# Patient Record
Sex: FEMALE | Race: OTHER | NOT HISPANIC OR LATINO | URBAN - METROPOLITAN AREA
[De-identification: names, ages, dates, MRNs, and addresses within clinical notes are randomized per-mention and may not be internally consistent; named-entity substitution may affect disease eponyms.]

---

## 2022-07-02 PROBLEM — Z00.00 ENCOUNTER FOR PREVENTIVE HEALTH EXAMINATION: Status: ACTIVE | Noted: 2022-07-02

## 2022-07-03 ENCOUNTER — OUTPATIENT (OUTPATIENT)
Dept: OUTPATIENT SERVICES | Facility: HOSPITAL | Age: 82
LOS: 1 days | End: 2022-07-03
Payer: MEDICARE

## 2022-07-03 ENCOUNTER — APPOINTMENT (OUTPATIENT)
Dept: CT IMAGING | Facility: IMAGING CENTER | Age: 82
End: 2022-07-03

## 2022-07-03 DIAGNOSIS — Z00.8 ENCOUNTER FOR OTHER GENERAL EXAMINATION: ICD-10-CM

## 2022-07-03 PROCEDURE — 71275 CT ANGIOGRAPHY CHEST: CPT | Mod: MH

## 2022-07-03 PROCEDURE — 71275 CT ANGIOGRAPHY CHEST: CPT | Mod: 26,MH

## 2024-01-01 ENCOUNTER — TRANSCRIPTION ENCOUNTER (OUTPATIENT)
Age: 84
End: 2024-01-01

## 2024-01-01 ENCOUNTER — RESULT REVIEW (OUTPATIENT)
Age: 84
End: 2024-01-01

## 2024-01-01 ENCOUNTER — INPATIENT (INPATIENT)
Facility: HOSPITAL | Age: 84
LOS: 6 days | DRG: 602 | End: 2024-03-01
Attending: INTERNAL MEDICINE | Admitting: STUDENT IN AN ORGANIZED HEALTH CARE EDUCATION/TRAINING PROGRAM
Payer: MEDICARE

## 2024-01-01 VITALS
RESPIRATION RATE: 22 BRPM | HEART RATE: 80 BPM | OXYGEN SATURATION: 92 % | TEMPERATURE: 97 F | DIASTOLIC BLOOD PRESSURE: 71 MMHG | SYSTOLIC BLOOD PRESSURE: 123 MMHG

## 2024-01-01 VITALS — TEMPERATURE: 98 F

## 2024-01-01 DIAGNOSIS — I50.9 HEART FAILURE, UNSPECIFIED: ICD-10-CM

## 2024-01-01 DIAGNOSIS — I48.0 PAROXYSMAL ATRIAL FIBRILLATION: ICD-10-CM

## 2024-01-01 DIAGNOSIS — Z95.1 PRESENCE OF AORTOCORONARY BYPASS GRAFT: Chronic | ICD-10-CM

## 2024-01-01 DIAGNOSIS — D72.829 ELEVATED WHITE BLOOD CELL COUNT, UNSPECIFIED: ICD-10-CM

## 2024-01-01 DIAGNOSIS — I27.20 PULMONARY HYPERTENSION, UNSPECIFIED: ICD-10-CM

## 2024-01-01 DIAGNOSIS — R57.9 SHOCK, UNSPECIFIED: ICD-10-CM

## 2024-01-01 DIAGNOSIS — N17.9 ACUTE KIDNEY FAILURE, UNSPECIFIED: ICD-10-CM

## 2024-01-01 LAB
A1C WITH ESTIMATED AVERAGE GLUCOSE RESULT: 6 % — HIGH (ref 4–5.6)
ALBUMIN SERPL ELPH-MCNC: 3.1 G/DL — LOW (ref 3.3–5.2)
ALBUMIN SERPL ELPH-MCNC: 3.1 G/DL — LOW (ref 3.3–5.2)
ALBUMIN SERPL ELPH-MCNC: 3.3 G/DL — SIGNIFICANT CHANGE UP (ref 3.3–5.2)
ALBUMIN SERPL ELPH-MCNC: 3.3 G/DL — SIGNIFICANT CHANGE UP (ref 3.3–5.2)
ALBUMIN SERPL ELPH-MCNC: 3.4 G/DL — SIGNIFICANT CHANGE UP (ref 3.3–5.2)
ALBUMIN SERPL ELPH-MCNC: 3.4 G/DL — SIGNIFICANT CHANGE UP (ref 3.3–5.2)
ALBUMIN SERPL ELPH-MCNC: 4 G/DL — SIGNIFICANT CHANGE UP (ref 3.3–5.2)
ALP SERPL-CCNC: 103 U/L — SIGNIFICANT CHANGE UP (ref 40–120)
ALP SERPL-CCNC: 107 U/L — SIGNIFICANT CHANGE UP (ref 40–120)
ALP SERPL-CCNC: 77 U/L — SIGNIFICANT CHANGE UP (ref 40–120)
ALP SERPL-CCNC: 83 U/L — SIGNIFICANT CHANGE UP (ref 40–120)
ALP SERPL-CCNC: 84 U/L — SIGNIFICANT CHANGE UP (ref 40–120)
ALP SERPL-CCNC: 88 U/L — SIGNIFICANT CHANGE UP (ref 40–120)
ALP SERPL-CCNC: 94 U/L — SIGNIFICANT CHANGE UP (ref 40–120)
ALT FLD-CCNC: 13 U/L — SIGNIFICANT CHANGE UP
ALT FLD-CCNC: 360 U/L — HIGH
ALT FLD-CCNC: 490 U/L — HIGH
ALT FLD-CCNC: 564 U/L — HIGH
ALT FLD-CCNC: 635 U/L — HIGH
ALT FLD-CCNC: 7 U/L — SIGNIFICANT CHANGE UP
ALT FLD-CCNC: 72 U/L — HIGH
ANION GAP SERPL CALC-SCNC: 11 MMOL/L — SIGNIFICANT CHANGE UP (ref 5–17)
ANION GAP SERPL CALC-SCNC: 13 MMOL/L — SIGNIFICANT CHANGE UP (ref 5–17)
ANION GAP SERPL CALC-SCNC: 13 MMOL/L — SIGNIFICANT CHANGE UP (ref 5–17)
ANION GAP SERPL CALC-SCNC: 14 MMOL/L — SIGNIFICANT CHANGE UP (ref 5–17)
ANION GAP SERPL CALC-SCNC: 14 MMOL/L — SIGNIFICANT CHANGE UP (ref 5–17)
ANION GAP SERPL CALC-SCNC: 16 MMOL/L — SIGNIFICANT CHANGE UP (ref 5–17)
ANION GAP SERPL CALC-SCNC: 18 MMOL/L — HIGH (ref 5–17)
ANION GAP SERPL CALC-SCNC: 20 MMOL/L — HIGH (ref 5–17)
ANION GAP SERPL CALC-SCNC: 20 MMOL/L — HIGH (ref 5–17)
ANION GAP SERPL CALC-SCNC: 22 MMOL/L — HIGH (ref 5–17)
ANION GAP SERPL CALC-SCNC: 22 MMOL/L — HIGH (ref 5–17)
ANION GAP SERPL CALC-SCNC: 23 MMOL/L — HIGH (ref 5–17)
ANION GAP SERPL CALC-SCNC: 24 MMOL/L — HIGH (ref 5–17)
ANISOCYTOSIS BLD QL: SLIGHT — SIGNIFICANT CHANGE UP
APPEARANCE UR: ABNORMAL
APPEARANCE UR: CLEAR — SIGNIFICANT CHANGE UP
APTT BLD: 117.4 SEC — HIGH (ref 24.5–35.6)
APTT BLD: 134.5 SEC — CRITICAL HIGH (ref 24.5–35.6)
APTT BLD: 190.5 SEC — CRITICAL HIGH (ref 24.5–35.6)
APTT BLD: 30.2 SEC — SIGNIFICANT CHANGE UP (ref 24.5–35.6)
APTT BLD: 31.6 SEC — SIGNIFICANT CHANGE UP (ref 24.5–35.6)
APTT BLD: 39.2 SEC — HIGH (ref 24.5–35.6)
APTT BLD: 78.5 SEC — HIGH (ref 24.5–35.6)
APTT BLD: 93 SEC — HIGH (ref 24.5–35.6)
AST SERPL-CCNC: 1046 U/L — HIGH
AST SERPL-CCNC: 12 U/L — SIGNIFICANT CHANGE UP
AST SERPL-CCNC: 140 U/L — HIGH
AST SERPL-CCNC: 1522 U/L — HIGH
AST SERPL-CCNC: 17 U/L — SIGNIFICANT CHANGE UP
AST SERPL-CCNC: 590 U/L — HIGH
AST SERPL-CCNC: 958 U/L — HIGH
BACTERIA # UR AUTO: ABNORMAL /HPF
BASE EXCESS BLDV CALC-SCNC: -1.5 MMOL/L — SIGNIFICANT CHANGE UP (ref -2–3)
BASOPHILS # BLD AUTO: 0 K/UL — SIGNIFICANT CHANGE UP (ref 0–0.2)
BASOPHILS # BLD AUTO: 0.03 K/UL — SIGNIFICANT CHANGE UP (ref 0–0.2)
BASOPHILS # BLD AUTO: 0.04 K/UL — SIGNIFICANT CHANGE UP (ref 0–0.2)
BASOPHILS # BLD AUTO: 0.05 K/UL — SIGNIFICANT CHANGE UP (ref 0–0.2)
BASOPHILS # BLD AUTO: 0.12 K/UL — SIGNIFICANT CHANGE UP (ref 0–0.2)
BASOPHILS NFR BLD AUTO: 0 % — SIGNIFICANT CHANGE UP (ref 0–2)
BASOPHILS NFR BLD AUTO: 0.1 % — SIGNIFICANT CHANGE UP (ref 0–2)
BASOPHILS NFR BLD AUTO: 0.2 % — SIGNIFICANT CHANGE UP (ref 0–2)
BASOPHILS NFR BLD AUTO: 0.4 % — SIGNIFICANT CHANGE UP (ref 0–2)
BASOPHILS NFR BLD AUTO: 0.4 % — SIGNIFICANT CHANGE UP (ref 0–2)
BASOPHILS NFR BLD AUTO: 0.5 % — SIGNIFICANT CHANGE UP (ref 0–2)
BASOPHILS NFR BLD AUTO: 0.6 % — SIGNIFICANT CHANGE UP (ref 0–2)
BILIRUB SERPL-MCNC: 0.4 MG/DL — SIGNIFICANT CHANGE UP (ref 0.4–2)
BILIRUB SERPL-MCNC: 0.4 MG/DL — SIGNIFICANT CHANGE UP (ref 0.4–2)
BILIRUB SERPL-MCNC: 0.5 MG/DL — SIGNIFICANT CHANGE UP (ref 0.4–2)
BILIRUB SERPL-MCNC: 0.9 MG/DL — SIGNIFICANT CHANGE UP (ref 0.4–2)
BILIRUB SERPL-MCNC: 1.1 MG/DL — SIGNIFICANT CHANGE UP (ref 0.4–2)
BILIRUB UR-MCNC: ABNORMAL
BILIRUB UR-MCNC: NEGATIVE — SIGNIFICANT CHANGE UP
BUN SERPL-MCNC: 32.9 MG/DL — HIGH (ref 8–20)
BUN SERPL-MCNC: 34.7 MG/DL — HIGH (ref 8–20)
BUN SERPL-MCNC: 40.7 MG/DL — HIGH (ref 8–20)
BUN SERPL-MCNC: 40.9 MG/DL — HIGH (ref 8–20)
BUN SERPL-MCNC: 41.3 MG/DL — HIGH (ref 8–20)
BUN SERPL-MCNC: 44.2 MG/DL — HIGH (ref 8–20)
BUN SERPL-MCNC: 44.6 MG/DL — HIGH (ref 8–20)
BUN SERPL-MCNC: 52.8 MG/DL — HIGH (ref 8–20)
BUN SERPL-MCNC: 59.1 MG/DL — HIGH (ref 8–20)
BUN SERPL-MCNC: 65 MG/DL — HIGH (ref 8–20)
BUN SERPL-MCNC: 68.1 MG/DL — HIGH (ref 8–20)
BUN SERPL-MCNC: 73.4 MG/DL — HIGH (ref 8–20)
BUN SERPL-MCNC: 80.7 MG/DL — HIGH (ref 8–20)
BURR CELLS BLD QL SMEAR: PRESENT — SIGNIFICANT CHANGE UP
CA-I SERPL-SCNC: 1.18 MMOL/L — SIGNIFICANT CHANGE UP (ref 1.15–1.33)
CALCIUM SERPL-MCNC: 7.2 MG/DL — LOW (ref 8.4–10.5)
CALCIUM SERPL-MCNC: 7.3 MG/DL — LOW (ref 8.4–10.5)
CALCIUM SERPL-MCNC: 7.4 MG/DL — LOW (ref 8.4–10.5)
CALCIUM SERPL-MCNC: 7.4 MG/DL — LOW (ref 8.4–10.5)
CALCIUM SERPL-MCNC: 7.7 MG/DL — LOW (ref 8.4–10.5)
CALCIUM SERPL-MCNC: 7.7 MG/DL — LOW (ref 8.4–10.5)
CALCIUM SERPL-MCNC: 8 MG/DL — LOW (ref 8.4–10.5)
CALCIUM SERPL-MCNC: 8 MG/DL — LOW (ref 8.4–10.5)
CALCIUM SERPL-MCNC: 8.3 MG/DL — LOW (ref 8.4–10.5)
CALCIUM SERPL-MCNC: 8.5 MG/DL — SIGNIFICANT CHANGE UP (ref 8.4–10.5)
CALCIUM SERPL-MCNC: 8.8 MG/DL — SIGNIFICANT CHANGE UP (ref 8.4–10.5)
CAST: 0 /LPF — SIGNIFICANT CHANGE UP (ref 0–4)
CHLORIDE BLDV-SCNC: 106 MMOL/L — SIGNIFICANT CHANGE UP (ref 96–108)
CHLORIDE SERPL-SCNC: 102 MMOL/L — SIGNIFICANT CHANGE UP (ref 96–108)
CHLORIDE SERPL-SCNC: 102 MMOL/L — SIGNIFICANT CHANGE UP (ref 96–108)
CHLORIDE SERPL-SCNC: 104 MMOL/L — SIGNIFICANT CHANGE UP (ref 96–108)
CHLORIDE SERPL-SCNC: 104 MMOL/L — SIGNIFICANT CHANGE UP (ref 96–108)
CHLORIDE SERPL-SCNC: 105 MMOL/L — SIGNIFICANT CHANGE UP (ref 96–108)
CHLORIDE SERPL-SCNC: 106 MMOL/L — SIGNIFICANT CHANGE UP (ref 96–108)
CHLORIDE SERPL-SCNC: 94 MMOL/L — LOW (ref 96–108)
CHLORIDE SERPL-SCNC: 97 MMOL/L — SIGNIFICANT CHANGE UP (ref 96–108)
CHLORIDE SERPL-SCNC: 97 MMOL/L — SIGNIFICANT CHANGE UP (ref 96–108)
CHLORIDE SERPL-SCNC: 98 MMOL/L — SIGNIFICANT CHANGE UP (ref 96–108)
CHLORIDE SERPL-SCNC: 99 MMOL/L — SIGNIFICANT CHANGE UP (ref 96–108)
CO2 SERPL-SCNC: 19 MMOL/L — LOW (ref 22–29)
CO2 SERPL-SCNC: 21 MMOL/L — LOW (ref 22–29)
CO2 SERPL-SCNC: 21 MMOL/L — LOW (ref 22–29)
CO2 SERPL-SCNC: 22 MMOL/L — SIGNIFICANT CHANGE UP (ref 22–29)
CO2 SERPL-SCNC: 23 MMOL/L — SIGNIFICANT CHANGE UP (ref 22–29)
CO2 SERPL-SCNC: 24 MMOL/L — SIGNIFICANT CHANGE UP (ref 22–29)
CO2 SERPL-SCNC: 26 MMOL/L — SIGNIFICANT CHANGE UP (ref 22–29)
COLOR SPEC: SIGNIFICANT CHANGE UP
COLOR SPEC: YELLOW — SIGNIFICANT CHANGE UP
CREAT SERPL-MCNC: 1.4 MG/DL — HIGH (ref 0.5–1.3)
CREAT SERPL-MCNC: 1.53 MG/DL — HIGH (ref 0.5–1.3)
CREAT SERPL-MCNC: 1.7 MG/DL — HIGH (ref 0.5–1.3)
CREAT SERPL-MCNC: 1.72 MG/DL — HIGH (ref 0.5–1.3)
CREAT SERPL-MCNC: 1.76 MG/DL — HIGH (ref 0.5–1.3)
CREAT SERPL-MCNC: 1.94 MG/DL — HIGH (ref 0.5–1.3)
CREAT SERPL-MCNC: 2.02 MG/DL — HIGH (ref 0.5–1.3)
CREAT SERPL-MCNC: 2.37 MG/DL — HIGH (ref 0.5–1.3)
CREAT SERPL-MCNC: 2.78 MG/DL — HIGH (ref 0.5–1.3)
CREAT SERPL-MCNC: 2.97 MG/DL — HIGH (ref 0.5–1.3)
CREAT SERPL-MCNC: 3.06 MG/DL — HIGH (ref 0.5–1.3)
CREAT SERPL-MCNC: 3.27 MG/DL — HIGH (ref 0.5–1.3)
CREAT SERPL-MCNC: 3.52 MG/DL — HIGH (ref 0.5–1.3)
CULTURE RESULTS: NO GROWTH — SIGNIFICANT CHANGE UP
CULTURE RESULTS: SIGNIFICANT CHANGE UP
CULTURE RESULTS: SIGNIFICANT CHANGE UP
DIFF PNL FLD: ABNORMAL
DIFF PNL FLD: NEGATIVE — SIGNIFICANT CHANGE UP
EGFR: 12 ML/MIN/1.73M2 — LOW
EGFR: 13 ML/MIN/1.73M2 — LOW
EGFR: 15 ML/MIN/1.73M2 — LOW
EGFR: 15 ML/MIN/1.73M2 — LOW
EGFR: 16 ML/MIN/1.73M2 — LOW
EGFR: 20 ML/MIN/1.73M2 — LOW
EGFR: 24 ML/MIN/1.73M2 — LOW
EGFR: 25 ML/MIN/1.73M2 — LOW
EGFR: 28 ML/MIN/1.73M2 — LOW
EGFR: 29 ML/MIN/1.73M2 — LOW
EGFR: 29 ML/MIN/1.73M2 — LOW
EGFR: 33 ML/MIN/1.73M2 — LOW
EGFR: 37 ML/MIN/1.73M2 — LOW
EOSINOPHIL # BLD AUTO: 0 K/UL — SIGNIFICANT CHANGE UP (ref 0–0.5)
EOSINOPHIL # BLD AUTO: 0.11 K/UL — SIGNIFICANT CHANGE UP (ref 0–0.5)
EOSINOPHIL # BLD AUTO: 0.11 K/UL — SIGNIFICANT CHANGE UP (ref 0–0.5)
EOSINOPHIL # BLD AUTO: 0.13 K/UL — SIGNIFICANT CHANGE UP (ref 0–0.5)
EOSINOPHIL # BLD AUTO: 0.13 K/UL — SIGNIFICANT CHANGE UP (ref 0–0.5)
EOSINOPHIL # BLD AUTO: 0.17 K/UL — SIGNIFICANT CHANGE UP (ref 0–0.5)
EOSINOPHIL # BLD AUTO: 0.22 K/UL — SIGNIFICANT CHANGE UP (ref 0–0.5)
EOSINOPHIL NFR BLD AUTO: 0 % — SIGNIFICANT CHANGE UP (ref 0–6)
EOSINOPHIL NFR BLD AUTO: 1.1 % — SIGNIFICANT CHANGE UP (ref 0–6)
EOSINOPHIL NFR BLD AUTO: 1.3 % — SIGNIFICANT CHANGE UP (ref 0–6)
EOSINOPHIL NFR BLD AUTO: 1.6 % — SIGNIFICANT CHANGE UP (ref 0–6)
EOSINOPHIL NFR BLD AUTO: 1.8 % — SIGNIFICANT CHANGE UP (ref 0–6)
EOSINOPHIL NFR BLD AUTO: 1.9 % — SIGNIFICANT CHANGE UP (ref 0–6)
EOSINOPHIL NFR BLD AUTO: 2.8 % — SIGNIFICANT CHANGE UP (ref 0–6)
ESTIMATED AVERAGE GLUCOSE: 126 MG/DL — HIGH (ref 68–114)
FLUAV AG NPH QL: SIGNIFICANT CHANGE UP
FLUBV AG NPH QL: SIGNIFICANT CHANGE UP
GAS PNL BLDA: SIGNIFICANT CHANGE UP
GAS PNL BLDV: 138 MMOL/L — SIGNIFICANT CHANGE UP (ref 136–145)
GAS PNL BLDV: SIGNIFICANT CHANGE UP
GAS PNL BLDV: SIGNIFICANT CHANGE UP
GIANT PLATELETS BLD QL SMEAR: PRESENT — SIGNIFICANT CHANGE UP
GLUCOSE BLDC GLUCOMTR-MCNC: 100 MG/DL — HIGH (ref 70–99)
GLUCOSE BLDC GLUCOMTR-MCNC: 101 MG/DL — HIGH (ref 70–99)
GLUCOSE BLDC GLUCOMTR-MCNC: 102 MG/DL — HIGH (ref 70–99)
GLUCOSE BLDC GLUCOMTR-MCNC: 102 MG/DL — HIGH (ref 70–99)
GLUCOSE BLDC GLUCOMTR-MCNC: 109 MG/DL — HIGH (ref 70–99)
GLUCOSE BLDC GLUCOMTR-MCNC: 110 MG/DL — HIGH (ref 70–99)
GLUCOSE BLDC GLUCOMTR-MCNC: 110 MG/DL — HIGH (ref 70–99)
GLUCOSE BLDC GLUCOMTR-MCNC: 112 MG/DL — HIGH (ref 70–99)
GLUCOSE BLDC GLUCOMTR-MCNC: 114 MG/DL — HIGH (ref 70–99)
GLUCOSE BLDC GLUCOMTR-MCNC: 114 MG/DL — HIGH (ref 70–99)
GLUCOSE BLDC GLUCOMTR-MCNC: 115 MG/DL — HIGH (ref 70–99)
GLUCOSE BLDC GLUCOMTR-MCNC: 116 MG/DL — HIGH (ref 70–99)
GLUCOSE BLDC GLUCOMTR-MCNC: 118 MG/DL — HIGH (ref 70–99)
GLUCOSE BLDC GLUCOMTR-MCNC: 119 MG/DL — HIGH (ref 70–99)
GLUCOSE BLDC GLUCOMTR-MCNC: 120 MG/DL — HIGH (ref 70–99)
GLUCOSE BLDC GLUCOMTR-MCNC: 121 MG/DL — HIGH (ref 70–99)
GLUCOSE BLDC GLUCOMTR-MCNC: 122 MG/DL — HIGH (ref 70–99)
GLUCOSE BLDC GLUCOMTR-MCNC: 123 MG/DL — HIGH (ref 70–99)
GLUCOSE BLDC GLUCOMTR-MCNC: 125 MG/DL — HIGH (ref 70–99)
GLUCOSE BLDC GLUCOMTR-MCNC: 152 MG/DL — HIGH (ref 70–99)
GLUCOSE BLDC GLUCOMTR-MCNC: 157 MG/DL — HIGH (ref 70–99)
GLUCOSE BLDC GLUCOMTR-MCNC: 159 MG/DL — HIGH (ref 70–99)
GLUCOSE BLDC GLUCOMTR-MCNC: 164 MG/DL — HIGH (ref 70–99)
GLUCOSE BLDC GLUCOMTR-MCNC: 166 MG/DL — HIGH (ref 70–99)
GLUCOSE BLDC GLUCOMTR-MCNC: 178 MG/DL — HIGH (ref 70–99)
GLUCOSE BLDC GLUCOMTR-MCNC: 179 MG/DL — HIGH (ref 70–99)
GLUCOSE BLDC GLUCOMTR-MCNC: 198 MG/DL — HIGH (ref 70–99)
GLUCOSE BLDC GLUCOMTR-MCNC: 239 MG/DL — HIGH (ref 70–99)
GLUCOSE BLDC GLUCOMTR-MCNC: 85 MG/DL — SIGNIFICANT CHANGE UP (ref 70–99)
GLUCOSE BLDC GLUCOMTR-MCNC: 87 MG/DL — SIGNIFICANT CHANGE UP (ref 70–99)
GLUCOSE BLDC GLUCOMTR-MCNC: 87 MG/DL — SIGNIFICANT CHANGE UP (ref 70–99)
GLUCOSE BLDC GLUCOMTR-MCNC: 93 MG/DL — SIGNIFICANT CHANGE UP (ref 70–99)
GLUCOSE BLDC GLUCOMTR-MCNC: 96 MG/DL — SIGNIFICANT CHANGE UP (ref 70–99)
GLUCOSE BLDC GLUCOMTR-MCNC: 96 MG/DL — SIGNIFICANT CHANGE UP (ref 70–99)
GLUCOSE BLDC GLUCOMTR-MCNC: 98 MG/DL — SIGNIFICANT CHANGE UP (ref 70–99)
GLUCOSE BLDV-MCNC: 96 MG/DL — SIGNIFICANT CHANGE UP (ref 70–99)
GLUCOSE SERPL-MCNC: 118 MG/DL — HIGH (ref 70–99)
GLUCOSE SERPL-MCNC: 125 MG/DL — HIGH (ref 70–99)
GLUCOSE SERPL-MCNC: 168 MG/DL — HIGH (ref 70–99)
GLUCOSE SERPL-MCNC: 180 MG/DL — HIGH (ref 70–99)
GLUCOSE SERPL-MCNC: 197 MG/DL — HIGH (ref 70–99)
GLUCOSE SERPL-MCNC: 214 MG/DL — HIGH (ref 70–99)
GLUCOSE SERPL-MCNC: 255 MG/DL — HIGH (ref 70–99)
GLUCOSE SERPL-MCNC: 90 MG/DL — SIGNIFICANT CHANGE UP (ref 70–99)
GLUCOSE SERPL-MCNC: 94 MG/DL — SIGNIFICANT CHANGE UP (ref 70–99)
GLUCOSE SERPL-MCNC: 94 MG/DL — SIGNIFICANT CHANGE UP (ref 70–99)
GLUCOSE SERPL-MCNC: 95 MG/DL — SIGNIFICANT CHANGE UP (ref 70–99)
GLUCOSE SERPL-MCNC: 97 MG/DL — SIGNIFICANT CHANGE UP (ref 70–99)
GLUCOSE SERPL-MCNC: 99 MG/DL — SIGNIFICANT CHANGE UP (ref 70–99)
GLUCOSE UR QL: 100 MG/DL
GLUCOSE UR QL: 500 MG/DL
GRAM STN FLD: SIGNIFICANT CHANGE UP
HCO3 BLDV-SCNC: 26 MMOL/L — SIGNIFICANT CHANGE UP (ref 22–29)
HCT VFR BLD CALC: 35.9 % — SIGNIFICANT CHANGE UP (ref 34.5–45)
HCT VFR BLD CALC: 37.6 % — SIGNIFICANT CHANGE UP (ref 34.5–45)
HCT VFR BLD CALC: 37.8 % — SIGNIFICANT CHANGE UP (ref 34.5–45)
HCT VFR BLD CALC: 37.8 % — SIGNIFICANT CHANGE UP (ref 34.5–45)
HCT VFR BLD CALC: 38.5 % — SIGNIFICANT CHANGE UP (ref 34.5–45)
HCT VFR BLD CALC: 39.3 % — SIGNIFICANT CHANGE UP (ref 34.5–45)
HCT VFR BLD CALC: 39.4 % — SIGNIFICANT CHANGE UP (ref 34.5–45)
HCT VFR BLD CALC: 43 % — SIGNIFICANT CHANGE UP (ref 34.5–45)
HCT VFR BLD CALC: 43.8 % — SIGNIFICANT CHANGE UP (ref 34.5–45)
HCT VFR BLD CALC: 44.8 % — SIGNIFICANT CHANGE UP (ref 34.5–45)
HCT VFR BLD CALC: 46 % — HIGH (ref 34.5–45)
HCT VFR BLD CALC: 46.6 % — HIGH (ref 34.5–45)
HCT VFR BLDA CALC: 42 % — SIGNIFICANT CHANGE UP
HGB BLD CALC-MCNC: 14 G/DL — SIGNIFICANT CHANGE UP (ref 11.7–16.1)
HGB BLD-MCNC: 11.9 G/DL — SIGNIFICANT CHANGE UP (ref 11.5–15.5)
HGB BLD-MCNC: 11.9 G/DL — SIGNIFICANT CHANGE UP (ref 11.5–15.5)
HGB BLD-MCNC: 12 G/DL — SIGNIFICANT CHANGE UP (ref 11.5–15.5)
HGB BLD-MCNC: 12.5 G/DL — SIGNIFICANT CHANGE UP (ref 11.5–15.5)
HGB BLD-MCNC: 12.5 G/DL — SIGNIFICANT CHANGE UP (ref 11.5–15.5)
HGB BLD-MCNC: 12.7 G/DL — SIGNIFICANT CHANGE UP (ref 11.5–15.5)
HGB BLD-MCNC: 12.7 G/DL — SIGNIFICANT CHANGE UP (ref 11.5–15.5)
HGB BLD-MCNC: 13.5 G/DL — SIGNIFICANT CHANGE UP (ref 11.5–15.5)
HGB BLD-MCNC: 13.6 G/DL — SIGNIFICANT CHANGE UP (ref 11.5–15.5)
HGB BLD-MCNC: 14.1 G/DL — SIGNIFICANT CHANGE UP (ref 11.5–15.5)
HGB BLD-MCNC: 14.6 G/DL — SIGNIFICANT CHANGE UP (ref 11.5–15.5)
HGB BLD-MCNC: 14.7 G/DL — SIGNIFICANT CHANGE UP (ref 11.5–15.5)
IMM GRANULOCYTES NFR BLD AUTO: 0.4 % — SIGNIFICANT CHANGE UP (ref 0–0.9)
IMM GRANULOCYTES NFR BLD AUTO: 0.5 % — SIGNIFICANT CHANGE UP (ref 0–0.9)
IMM GRANULOCYTES NFR BLD AUTO: 0.5 % — SIGNIFICANT CHANGE UP (ref 0–0.9)
IMM GRANULOCYTES NFR BLD AUTO: 1.1 % — HIGH (ref 0–0.9)
IMM GRANULOCYTES NFR BLD AUTO: 1.3 % — HIGH (ref 0–0.9)
IMM GRANULOCYTES NFR BLD AUTO: 3 % — HIGH (ref 0–0.9)
INR BLD: 1.12 RATIO — SIGNIFICANT CHANGE UP (ref 0.85–1.18)
INR BLD: 1.2 RATIO — HIGH (ref 0.85–1.18)
INR BLD: 1.54 RATIO — HIGH (ref 0.85–1.18)
KETONES UR-MCNC: ABNORMAL MG/DL
KETONES UR-MCNC: NEGATIVE MG/DL — SIGNIFICANT CHANGE UP
LACTATE BLDV-MCNC: 1.4 MMOL/L — SIGNIFICANT CHANGE UP (ref 0.5–2)
LACTATE BLDV-MCNC: 1.8 MMOL/L — SIGNIFICANT CHANGE UP (ref 0.5–2)
LACTATE SERPL-SCNC: 11.9 MMOL/L — CRITICAL HIGH (ref 0.5–2)
LACTATE SERPL-SCNC: 4.3 MMOL/L — CRITICAL HIGH (ref 0.5–2)
LEUKOCYTE ESTERASE UR-ACNC: ABNORMAL
LEUKOCYTE ESTERASE UR-ACNC: NEGATIVE — SIGNIFICANT CHANGE UP
LYMPHOCYTES # BLD AUTO: 0 % — LOW (ref 13–44)
LYMPHOCYTES # BLD AUTO: 0 K/UL — LOW (ref 1–3.3)
LYMPHOCYTES # BLD AUTO: 0.37 K/UL — LOW (ref 1–3.3)
LYMPHOCYTES # BLD AUTO: 0.49 K/UL — LOW (ref 1–3.3)
LYMPHOCYTES # BLD AUTO: 0.52 K/UL — LOW (ref 1–3.3)
LYMPHOCYTES # BLD AUTO: 0.53 K/UL — LOW (ref 1–3.3)
LYMPHOCYTES # BLD AUTO: 0.57 K/UL — LOW (ref 1–3.3)
LYMPHOCYTES # BLD AUTO: 0.63 K/UL — LOW (ref 1–3.3)
LYMPHOCYTES # BLD AUTO: 0.64 K/UL — LOW (ref 1–3.3)
LYMPHOCYTES # BLD AUTO: 0.69 K/UL — LOW (ref 1–3.3)
LYMPHOCYTES # BLD AUTO: 0.72 K/UL — LOW (ref 1–3.3)
LYMPHOCYTES # BLD AUTO: 1.6 % — LOW (ref 13–44)
LYMPHOCYTES # BLD AUTO: 1.7 % — LOW (ref 13–44)
LYMPHOCYTES # BLD AUTO: 2.2 % — LOW (ref 13–44)
LYMPHOCYTES # BLD AUTO: 5.9 % — LOW (ref 13–44)
LYMPHOCYTES # BLD AUTO: 6.3 % — LOW (ref 13–44)
LYMPHOCYTES # BLD AUTO: 7.5 % — LOW (ref 13–44)
LYMPHOCYTES # BLD AUTO: 7.7 % — LOW (ref 13–44)
LYMPHOCYTES # BLD AUTO: 8.6 % — LOW (ref 13–44)
LYMPHOCYTES # BLD AUTO: 8.7 % — LOW (ref 13–44)
MACROCYTES BLD QL: SLIGHT — SIGNIFICANT CHANGE UP
MAGNESIUM SERPL-MCNC: 2.2 MG/DL — SIGNIFICANT CHANGE UP (ref 1.6–2.6)
MAGNESIUM SERPL-MCNC: 2.3 MG/DL — SIGNIFICANT CHANGE UP (ref 1.6–2.6)
MAGNESIUM SERPL-MCNC: 2.3 MG/DL — SIGNIFICANT CHANGE UP (ref 1.6–2.6)
MAGNESIUM SERPL-MCNC: 2.4 MG/DL — SIGNIFICANT CHANGE UP (ref 1.6–2.6)
MAGNESIUM SERPL-MCNC: 2.4 MG/DL — SIGNIFICANT CHANGE UP (ref 1.8–2.6)
MAGNESIUM SERPL-MCNC: 2.8 MG/DL — HIGH (ref 1.6–2.6)
MANUAL SMEAR VERIFICATION: SIGNIFICANT CHANGE UP
MCHC RBC-ENTMCNC: 30.7 PG — SIGNIFICANT CHANGE UP (ref 27–34)
MCHC RBC-ENTMCNC: 31.1 GM/DL — LOW (ref 32–36)
MCHC RBC-ENTMCNC: 31.2 PG — SIGNIFICANT CHANGE UP (ref 27–34)
MCHC RBC-ENTMCNC: 31.3 PG — SIGNIFICANT CHANGE UP (ref 27–34)
MCHC RBC-ENTMCNC: 31.4 GM/DL — LOW (ref 32–36)
MCHC RBC-ENTMCNC: 31.4 PG — SIGNIFICANT CHANGE UP (ref 27–34)
MCHC RBC-ENTMCNC: 31.4 PG — SIGNIFICANT CHANGE UP (ref 27–34)
MCHC RBC-ENTMCNC: 31.5 GM/DL — LOW (ref 32–36)
MCHC RBC-ENTMCNC: 31.5 GM/DL — LOW (ref 32–36)
MCHC RBC-ENTMCNC: 31.5 PG — SIGNIFICANT CHANGE UP (ref 27–34)
MCHC RBC-ENTMCNC: 31.6 GM/DL — LOW (ref 32–36)
MCHC RBC-ENTMCNC: 31.7 GM/DL — LOW (ref 32–36)
MCHC RBC-ENTMCNC: 31.7 PG — SIGNIFICANT CHANGE UP (ref 27–34)
MCHC RBC-ENTMCNC: 31.8 GM/DL — LOW (ref 32–36)
MCHC RBC-ENTMCNC: 32 PG — SIGNIFICANT CHANGE UP (ref 27–34)
MCHC RBC-ENTMCNC: 33 GM/DL — SIGNIFICANT CHANGE UP (ref 32–36)
MCHC RBC-ENTMCNC: 33.1 GM/DL — SIGNIFICANT CHANGE UP (ref 32–36)
MCHC RBC-ENTMCNC: 33.6 GM/DL — SIGNIFICANT CHANGE UP (ref 32–36)
MCV RBC AUTO: 100.9 FL — HIGH (ref 80–100)
MCV RBC AUTO: 93.6 FL — SIGNIFICANT CHANGE UP (ref 80–100)
MCV RBC AUTO: 95.5 FL — SIGNIFICANT CHANGE UP (ref 80–100)
MCV RBC AUTO: 96.5 FL — SIGNIFICANT CHANGE UP (ref 80–100)
MCV RBC AUTO: 96.9 FL — SIGNIFICANT CHANGE UP (ref 80–100)
MCV RBC AUTO: 98 FL — SIGNIFICANT CHANGE UP (ref 80–100)
MCV RBC AUTO: 98.3 FL — SIGNIFICANT CHANGE UP (ref 80–100)
MCV RBC AUTO: 99.2 FL — SIGNIFICANT CHANGE UP (ref 80–100)
MCV RBC AUTO: 99.3 FL — SIGNIFICANT CHANGE UP (ref 80–100)
MCV RBC AUTO: 99.8 FL — SIGNIFICANT CHANGE UP (ref 80–100)
METAMYELOCYTES # FLD: 1.7 % — HIGH (ref 0–0)
MONOCYTES # BLD AUTO: 0.66 K/UL — SIGNIFICANT CHANGE UP (ref 0–0.9)
MONOCYTES # BLD AUTO: 0.75 K/UL — SIGNIFICANT CHANGE UP (ref 0–0.9)
MONOCYTES # BLD AUTO: 0.76 K/UL — SIGNIFICANT CHANGE UP (ref 0–0.9)
MONOCYTES # BLD AUTO: 0.79 K/UL — SIGNIFICANT CHANGE UP (ref 0–0.9)
MONOCYTES # BLD AUTO: 0.81 K/UL — SIGNIFICANT CHANGE UP (ref 0–0.9)
MONOCYTES # BLD AUTO: 0.81 K/UL — SIGNIFICANT CHANGE UP (ref 0–0.9)
MONOCYTES # BLD AUTO: 0.85 K/UL — SIGNIFICANT CHANGE UP (ref 0–0.9)
MONOCYTES # BLD AUTO: 0.91 K/UL — HIGH (ref 0–0.9)
MONOCYTES # BLD AUTO: 1.79 K/UL — HIGH (ref 0–0.9)
MONOCYTES # BLD AUTO: 1.92 K/UL — HIGH (ref 0–0.9)
MONOCYTES NFR BLD AUTO: 3.5 % — SIGNIFICANT CHANGE UP (ref 2–14)
MONOCYTES NFR BLD AUTO: 4.1 % — SIGNIFICANT CHANGE UP (ref 2–14)
MONOCYTES NFR BLD AUTO: 5.5 % — SIGNIFICANT CHANGE UP (ref 2–14)
MONOCYTES NFR BLD AUTO: 6.1 % — SIGNIFICANT CHANGE UP (ref 2–14)
MONOCYTES NFR BLD AUTO: 8.2 % — SIGNIFICANT CHANGE UP (ref 2–14)
MONOCYTES NFR BLD AUTO: 9 % — SIGNIFICANT CHANGE UP (ref 2–14)
MONOCYTES NFR BLD AUTO: 9 % — SIGNIFICANT CHANGE UP (ref 2–14)
MONOCYTES NFR BLD AUTO: 9.2 % — SIGNIFICANT CHANGE UP (ref 2–14)
MONOCYTES NFR BLD AUTO: 9.5 % — SIGNIFICANT CHANGE UP (ref 2–14)
MONOCYTES NFR BLD AUTO: 9.6 % — SIGNIFICANT CHANGE UP (ref 2–14)
MRSA PCR RESULT.: SIGNIFICANT CHANGE UP
MYELOCYTES NFR BLD: 0.9 % — HIGH (ref 0–0)
NEUTROPHILS # BLD AUTO: 20.68 K/UL — HIGH (ref 1.8–7.4)
NEUTROPHILS # BLD AUTO: 22.49 K/UL — HIGH (ref 1.8–7.4)
NEUTROPHILS # BLD AUTO: 28.71 K/UL — HIGH (ref 1.8–7.4)
NEUTROPHILS # BLD AUTO: 28.85 K/UL — HIGH (ref 1.8–7.4)
NEUTROPHILS # BLD AUTO: 5.86 K/UL — SIGNIFICANT CHANGE UP (ref 1.8–7.4)
NEUTROPHILS # BLD AUTO: 6.26 K/UL — SIGNIFICANT CHANGE UP (ref 1.8–7.4)
NEUTROPHILS # BLD AUTO: 6.56 K/UL — SIGNIFICANT CHANGE UP (ref 1.8–7.4)
NEUTROPHILS # BLD AUTO: 6.87 K/UL — SIGNIFICANT CHANGE UP (ref 1.8–7.4)
NEUTROPHILS # BLD AUTO: 7.21 K/UL — SIGNIFICANT CHANGE UP (ref 1.8–7.4)
NEUTROPHILS # BLD AUTO: 8.05 K/UL — HIGH (ref 1.8–7.4)
NEUTROPHILS NFR BLD AUTO: 79.5 % — HIGH (ref 43–77)
NEUTROPHILS NFR BLD AUTO: 79.6 % — HIGH (ref 43–77)
NEUTROPHILS NFR BLD AUTO: 80.3 % — HIGH (ref 43–77)
NEUTROPHILS NFR BLD AUTO: 80.6 % — HIGH (ref 43–77)
NEUTROPHILS NFR BLD AUTO: 80.8 % — HIGH (ref 43–77)
NEUTROPHILS NFR BLD AUTO: 83.9 % — HIGH (ref 43–77)
NEUTROPHILS NFR BLD AUTO: 86.1 % — HIGH (ref 43–77)
NEUTROPHILS NFR BLD AUTO: 89.5 % — HIGH (ref 43–77)
NEUTROPHILS NFR BLD AUTO: 92.8 % — HIGH (ref 43–77)
NEUTROPHILS NFR BLD AUTO: 93 % — HIGH (ref 43–77)
NEUTS BAND # BLD: 5.2 % — SIGNIFICANT CHANGE UP (ref 0–8)
NITRITE UR-MCNC: NEGATIVE — SIGNIFICANT CHANGE UP
NITRITE UR-MCNC: NEGATIVE — SIGNIFICANT CHANGE UP
NT-PROBNP SERPL-SCNC: 5754 PG/ML — HIGH (ref 0–300)
NT-PROBNP SERPL-SCNC: HIGH PG/ML (ref 0–300)
NT-PROBNP SERPL-SCNC: HIGH PG/ML (ref 0–300)
OVALOCYTES BLD QL SMEAR: SLIGHT — SIGNIFICANT CHANGE UP
PCO2 BLDV: 57 MMHG — HIGH (ref 39–42)
PH BLDV: 7.26 — LOW (ref 7.32–7.43)
PH UR: 5.5 — SIGNIFICANT CHANGE UP (ref 5–8)
PH UR: 5.5 — SIGNIFICANT CHANGE UP (ref 5–8)
PHOSPHATE SERPL-MCNC: 3.8 MG/DL — SIGNIFICANT CHANGE UP (ref 2.4–4.7)
PHOSPHATE SERPL-MCNC: 6.2 MG/DL — HIGH (ref 2.4–4.7)
PHOSPHATE SERPL-MCNC: 6.3 MG/DL — HIGH (ref 2.4–4.7)
PHOSPHATE SERPL-MCNC: 7.8 MG/DL — HIGH (ref 2.4–4.7)
PHOSPHATE SERPL-MCNC: 8.1 MG/DL — HIGH (ref 2.4–4.7)
PLAT MORPH BLD: ABNORMAL
PLATELET # BLD AUTO: 108 K/UL — LOW (ref 150–400)
PLATELET # BLD AUTO: 120 K/UL — LOW (ref 150–400)
PLATELET # BLD AUTO: 131 K/UL — LOW (ref 150–400)
PLATELET # BLD AUTO: 139 K/UL — LOW (ref 150–400)
PLATELET # BLD AUTO: 141 K/UL — LOW (ref 150–400)
PLATELET # BLD AUTO: 147 K/UL — LOW (ref 150–400)
PLATELET # BLD AUTO: 148 K/UL — LOW (ref 150–400)
PLATELET # BLD AUTO: 149 K/UL — LOW (ref 150–400)
PLATELET # BLD AUTO: 162 K/UL — SIGNIFICANT CHANGE UP (ref 150–400)
PLATELET # BLD AUTO: 184 K/UL — SIGNIFICANT CHANGE UP (ref 150–400)
PLATELET # BLD AUTO: 67 K/UL — LOW (ref 150–400)
PLATELET # BLD AUTO: 74 K/UL — LOW (ref 150–400)
PO2 BLDV: 43 MMHG — SIGNIFICANT CHANGE UP (ref 25–45)
POIKILOCYTOSIS BLD QL AUTO: SLIGHT — SIGNIFICANT CHANGE UP
POLYCHROMASIA BLD QL SMEAR: SLIGHT — SIGNIFICANT CHANGE UP
POTASSIUM BLDV-SCNC: 5 MMOL/L — SIGNIFICANT CHANGE UP (ref 3.5–5.1)
POTASSIUM SERPL-MCNC: 4.2 MMOL/L — SIGNIFICANT CHANGE UP (ref 3.5–5.3)
POTASSIUM SERPL-MCNC: 4.2 MMOL/L — SIGNIFICANT CHANGE UP (ref 3.5–5.3)
POTASSIUM SERPL-MCNC: 4.5 MMOL/L — SIGNIFICANT CHANGE UP (ref 3.5–5.3)
POTASSIUM SERPL-MCNC: 4.5 MMOL/L — SIGNIFICANT CHANGE UP (ref 3.5–5.3)
POTASSIUM SERPL-MCNC: 4.6 MMOL/L — SIGNIFICANT CHANGE UP (ref 3.5–5.3)
POTASSIUM SERPL-MCNC: 4.6 MMOL/L — SIGNIFICANT CHANGE UP (ref 3.5–5.3)
POTASSIUM SERPL-MCNC: 4.8 MMOL/L — SIGNIFICANT CHANGE UP (ref 3.5–5.3)
POTASSIUM SERPL-MCNC: 5 MMOL/L — SIGNIFICANT CHANGE UP (ref 3.5–5.3)
POTASSIUM SERPL-MCNC: 5.1 MMOL/L — SIGNIFICANT CHANGE UP (ref 3.5–5.3)
POTASSIUM SERPL-MCNC: 5.3 MMOL/L — SIGNIFICANT CHANGE UP (ref 3.5–5.3)
POTASSIUM SERPL-MCNC: 5.5 MMOL/L — HIGH (ref 3.5–5.3)
POTASSIUM SERPL-SCNC: 4.2 MMOL/L — SIGNIFICANT CHANGE UP (ref 3.5–5.3)
POTASSIUM SERPL-SCNC: 4.2 MMOL/L — SIGNIFICANT CHANGE UP (ref 3.5–5.3)
POTASSIUM SERPL-SCNC: 4.5 MMOL/L — SIGNIFICANT CHANGE UP (ref 3.5–5.3)
POTASSIUM SERPL-SCNC: 4.5 MMOL/L — SIGNIFICANT CHANGE UP (ref 3.5–5.3)
POTASSIUM SERPL-SCNC: 4.6 MMOL/L — SIGNIFICANT CHANGE UP (ref 3.5–5.3)
POTASSIUM SERPL-SCNC: 4.6 MMOL/L — SIGNIFICANT CHANGE UP (ref 3.5–5.3)
POTASSIUM SERPL-SCNC: 4.8 MMOL/L — SIGNIFICANT CHANGE UP (ref 3.5–5.3)
POTASSIUM SERPL-SCNC: 5 MMOL/L — SIGNIFICANT CHANGE UP (ref 3.5–5.3)
POTASSIUM SERPL-SCNC: 5.1 MMOL/L — SIGNIFICANT CHANGE UP (ref 3.5–5.3)
POTASSIUM SERPL-SCNC: 5.3 MMOL/L — SIGNIFICANT CHANGE UP (ref 3.5–5.3)
POTASSIUM SERPL-SCNC: 5.5 MMOL/L — HIGH (ref 3.5–5.3)
PROCALCITONIN SERPL-MCNC: 49.69 NG/ML — HIGH (ref 0.02–0.1)
PROT SERPL-MCNC: 5.7 G/DL — LOW (ref 6.6–8.7)
PROT SERPL-MCNC: 5.8 G/DL — LOW (ref 6.6–8.7)
PROT SERPL-MCNC: 5.8 G/DL — LOW (ref 6.6–8.7)
PROT SERPL-MCNC: 6.1 G/DL — LOW (ref 6.6–8.7)
PROT SERPL-MCNC: 7.2 G/DL — SIGNIFICANT CHANGE UP (ref 6.6–8.7)
PROT UR-MCNC: 300 MG/DL
PROT UR-MCNC: SIGNIFICANT CHANGE UP MG/DL
PROTHROM AB SERPL-ACNC: 12.4 SEC — SIGNIFICANT CHANGE UP (ref 9.5–13)
PROTHROM AB SERPL-ACNC: 13.3 SEC — HIGH (ref 9.5–13)
PROTHROM AB SERPL-ACNC: 16.9 SEC — HIGH (ref 9.5–13)
RBC # BLD: 3.72 M/UL — LOW (ref 3.8–5.2)
RBC # BLD: 3.81 M/UL — SIGNIFICANT CHANGE UP (ref 3.8–5.2)
RBC # BLD: 3.88 M/UL — SIGNIFICANT CHANGE UP (ref 3.8–5.2)
RBC # BLD: 4.01 M/UL — SIGNIFICANT CHANGE UP (ref 3.8–5.2)
RBC # BLD: 4.01 M/UL — SIGNIFICANT CHANGE UP (ref 3.8–5.2)
RBC # BLD: 4.03 M/UL — SIGNIFICANT CHANGE UP (ref 3.8–5.2)
RBC # BLD: 4.04 M/UL — SIGNIFICANT CHANGE UP (ref 3.8–5.2)
RBC # BLD: 4.33 M/UL — SIGNIFICANT CHANGE UP (ref 3.8–5.2)
RBC # BLD: 4.34 M/UL — SIGNIFICANT CHANGE UP (ref 3.8–5.2)
RBC # BLD: 4.49 M/UL — SIGNIFICANT CHANGE UP (ref 3.8–5.2)
RBC # BLD: 4.61 M/UL — SIGNIFICANT CHANGE UP (ref 3.8–5.2)
RBC # BLD: 4.67 M/UL — SIGNIFICANT CHANGE UP (ref 3.8–5.2)
RBC # FLD: 15 % — HIGH (ref 10.3–14.5)
RBC # FLD: 15.1 % — HIGH (ref 10.3–14.5)
RBC # FLD: 15.2 % — HIGH (ref 10.3–14.5)
RBC # FLD: 15.3 % — HIGH (ref 10.3–14.5)
RBC # FLD: 15.4 % — HIGH (ref 10.3–14.5)
RBC # FLD: 15.5 % — HIGH (ref 10.3–14.5)
RBC # FLD: 15.5 % — HIGH (ref 10.3–14.5)
RBC # FLD: 15.6 % — HIGH (ref 10.3–14.5)
RBC # FLD: 15.7 % — HIGH (ref 10.3–14.5)
RBC BLD AUTO: ABNORMAL
RBC CASTS # UR COMP ASSIST: 199 /HPF — HIGH (ref 0–4)
RSV RNA NPH QL NAA+NON-PROBE: SIGNIFICANT CHANGE UP
S AUREUS DNA NOSE QL NAA+PROBE: SIGNIFICANT CHANGE UP
SAO2 % BLDV: 67.5 % — SIGNIFICANT CHANGE UP
SARS-COV-2 RNA SPEC QL NAA+PROBE: SIGNIFICANT CHANGE UP
SODIUM SERPL-SCNC: 139 MMOL/L — SIGNIFICANT CHANGE UP (ref 135–145)
SODIUM SERPL-SCNC: 139 MMOL/L — SIGNIFICANT CHANGE UP (ref 135–145)
SODIUM SERPL-SCNC: 140 MMOL/L — SIGNIFICANT CHANGE UP (ref 135–145)
SODIUM SERPL-SCNC: 141 MMOL/L — SIGNIFICANT CHANGE UP (ref 135–145)
SODIUM SERPL-SCNC: 142 MMOL/L — SIGNIFICANT CHANGE UP (ref 135–145)
SODIUM SERPL-SCNC: 143 MMOL/L — SIGNIFICANT CHANGE UP (ref 135–145)
SODIUM SERPL-SCNC: 143 MMOL/L — SIGNIFICANT CHANGE UP (ref 135–145)
SP GR SPEC: 1.02 — SIGNIFICANT CHANGE UP (ref 1–1.03)
SP GR SPEC: 1.02 — SIGNIFICANT CHANGE UP (ref 1–1.03)
SPECIMEN SOURCE: SIGNIFICANT CHANGE UP
SQUAMOUS # UR AUTO: 5 /HPF — SIGNIFICANT CHANGE UP (ref 0–5)
TROPONIN T, HIGH SENSITIVITY RESULT: 363 NG/L — HIGH (ref 0–51)
TROPONIN T, HIGH SENSITIVITY RESULT: 399 NG/L — HIGH (ref 0–51)
TROPONIN T, HIGH SENSITIVITY RESULT: 54 NG/L — HIGH (ref 0–51)
TROPONIN T, HIGH SENSITIVITY RESULT: 58 NG/L — HIGH (ref 0–51)
TROPONIN T, HIGH SENSITIVITY RESULT: 590 NG/L — HIGH (ref 0–51)
TROPONIN T, HIGH SENSITIVITY RESULT: 620 NG/L — HIGH (ref 0–51)
UROBILINOGEN FLD QL: 1 MG/DL — SIGNIFICANT CHANGE UP (ref 0.2–1)
UROBILINOGEN FLD QL: 1 MG/DL — SIGNIFICANT CHANGE UP (ref 0.2–1)
WBC # BLD: 11.08 K/UL — HIGH (ref 3.8–10.5)
WBC # BLD: 15.49 K/UL — HIGH (ref 3.8–10.5)
WBC # BLD: 22.27 K/UL — HIGH (ref 3.8–10.5)
WBC # BLD: 24.18 K/UL — HIGH (ref 3.8–10.5)
WBC # BLD: 31.45 K/UL — HIGH (ref 3.8–10.5)
WBC # BLD: 32.27 K/UL — HIGH (ref 3.8–10.5)
WBC # BLD: 7.36 K/UL — SIGNIFICANT CHANGE UP (ref 3.8–10.5)
WBC # BLD: 7.8 K/UL — SIGNIFICANT CHANGE UP (ref 3.8–10.5)
WBC # BLD: 8.25 K/UL — SIGNIFICANT CHANGE UP (ref 3.8–10.5)
WBC # BLD: 8.5 K/UL — SIGNIFICANT CHANGE UP (ref 3.8–10.5)
WBC # BLD: 8.96 K/UL — SIGNIFICANT CHANGE UP (ref 3.8–10.5)
WBC # BLD: 9.61 K/UL — SIGNIFICANT CHANGE UP (ref 3.8–10.5)
WBC # FLD AUTO: 11.08 K/UL — HIGH (ref 3.8–10.5)
WBC # FLD AUTO: 15.49 K/UL — HIGH (ref 3.8–10.5)
WBC # FLD AUTO: 22.27 K/UL — HIGH (ref 3.8–10.5)
WBC # FLD AUTO: 24.18 K/UL — HIGH (ref 3.8–10.5)
WBC # FLD AUTO: 31.45 K/UL — HIGH (ref 3.8–10.5)
WBC # FLD AUTO: 32.27 K/UL — HIGH (ref 3.8–10.5)
WBC # FLD AUTO: 7.36 K/UL — SIGNIFICANT CHANGE UP (ref 3.8–10.5)
WBC # FLD AUTO: 7.8 K/UL — SIGNIFICANT CHANGE UP (ref 3.8–10.5)
WBC # FLD AUTO: 8.25 K/UL — SIGNIFICANT CHANGE UP (ref 3.8–10.5)
WBC # FLD AUTO: 8.5 K/UL — SIGNIFICANT CHANGE UP (ref 3.8–10.5)
WBC # FLD AUTO: 8.96 K/UL — SIGNIFICANT CHANGE UP (ref 3.8–10.5)
WBC # FLD AUTO: 9.61 K/UL — SIGNIFICANT CHANGE UP (ref 3.8–10.5)
WBC UR QL: 14 /HPF — HIGH (ref 0–5)

## 2024-01-01 PROCEDURE — 85610 PROTHROMBIN TIME: CPT

## 2024-01-01 PROCEDURE — 94760 N-INVAS EAR/PLS OXIMETRY 1: CPT

## 2024-01-01 PROCEDURE — 71250 CT THORAX DX C-: CPT | Mod: MC

## 2024-01-01 PROCEDURE — 87637 SARSCOV2&INF A&B&RSV AMP PRB: CPT

## 2024-01-01 PROCEDURE — 70450 CT HEAD/BRAIN W/O DYE: CPT | Mod: 26

## 2024-01-01 PROCEDURE — 84295 ASSAY OF SERUM SODIUM: CPT

## 2024-01-01 PROCEDURE — 87086 URINE CULTURE/COLONY COUNT: CPT

## 2024-01-01 PROCEDURE — C8929: CPT

## 2024-01-01 PROCEDURE — 99223 1ST HOSP IP/OBS HIGH 75: CPT

## 2024-01-01 PROCEDURE — 76770 US EXAM ABDO BACK WALL COMP: CPT

## 2024-01-01 PROCEDURE — 76770 US EXAM ABDO BACK WALL COMP: CPT | Mod: 26

## 2024-01-01 PROCEDURE — 70450 CT HEAD/BRAIN W/O DYE: CPT | Mod: MC

## 2024-01-01 PROCEDURE — 71250 CT THORAX DX C-: CPT | Mod: 26

## 2024-01-01 PROCEDURE — 99233 SBSQ HOSP IP/OBS HIGH 50: CPT

## 2024-01-01 PROCEDURE — 93970 EXTREMITY STUDY: CPT | Mod: 26

## 2024-01-01 PROCEDURE — 80048 BASIC METABOLIC PNL TOTAL CA: CPT

## 2024-01-01 PROCEDURE — 99291 CRITICAL CARE FIRST HOUR: CPT

## 2024-01-01 PROCEDURE — 84132 ASSAY OF SERUM POTASSIUM: CPT

## 2024-01-01 PROCEDURE — 83880 ASSAY OF NATRIURETIC PEPTIDE: CPT

## 2024-01-01 PROCEDURE — 85025 COMPLETE CBC W/AUTO DIFF WBC: CPT

## 2024-01-01 PROCEDURE — 85014 HEMATOCRIT: CPT

## 2024-01-01 PROCEDURE — 99285 EMERGENCY DEPT VISIT HI MDM: CPT | Mod: 25

## 2024-01-01 PROCEDURE — 83605 ASSAY OF LACTIC ACID: CPT

## 2024-01-01 PROCEDURE — 36415 COLL VENOUS BLD VENIPUNCTURE: CPT

## 2024-01-01 PROCEDURE — 93306 TTE W/DOPPLER COMPLETE: CPT | Mod: 26

## 2024-01-01 PROCEDURE — 94002 VENT MGMT INPAT INIT DAY: CPT

## 2024-01-01 PROCEDURE — 93010 ELECTROCARDIOGRAM REPORT: CPT

## 2024-01-01 PROCEDURE — 87070 CULTURE OTHR SPECIMN AEROBIC: CPT

## 2024-01-01 PROCEDURE — 83036 HEMOGLOBIN GLYCOSYLATED A1C: CPT

## 2024-01-01 PROCEDURE — 82947 ASSAY GLUCOSE BLOOD QUANT: CPT

## 2024-01-01 PROCEDURE — 87641 MR-STAPH DNA AMP PROBE: CPT

## 2024-01-01 PROCEDURE — 81001 URINALYSIS AUTO W/SCOPE: CPT

## 2024-01-01 PROCEDURE — 99285 EMERGENCY DEPT VISIT HI MDM: CPT

## 2024-01-01 PROCEDURE — 82435 ASSAY OF BLOOD CHLORIDE: CPT

## 2024-01-01 PROCEDURE — 71045 X-RAY EXAM CHEST 1 VIEW: CPT | Mod: 26

## 2024-01-01 PROCEDURE — 80053 COMPREHEN METABOLIC PANEL: CPT

## 2024-01-01 PROCEDURE — 81003 URINALYSIS AUTO W/O SCOPE: CPT

## 2024-01-01 PROCEDURE — 96374 THER/PROPH/DIAG INJ IV PUSH: CPT

## 2024-01-01 PROCEDURE — 85730 THROMBOPLASTIN TIME PARTIAL: CPT

## 2024-01-01 PROCEDURE — 84145 PROCALCITONIN (PCT): CPT

## 2024-01-01 PROCEDURE — 82803 BLOOD GASES ANY COMBINATION: CPT

## 2024-01-01 PROCEDURE — 93005 ELECTROCARDIOGRAM TRACING: CPT

## 2024-01-01 PROCEDURE — 84484 ASSAY OF TROPONIN QUANT: CPT

## 2024-01-01 PROCEDURE — 92950 HEART/LUNG RESUSCITATION CPR: CPT

## 2024-01-01 PROCEDURE — 95718 EEG PHYS/QHP 2-12 HR W/VEEG: CPT

## 2024-01-01 PROCEDURE — 84100 ASSAY OF PHOSPHORUS: CPT

## 2024-01-01 PROCEDURE — 99222 1ST HOSP IP/OBS MODERATE 55: CPT

## 2024-01-01 PROCEDURE — 96375 TX/PRO/DX INJ NEW DRUG ADDON: CPT

## 2024-01-01 PROCEDURE — 85018 HEMOGLOBIN: CPT

## 2024-01-01 PROCEDURE — 82962 GLUCOSE BLOOD TEST: CPT

## 2024-01-01 PROCEDURE — 83735 ASSAY OF MAGNESIUM: CPT

## 2024-01-01 PROCEDURE — 74176 CT ABD & PELVIS W/O CONTRAST: CPT | Mod: 26

## 2024-01-01 PROCEDURE — 93970 EXTREMITY STUDY: CPT

## 2024-01-01 PROCEDURE — P9047: CPT

## 2024-01-01 PROCEDURE — 87040 BLOOD CULTURE FOR BACTERIA: CPT

## 2024-01-01 PROCEDURE — 51702 INSERT TEMP BLADDER CATH: CPT

## 2024-01-01 PROCEDURE — 85027 COMPLETE CBC AUTOMATED: CPT

## 2024-01-01 PROCEDURE — 87640 STAPH A DNA AMP PROBE: CPT

## 2024-01-01 PROCEDURE — 94640 AIRWAY INHALATION TREATMENT: CPT

## 2024-01-01 PROCEDURE — 95714 VEEG EA 12-26 HR UNMNTR: CPT

## 2024-01-01 PROCEDURE — 95700 EEG CONT REC W/VID EEG TECH: CPT

## 2024-01-01 PROCEDURE — 82330 ASSAY OF CALCIUM: CPT

## 2024-01-01 PROCEDURE — 74176 CT ABD & PELVIS W/O CONTRAST: CPT | Mod: MC

## 2024-01-01 PROCEDURE — 71045 X-RAY EXAM CHEST 1 VIEW: CPT

## 2024-01-01 RX ORDER — SODIUM BICARBONATE 1 MEQ/ML
0.15 SYRINGE (ML) INTRAVENOUS
Qty: 150 | Refills: 0 | Status: DISCONTINUED | OUTPATIENT
Start: 2024-01-01 | End: 2024-01-01

## 2024-01-01 RX ORDER — MEROPENEM 1 G/30ML
500 INJECTION INTRAVENOUS EVERY 12 HOURS
Refills: 0 | Status: DISCONTINUED | OUTPATIENT
Start: 2024-01-01 | End: 2024-01-01

## 2024-01-01 RX ORDER — METOPROLOL TARTRATE 50 MG
50 TABLET ORAL EVERY 6 HOURS
Refills: 0 | Status: DISCONTINUED | OUTPATIENT
Start: 2024-01-01 | End: 2024-01-01

## 2024-01-01 RX ORDER — NOREPINEPHRINE BITARTRATE/D5W 8 MG/250ML
0.05 PLASTIC BAG, INJECTION (ML) INTRAVENOUS
Qty: 8 | Refills: 0 | Status: DISCONTINUED | OUTPATIENT
Start: 2024-01-01 | End: 2024-01-01

## 2024-01-01 RX ORDER — DEXTROSE 50 % IN WATER 50 %
12.5 SYRINGE (ML) INTRAVENOUS ONCE
Refills: 0 | Status: DISCONTINUED | OUTPATIENT
Start: 2024-01-01 | End: 2024-01-01

## 2024-01-01 RX ORDER — HYDROMORPHONE HYDROCHLORIDE 2 MG/ML
0.5 INJECTION INTRAMUSCULAR; INTRAVENOUS; SUBCUTANEOUS ONCE
Refills: 0 | Status: DISCONTINUED | OUTPATIENT
Start: 2024-01-01 | End: 2024-01-01

## 2024-01-01 RX ORDER — FENTANYL CITRATE 50 UG/ML
25 INJECTION INTRAVENOUS EVERY 4 HOURS
Refills: 0 | Status: DISCONTINUED | OUTPATIENT
Start: 2024-01-01 | End: 2024-01-01

## 2024-01-01 RX ORDER — SIMVASTATIN 20 MG/1
40 TABLET, FILM COATED ORAL AT BEDTIME
Refills: 0 | Status: DISCONTINUED | OUTPATIENT
Start: 2024-01-01 | End: 2024-01-01

## 2024-01-01 RX ORDER — HEPARIN SODIUM 5000 [USP'U]/ML
INJECTION INTRAVENOUS; SUBCUTANEOUS
Qty: 25000 | Refills: 0 | Status: DISCONTINUED | OUTPATIENT
Start: 2024-01-01 | End: 2024-01-01

## 2024-01-01 RX ORDER — SPIRONOLACTONE 25 MG/1
25 TABLET, FILM COATED ORAL DAILY
Refills: 0 | Status: DISCONTINUED | OUTPATIENT
Start: 2024-01-01 | End: 2024-01-01

## 2024-01-01 RX ORDER — NOREPINEPHRINE BITARTRATE/D5W 8 MG/250ML
0.05 PLASTIC BAG, INJECTION (ML) INTRAVENOUS
Qty: 16 | Refills: 0 | Status: DISCONTINUED | OUTPATIENT
Start: 2024-01-01 | End: 2024-01-01

## 2024-01-01 RX ORDER — ASPIRIN/CALCIUM CARB/MAGNESIUM 324 MG
81 TABLET ORAL DAILY
Refills: 0 | Status: DISCONTINUED | OUTPATIENT
Start: 2024-01-01 | End: 2024-01-01

## 2024-01-01 RX ORDER — CALCIUM GLUCONATE 100 MG/ML
2 VIAL (ML) INTRAVENOUS ONCE
Refills: 0 | Status: COMPLETED | OUTPATIENT
Start: 2024-01-01 | End: 2024-01-01

## 2024-01-01 RX ORDER — MIDAZOLAM HYDROCHLORIDE 1 MG/ML
2 INJECTION, SOLUTION INTRAMUSCULAR; INTRAVENOUS ONCE
Refills: 0 | Status: DISCONTINUED | OUTPATIENT
Start: 2024-01-01 | End: 2024-01-01

## 2024-01-01 RX ORDER — EMPAGLIFLOZIN 10 MG/1
1 TABLET, FILM COATED ORAL
Refills: 0 | DISCHARGE

## 2024-01-01 RX ORDER — METOPROLOL TARTRATE 50 MG
50 TABLET ORAL DAILY
Refills: 0 | Status: DISCONTINUED | OUTPATIENT
Start: 2024-01-01 | End: 2024-01-01

## 2024-01-01 RX ORDER — DEXTROSE 50 % IN WATER 50 %
25 SYRINGE (ML) INTRAVENOUS ONCE
Refills: 0 | Status: DISCONTINUED | OUTPATIENT
Start: 2024-01-01 | End: 2024-01-01

## 2024-01-01 RX ORDER — LEVALBUTEROL 1.25 MG/.5ML
0.63 SOLUTION, CONCENTRATE RESPIRATORY (INHALATION) EVERY 6 HOURS
Refills: 0 | Status: DISCONTINUED | OUTPATIENT
Start: 2024-01-01 | End: 2024-01-01

## 2024-01-01 RX ORDER — HEPARIN SODIUM 5000 [USP'U]/ML
3000 INJECTION INTRAVENOUS; SUBCUTANEOUS EVERY 6 HOURS
Refills: 0 | Status: DISCONTINUED | OUTPATIENT
Start: 2024-01-01 | End: 2024-01-01

## 2024-01-01 RX ORDER — METOPROLOL TARTRATE 50 MG
1 TABLET ORAL
Refills: 0 | DISCHARGE

## 2024-01-01 RX ORDER — HYDROCORTISONE 20 MG
100 TABLET ORAL EVERY 8 HOURS
Refills: 0 | Status: DISCONTINUED | OUTPATIENT
Start: 2024-01-01 | End: 2024-01-01

## 2024-01-01 RX ORDER — MIDAZOLAM HYDROCHLORIDE 1 MG/ML
2 INJECTION, SOLUTION INTRAMUSCULAR; INTRAVENOUS
Refills: 0 | Status: DISCONTINUED | OUTPATIENT
Start: 2024-01-01 | End: 2024-01-01

## 2024-01-01 RX ORDER — DOBUTAMINE HCL 250MG/20ML
2.5 VIAL (ML) INTRAVENOUS
Qty: 500 | Refills: 0 | Status: DISCONTINUED | OUTPATIENT
Start: 2024-01-01 | End: 2024-01-01

## 2024-01-01 RX ORDER — ACETAMINOPHEN 500 MG
1000 TABLET ORAL ONCE
Refills: 0 | Status: COMPLETED | OUTPATIENT
Start: 2024-01-01 | End: 2024-01-01

## 2024-01-01 RX ORDER — FENTANYL CITRATE 50 UG/ML
50 INJECTION INTRAVENOUS ONCE
Refills: 0 | Status: DISCONTINUED | OUTPATIENT
Start: 2024-01-01 | End: 2024-01-01

## 2024-01-01 RX ORDER — FUROSEMIDE 40 MG
40 TABLET ORAL ONCE
Refills: 0 | Status: COMPLETED | OUTPATIENT
Start: 2024-01-01 | End: 2024-01-01

## 2024-01-01 RX ORDER — CEFTRIAXONE 500 MG/1
1000 INJECTION, POWDER, FOR SOLUTION INTRAMUSCULAR; INTRAVENOUS ONCE
Refills: 0 | Status: DISCONTINUED | OUTPATIENT
Start: 2024-01-01 | End: 2024-01-01

## 2024-01-01 RX ORDER — HYDROMORPHONE HYDROCHLORIDE 2 MG/ML
2 INJECTION INTRAMUSCULAR; INTRAVENOUS; SUBCUTANEOUS ONCE
Refills: 0 | Status: DISCONTINUED | OUTPATIENT
Start: 2024-01-01 | End: 2024-01-01

## 2024-01-01 RX ORDER — SODIUM CHLORIDE 9 MG/ML
1000 INJECTION, SOLUTION INTRAVENOUS
Refills: 0 | Status: DISCONTINUED | OUTPATIENT
Start: 2024-01-01 | End: 2024-01-01

## 2024-01-01 RX ORDER — PROPOFOL 10 MG/ML
5 INJECTION, EMULSION INTRAVENOUS
Qty: 1000 | Refills: 0 | Status: DISCONTINUED | OUTPATIENT
Start: 2024-01-01 | End: 2024-01-01

## 2024-01-01 RX ORDER — HEPARIN SODIUM 5000 [USP'U]/ML
5000 INJECTION INTRAVENOUS; SUBCUTANEOUS EVERY 8 HOURS
Refills: 0 | Status: DISCONTINUED | OUTPATIENT
Start: 2024-01-01 | End: 2024-01-01

## 2024-01-01 RX ORDER — MEROPENEM 1 G/30ML
INJECTION INTRAVENOUS
Refills: 0 | Status: DISCONTINUED | OUTPATIENT
Start: 2024-01-01 | End: 2024-01-01

## 2024-01-01 RX ORDER — HYDROMORPHONE HYDROCHLORIDE 2 MG/ML
0.5 INJECTION INTRAMUSCULAR; INTRAVENOUS; SUBCUTANEOUS
Qty: 100 | Refills: 0 | Status: DISCONTINUED | OUTPATIENT
Start: 2024-01-01 | End: 2024-01-01

## 2024-01-01 RX ORDER — BUMETANIDE 0.25 MG/ML
1 INJECTION INTRAMUSCULAR; INTRAVENOUS EVERY 8 HOURS
Refills: 0 | Status: DISCONTINUED | OUTPATIENT
Start: 2024-01-01 | End: 2024-01-01

## 2024-01-01 RX ORDER — PHENYLEPHRINE HYDROCHLORIDE 10 MG/ML
0.1 INJECTION INTRAVENOUS
Qty: 40 | Refills: 0 | Status: DISCONTINUED | OUTPATIENT
Start: 2024-01-01 | End: 2024-01-01

## 2024-01-01 RX ORDER — ALBUMIN HUMAN 25 %
100 VIAL (ML) INTRAVENOUS ONCE
Refills: 0 | Status: COMPLETED | OUTPATIENT
Start: 2024-01-01 | End: 2024-01-01

## 2024-01-01 RX ORDER — FUROSEMIDE 40 MG
20 TABLET ORAL ONCE
Refills: 0 | Status: COMPLETED | OUTPATIENT
Start: 2024-01-01 | End: 2024-01-01

## 2024-01-01 RX ORDER — HEPARIN SODIUM 5000 [USP'U]/ML
5000 INJECTION INTRAVENOUS; SUBCUTANEOUS EVERY 12 HOURS
Refills: 0 | Status: DISCONTINUED | OUTPATIENT
Start: 2024-01-01 | End: 2024-01-01

## 2024-01-01 RX ORDER — HEPARIN SODIUM 5000 [USP'U]/ML
6000 INJECTION INTRAVENOUS; SUBCUTANEOUS EVERY 6 HOURS
Refills: 0 | Status: DISCONTINUED | OUTPATIENT
Start: 2024-01-01 | End: 2024-01-01

## 2024-01-01 RX ORDER — DEXTROSE 50 % IN WATER 50 %
15 SYRINGE (ML) INTRAVENOUS ONCE
Refills: 0 | Status: DISCONTINUED | OUTPATIENT
Start: 2024-01-01 | End: 2024-01-01

## 2024-01-01 RX ORDER — FUROSEMIDE 40 MG
1 TABLET ORAL
Refills: 0 | DISCHARGE

## 2024-01-01 RX ORDER — VASOPRESSIN 20 [USP'U]/ML
0.04 INJECTION INTRAVENOUS
Qty: 40 | Refills: 0 | Status: DISCONTINUED | OUTPATIENT
Start: 2024-01-01 | End: 2024-01-01

## 2024-01-01 RX ORDER — SIMVASTATIN 20 MG/1
1 TABLET, FILM COATED ORAL
Refills: 0 | DISCHARGE

## 2024-01-01 RX ORDER — ACETAMINOPHEN 500 MG
1000 TABLET ORAL ONCE
Refills: 0 | Status: DISCONTINUED | OUTPATIENT
Start: 2024-01-01 | End: 2024-01-01

## 2024-01-01 RX ORDER — CEFAZOLIN SODIUM 1 G
1000 VIAL (EA) INJECTION EVERY 8 HOURS
Refills: 0 | Status: DISCONTINUED | OUTPATIENT
Start: 2024-01-01 | End: 2024-01-01

## 2024-01-01 RX ORDER — CHLORHEXIDINE GLUCONATE 213 G/1000ML
15 SOLUTION TOPICAL EVERY 12 HOURS
Refills: 0 | Status: DISCONTINUED | OUTPATIENT
Start: 2024-01-01 | End: 2024-01-01

## 2024-01-01 RX ORDER — IPRATROPIUM BROMIDE 0.2 MG/ML
500 SOLUTION, NON-ORAL INHALATION EVERY 6 HOURS
Refills: 0 | Status: DISCONTINUED | OUTPATIENT
Start: 2024-01-01 | End: 2024-01-01

## 2024-01-01 RX ORDER — INSULIN LISPRO 100/ML
VIAL (ML) SUBCUTANEOUS
Refills: 0 | Status: DISCONTINUED | OUTPATIENT
Start: 2024-01-01 | End: 2024-01-01

## 2024-01-01 RX ORDER — INSULIN LISPRO 100/ML
VIAL (ML) SUBCUTANEOUS EVERY 6 HOURS
Refills: 0 | Status: DISCONTINUED | OUTPATIENT
Start: 2024-01-01 | End: 2024-01-01

## 2024-01-01 RX ORDER — ACETAMINOPHEN 500 MG
650 TABLET ORAL EVERY 6 HOURS
Refills: 0 | Status: DISCONTINUED | OUTPATIENT
Start: 2024-01-01 | End: 2024-01-01

## 2024-01-01 RX ORDER — FUROSEMIDE 40 MG
40 TABLET ORAL EVERY 12 HOURS
Refills: 0 | Status: DISCONTINUED | OUTPATIENT
Start: 2024-01-01 | End: 2024-01-01

## 2024-01-01 RX ORDER — AMIODARONE HYDROCHLORIDE 400 MG/1
150 TABLET ORAL ONCE
Refills: 0 | Status: DISCONTINUED | OUTPATIENT
Start: 2024-01-01 | End: 2024-01-01

## 2024-01-01 RX ORDER — DEXTROSE 50 % IN WATER 50 %
25 SYRINGE (ML) INTRAVENOUS ONCE
Refills: 0 | Status: COMPLETED | OUTPATIENT
Start: 2024-01-01 | End: 2024-01-01

## 2024-01-01 RX ORDER — DEXMEDETOMIDINE HYDROCHLORIDE IN 0.9% SODIUM CHLORIDE 4 UG/ML
0.2 INJECTION INTRAVENOUS
Qty: 200 | Refills: 0 | Status: DISCONTINUED | OUTPATIENT
Start: 2024-01-01 | End: 2024-01-01

## 2024-01-01 RX ORDER — INSULIN HUMAN 100 [IU]/ML
5 INJECTION, SOLUTION SUBCUTANEOUS ONCE
Refills: 0 | Status: COMPLETED | OUTPATIENT
Start: 2024-01-01 | End: 2024-01-01

## 2024-01-01 RX ORDER — CHLORHEXIDINE GLUCONATE 213 G/1000ML
1 SOLUTION TOPICAL DAILY
Refills: 0 | Status: DISCONTINUED | OUTPATIENT
Start: 2024-01-01 | End: 2024-01-01

## 2024-01-01 RX ORDER — FUROSEMIDE 40 MG
80 TABLET ORAL EVERY 12 HOURS
Refills: 0 | Status: DISCONTINUED | OUTPATIENT
Start: 2024-01-01 | End: 2024-01-01

## 2024-01-01 RX ORDER — MEROPENEM 1 G/30ML
1000 INJECTION INTRAVENOUS EVERY 8 HOURS
Refills: 0 | Status: DISCONTINUED | OUTPATIENT
Start: 2024-01-01 | End: 2024-01-01

## 2024-01-01 RX ORDER — GLUCAGON INJECTION, SOLUTION 0.5 MG/.1ML
1 INJECTION, SOLUTION SUBCUTANEOUS ONCE
Refills: 0 | Status: DISCONTINUED | OUTPATIENT
Start: 2024-01-01 | End: 2024-01-01

## 2024-01-01 RX ORDER — HEPARIN SODIUM 5000 [USP'U]/ML
6000 INJECTION INTRAVENOUS; SUBCUTANEOUS ONCE
Refills: 0 | Status: COMPLETED | OUTPATIENT
Start: 2024-01-01 | End: 2024-01-01

## 2024-01-01 RX ORDER — LISINOPRIL 2.5 MG/1
1 TABLET ORAL
Refills: 0 | DISCHARGE

## 2024-01-01 RX ORDER — HYDROMORPHONE HYDROCHLORIDE 2 MG/ML
1 INJECTION INTRAMUSCULAR; INTRAVENOUS; SUBCUTANEOUS
Refills: 0 | Status: DISCONTINUED | OUTPATIENT
Start: 2024-01-01 | End: 2024-01-01

## 2024-01-01 RX ORDER — HYDROCORTISONE 20 MG
50 TABLET ORAL EVERY 8 HOURS
Refills: 0 | Status: DISCONTINUED | OUTPATIENT
Start: 2024-01-01 | End: 2024-01-01

## 2024-01-01 RX ORDER — ROBINUL 0.2 MG/ML
0.4 INJECTION INTRAMUSCULAR; INTRAVENOUS EVERY 6 HOURS
Refills: 0 | Status: DISCONTINUED | OUTPATIENT
Start: 2024-01-01 | End: 2024-01-01

## 2024-01-01 RX ORDER — BUMETANIDE 0.25 MG/ML
0.5 INJECTION INTRAMUSCULAR; INTRAVENOUS
Qty: 20 | Refills: 0 | Status: DISCONTINUED | OUTPATIENT
Start: 2024-01-01 | End: 2024-01-01

## 2024-01-01 RX ORDER — VANCOMYCIN HCL 1 G
1000 VIAL (EA) INTRAVENOUS ONCE
Refills: 0 | Status: COMPLETED | OUTPATIENT
Start: 2024-01-01 | End: 2024-01-01

## 2024-01-01 RX ORDER — HYDROMORPHONE HYDROCHLORIDE 2 MG/ML
0.5 INJECTION INTRAMUSCULAR; INTRAVENOUS; SUBCUTANEOUS
Refills: 0 | Status: DISCONTINUED | OUTPATIENT
Start: 2024-01-01 | End: 2024-01-01

## 2024-01-01 RX ORDER — IPRATROPIUM/ALBUTEROL SULFATE 18-103MCG
3 AEROSOL WITH ADAPTER (GRAM) INHALATION EVERY 6 HOURS
Refills: 0 | Status: DISCONTINUED | OUTPATIENT
Start: 2024-01-01 | End: 2024-01-01

## 2024-01-01 RX ORDER — METOPROLOL TARTRATE 50 MG
5 TABLET ORAL ONCE
Refills: 0 | Status: COMPLETED | OUTPATIENT
Start: 2024-01-01 | End: 2024-01-01

## 2024-01-01 RX ORDER — MEROPENEM 1 G/30ML
500 INJECTION INTRAVENOUS ONCE
Refills: 0 | Status: COMPLETED | OUTPATIENT
Start: 2024-01-01 | End: 2024-01-01

## 2024-01-01 RX ORDER — ASPIRIN/CALCIUM CARB/MAGNESIUM 324 MG
1 TABLET ORAL
Refills: 0 | DISCHARGE

## 2024-01-01 RX ORDER — INSULIN LISPRO 100/ML
VIAL (ML) SUBCUTANEOUS AT BEDTIME
Refills: 0 | Status: DISCONTINUED | OUTPATIENT
Start: 2024-01-01 | End: 2024-01-01

## 2024-01-01 RX ORDER — CEFAZOLIN SODIUM 1 G
1000 VIAL (EA) INJECTION EVERY 12 HOURS
Refills: 0 | Status: DISCONTINUED | OUTPATIENT
Start: 2024-01-01 | End: 2024-01-01

## 2024-01-01 RX ORDER — BUMETANIDE 0.25 MG/ML
2 INJECTION INTRAMUSCULAR; INTRAVENOUS ONCE
Refills: 0 | Status: COMPLETED | OUTPATIENT
Start: 2024-01-01 | End: 2024-01-01

## 2024-01-01 RX ORDER — CEFTRIAXONE 500 MG/1
1000 INJECTION, POWDER, FOR SOLUTION INTRAMUSCULAR; INTRAVENOUS ONCE
Refills: 0 | Status: COMPLETED | OUTPATIENT
Start: 2024-01-01 | End: 2024-01-01

## 2024-01-01 RX ORDER — PROPOFOL 10 MG/ML
5 INJECTION, EMULSION INTRAVENOUS
Qty: 500 | Refills: 0 | Status: DISCONTINUED | OUTPATIENT
Start: 2024-01-01 | End: 2024-01-01

## 2024-01-01 RX ORDER — PANTOPRAZOLE SODIUM 20 MG/1
40 TABLET, DELAYED RELEASE ORAL DAILY
Refills: 0 | Status: DISCONTINUED | OUTPATIENT
Start: 2024-01-01 | End: 2024-01-01

## 2024-01-01 RX ORDER — METOPROLOL TARTRATE 50 MG
5 TABLET ORAL EVERY 6 HOURS
Refills: 0 | Status: DISCONTINUED | OUTPATIENT
Start: 2024-01-01 | End: 2024-01-01

## 2024-01-01 RX ADMIN — HEPARIN SODIUM 5000 UNIT(S): 5000 INJECTION INTRAVENOUS; SUBCUTANEOUS at 14:06

## 2024-01-01 RX ADMIN — Medication 1000 MILLIGRAM(S): at 17:26

## 2024-01-01 RX ADMIN — Medication 250 MILLIGRAM(S): at 04:12

## 2024-01-01 RX ADMIN — CHLORHEXIDINE GLUCONATE 15 MILLILITER(S): 213 SOLUTION TOPICAL at 05:32

## 2024-01-01 RX ADMIN — Medication 1000 MILLIGRAM(S): at 05:02

## 2024-01-01 RX ADMIN — Medication 650 MILLIGRAM(S): at 14:07

## 2024-01-01 RX ADMIN — DEXMEDETOMIDINE HYDROCHLORIDE IN 0.9% SODIUM CHLORIDE 3.71 MICROGRAM(S)/KG/HR: 4 INJECTION INTRAVENOUS at 15:45

## 2024-01-01 RX ADMIN — SIMVASTATIN 40 MILLIGRAM(S): 20 TABLET, FILM COATED ORAL at 22:27

## 2024-01-01 RX ADMIN — CEFTRIAXONE 1000 MILLIGRAM(S): 500 INJECTION, POWDER, FOR SOLUTION INTRAMUSCULAR; INTRAVENOUS at 04:11

## 2024-01-01 RX ADMIN — FENTANYL CITRATE 25 MICROGRAM(S): 50 INJECTION INTRAVENOUS at 06:18

## 2024-01-01 RX ADMIN — Medication 40 MILLIGRAM(S): at 06:10

## 2024-01-01 RX ADMIN — BUMETANIDE 2 MILLIGRAM(S): 0.25 INJECTION INTRAMUSCULAR; INTRAVENOUS at 01:19

## 2024-01-01 RX ADMIN — LEVALBUTEROL 0.63 MILLIGRAM(S): 1.25 SOLUTION, CONCENTRATE RESPIRATORY (INHALATION) at 04:11

## 2024-01-01 RX ADMIN — Medication 3 MILLILITER(S): at 08:25

## 2024-01-01 RX ADMIN — Medication 1000 MILLIGRAM(S): at 05:51

## 2024-01-01 RX ADMIN — Medication 1000 MILLIGRAM(S): at 13:30

## 2024-01-01 RX ADMIN — HEPARIN SODIUM 5000 UNIT(S): 5000 INJECTION INTRAVENOUS; SUBCUTANEOUS at 14:38

## 2024-01-01 RX ADMIN — HEPARIN SODIUM 6000 UNIT(S): 5000 INJECTION INTRAVENOUS; SUBCUTANEOUS at 18:50

## 2024-01-01 RX ADMIN — HEPARIN SODIUM 5000 UNIT(S): 5000 INJECTION INTRAVENOUS; SUBCUTANEOUS at 05:40

## 2024-01-01 RX ADMIN — Medication 3 MILLILITER(S): at 14:30

## 2024-01-01 RX ADMIN — Medication 400 MILLIGRAM(S): at 01:42

## 2024-01-01 RX ADMIN — Medication 40 MILLIGRAM(S): at 05:32

## 2024-01-01 RX ADMIN — SIMVASTATIN 40 MILLIGRAM(S): 20 TABLET, FILM COATED ORAL at 21:08

## 2024-01-01 RX ADMIN — Medication 500 MICROGRAM(S): at 21:07

## 2024-01-01 RX ADMIN — Medication 40 MILLIGRAM(S): at 20:16

## 2024-01-01 RX ADMIN — Medication 250 MILLIGRAM(S): at 03:08

## 2024-01-01 RX ADMIN — Medication 2: at 00:22

## 2024-01-01 RX ADMIN — Medication 81 MILLIGRAM(S): at 13:07

## 2024-01-01 RX ADMIN — HEPARIN SODIUM 1300 UNIT(S)/HR: 5000 INJECTION INTRAVENOUS; SUBCUTANEOUS at 00:15

## 2024-01-01 RX ADMIN — Medication 400 MILLIGRAM(S): at 12:31

## 2024-01-01 RX ADMIN — Medication 3 MILLILITER(S): at 14:20

## 2024-01-01 RX ADMIN — FENTANYL CITRATE 50 MICROGRAM(S): 50 INJECTION INTRAVENOUS at 00:32

## 2024-01-01 RX ADMIN — Medication 2: at 12:12

## 2024-01-01 RX ADMIN — DEXMEDETOMIDINE HYDROCHLORIDE IN 0.9% SODIUM CHLORIDE 3.71 MICROGRAM(S)/KG/HR: 4 INJECTION INTRAVENOUS at 12:58

## 2024-01-01 RX ADMIN — Medication 100 MILLIGRAM(S): at 00:42

## 2024-01-01 RX ADMIN — Medication 1000 MILLIGRAM(S): at 17:03

## 2024-01-01 RX ADMIN — Medication 650 MILLIGRAM(S): at 13:07

## 2024-01-01 RX ADMIN — Medication 650 MILLIGRAM(S): at 03:21

## 2024-01-01 RX ADMIN — Medication 400 MILLIGRAM(S): at 01:50

## 2024-01-01 RX ADMIN — MEROPENEM 1000 MILLIGRAM(S): 1 INJECTION INTRAVENOUS at 22:17

## 2024-01-01 RX ADMIN — Medication 650 MILLIGRAM(S): at 03:08

## 2024-01-01 RX ADMIN — Medication 50 MILLIGRAM(S): at 05:39

## 2024-01-01 RX ADMIN — Medication 5 MILLIGRAM(S): at 17:53

## 2024-01-01 RX ADMIN — Medication 50 MILLIGRAM(S): at 14:16

## 2024-01-01 RX ADMIN — Medication 1000 MILLIGRAM(S): at 11:14

## 2024-01-01 RX ADMIN — FENTANYL CITRATE 25 MICROGRAM(S): 50 INJECTION INTRAVENOUS at 11:33

## 2024-01-01 RX ADMIN — LEVALBUTEROL 0.63 MILLIGRAM(S): 1.25 SOLUTION, CONCENTRATE RESPIRATORY (INHALATION) at 04:00

## 2024-01-01 RX ADMIN — Medication 3 MILLILITER(S): at 20:54

## 2024-01-01 RX ADMIN — FENTANYL CITRATE 50 MICROGRAM(S): 50 INJECTION INTRAVENOUS at 02:29

## 2024-01-01 RX ADMIN — DEXMEDETOMIDINE HYDROCHLORIDE IN 0.9% SODIUM CHLORIDE 3.71 MICROGRAM(S)/KG/HR: 4 INJECTION INTRAVENOUS at 18:59

## 2024-01-01 RX ADMIN — Medication 40 MILLIGRAM(S): at 17:03

## 2024-01-01 RX ADMIN — Medication 50 MILLIGRAM(S): at 05:32

## 2024-01-01 RX ADMIN — LEVALBUTEROL 0.63 MILLIGRAM(S): 1.25 SOLUTION, CONCENTRATE RESPIRATORY (INHALATION) at 09:21

## 2024-01-01 RX ADMIN — Medication 81 MILLIGRAM(S): at 13:37

## 2024-01-01 RX ADMIN — Medication 3 MILLILITER(S): at 08:34

## 2024-01-01 RX ADMIN — Medication 3 MILLILITER(S): at 20:46

## 2024-01-01 RX ADMIN — Medication 40 MILLIGRAM(S): at 05:02

## 2024-01-01 RX ADMIN — HYDROMORPHONE HYDROCHLORIDE 0.5 MILLIGRAM(S): 2 INJECTION INTRAMUSCULAR; INTRAVENOUS; SUBCUTANEOUS at 14:25

## 2024-01-01 RX ADMIN — Medication 100 MILLIGRAM(S): at 14:39

## 2024-01-01 RX ADMIN — HEPARIN SODIUM 5000 UNIT(S): 5000 INJECTION INTRAVENOUS; SUBCUTANEOUS at 21:22

## 2024-01-01 RX ADMIN — Medication 20 MILLIGRAM(S): at 05:21

## 2024-01-01 RX ADMIN — LEVALBUTEROL 0.63 MILLIGRAM(S): 1.25 SOLUTION, CONCENTRATE RESPIRATORY (INHALATION) at 08:46

## 2024-01-01 RX ADMIN — HEPARIN SODIUM 1300 UNIT(S)/HR: 5000 INJECTION INTRAVENOUS; SUBCUTANEOUS at 19:28

## 2024-01-01 RX ADMIN — MEROPENEM 500 MILLIGRAM(S): 1 INJECTION INTRAVENOUS at 18:47

## 2024-01-01 RX ADMIN — LEVALBUTEROL 0.63 MILLIGRAM(S): 1.25 SOLUTION, CONCENTRATE RESPIRATORY (INHALATION) at 21:16

## 2024-01-01 RX ADMIN — HEPARIN SODIUM 1300 UNIT(S)/HR: 5000 INJECTION INTRAVENOUS; SUBCUTANEOUS at 01:21

## 2024-01-01 RX ADMIN — Medication 81 MILLIGRAM(S): at 14:16

## 2024-01-01 RX ADMIN — VASOPRESSIN 6 UNIT(S)/MIN: 20 INJECTION INTRAVENOUS at 14:55

## 2024-01-01 RX ADMIN — Medication 2: at 01:24

## 2024-01-01 RX ADMIN — Medication 1 MILLIGRAM(S): at 21:37

## 2024-01-01 RX ADMIN — VASOPRESSIN 6 UNIT(S)/MIN: 20 INJECTION INTRAVENOUS at 22:54

## 2024-01-01 RX ADMIN — HEPARIN SODIUM 5000 UNIT(S): 5000 INJECTION INTRAVENOUS; SUBCUTANEOUS at 05:32

## 2024-01-01 RX ADMIN — Medication 40 MILLIGRAM(S): at 17:26

## 2024-01-01 RX ADMIN — Medication 50 MILLIGRAM(S): at 06:10

## 2024-01-01 RX ADMIN — MEROPENEM 1000 MILLIGRAM(S): 1 INJECTION INTRAVENOUS at 05:32

## 2024-01-01 RX ADMIN — Medication 40 MILLIGRAM(S): at 08:58

## 2024-01-01 RX ADMIN — Medication 500 MICROGRAM(S): at 04:11

## 2024-01-01 RX ADMIN — Medication 80 MILLIGRAM(S): at 05:39

## 2024-01-01 RX ADMIN — MEROPENEM 500 MILLIGRAM(S): 1 INJECTION INTRAVENOUS at 00:13

## 2024-01-01 RX ADMIN — LEVALBUTEROL 0.63 MILLIGRAM(S): 1.25 SOLUTION, CONCENTRATE RESPIRATORY (INHALATION) at 14:27

## 2024-01-01 RX ADMIN — Medication 5.56 MICROGRAM(S)/KG/MIN: at 18:17

## 2024-01-01 RX ADMIN — Medication 3.47 MICROGRAM(S)/KG/MIN: at 22:17

## 2024-01-01 RX ADMIN — HYDROMORPHONE HYDROCHLORIDE 1 MILLIGRAM(S): 2 INJECTION INTRAMUSCULAR; INTRAVENOUS; SUBCUTANEOUS at 21:16

## 2024-01-01 RX ADMIN — Medication 81 MILLIGRAM(S): at 12:43

## 2024-01-01 RX ADMIN — Medication 50 MILLIGRAM(S): at 17:03

## 2024-01-01 RX ADMIN — HEPARIN SODIUM 1100 UNIT(S)/HR: 5000 INJECTION INTRAVENOUS; SUBCUTANEOUS at 07:52

## 2024-01-01 RX ADMIN — HEPARIN SODIUM 5000 UNIT(S): 5000 INJECTION INTRAVENOUS; SUBCUTANEOUS at 13:37

## 2024-01-01 RX ADMIN — MIDAZOLAM HYDROCHLORIDE 2 MILLIGRAM(S): 1 INJECTION, SOLUTION INTRAMUSCULAR; INTRAVENOUS at 02:29

## 2024-01-01 RX ADMIN — Medication 500 MICROGRAM(S): at 14:27

## 2024-01-01 RX ADMIN — HYDROMORPHONE HYDROCHLORIDE 0.5 MILLIGRAM(S): 2 INJECTION INTRAMUSCULAR; INTRAVENOUS; SUBCUTANEOUS at 15:25

## 2024-01-01 RX ADMIN — Medication 650 MILLIGRAM(S): at 15:51

## 2024-01-01 RX ADMIN — Medication 2: at 05:35

## 2024-01-01 RX ADMIN — Medication 3 MILLILITER(S): at 02:50

## 2024-01-01 RX ADMIN — DEXMEDETOMIDINE HYDROCHLORIDE IN 0.9% SODIUM CHLORIDE 3.71 MICROGRAM(S)/KG/HR: 4 INJECTION INTRAVENOUS at 16:22

## 2024-01-01 RX ADMIN — HYDROMORPHONE HYDROCHLORIDE 2 MILLIGRAM(S): 2 INJECTION INTRAMUSCULAR; INTRAVENOUS; SUBCUTANEOUS at 21:00

## 2024-01-01 RX ADMIN — Medication 500 MICROGRAM(S): at 08:45

## 2024-01-01 RX ADMIN — Medication 5 MILLIGRAM(S): at 18:30

## 2024-01-01 RX ADMIN — Medication 100 MILLIGRAM(S): at 22:17

## 2024-01-01 RX ADMIN — Medication 500 MICROGRAM(S): at 09:22

## 2024-01-01 RX ADMIN — Medication 3 MILLILITER(S): at 03:54

## 2024-01-01 RX ADMIN — CHLORHEXIDINE GLUCONATE 15 MILLILITER(S): 213 SOLUTION TOPICAL at 18:47

## 2024-01-01 RX ADMIN — Medication 80 MILLIGRAM(S): at 17:26

## 2024-01-01 RX ADMIN — Medication 50 MILLIGRAM(S): at 08:58

## 2024-01-01 RX ADMIN — Medication 3 MILLILITER(S): at 20:58

## 2024-01-01 RX ADMIN — SPIRONOLACTONE 25 MILLIGRAM(S): 25 TABLET, FILM COATED ORAL at 05:43

## 2024-01-01 RX ADMIN — Medication 1000 MILLIGRAM(S): at 17:02

## 2024-01-01 RX ADMIN — HEPARIN SODIUM 5000 UNIT(S): 5000 INJECTION INTRAVENOUS; SUBCUTANEOUS at 22:30

## 2024-01-01 RX ADMIN — DEXMEDETOMIDINE HYDROCHLORIDE IN 0.9% SODIUM CHLORIDE 3.71 MICROGRAM(S)/KG/HR: 4 INJECTION INTRAVENOUS at 08:18

## 2024-01-01 RX ADMIN — MEROPENEM 500 MILLIGRAM(S): 1 INJECTION INTRAVENOUS at 05:25

## 2024-01-01 RX ADMIN — Medication 50 MILLILITER(S): at 11:32

## 2024-01-01 RX ADMIN — Medication 80 MILLIGRAM(S): at 17:02

## 2024-01-01 RX ADMIN — Medication 1000 MILLIGRAM(S): at 17:27

## 2024-01-01 RX ADMIN — HYDROMORPHONE HYDROCHLORIDE 0.5 MILLIGRAM(S): 2 INJECTION INTRAMUSCULAR; INTRAVENOUS; SUBCUTANEOUS at 19:52

## 2024-01-01 RX ADMIN — BUMETANIDE 5 MG/HR: 0.25 INJECTION INTRAMUSCULAR; INTRAVENOUS at 19:15

## 2024-01-01 RX ADMIN — HYDROMORPHONE HYDROCHLORIDE 2 MILLIGRAM(S): 2 INJECTION INTRAMUSCULAR; INTRAVENOUS; SUBCUTANEOUS at 20:42

## 2024-01-01 RX ADMIN — Medication 3.47 MICROGRAM(S)/KG/MIN: at 06:33

## 2024-01-01 RX ADMIN — Medication 500 MICROGRAM(S): at 15:29

## 2024-01-01 RX ADMIN — Medication 25 GRAM(S): at 14:00

## 2024-01-01 RX ADMIN — HEPARIN SODIUM 5000 UNIT(S): 5000 INJECTION INTRAVENOUS; SUBCUTANEOUS at 21:06

## 2024-01-01 RX ADMIN — BUMETANIDE 2.5 MG/HR: 0.25 INJECTION INTRAMUSCULAR; INTRAVENOUS at 11:34

## 2024-01-01 RX ADMIN — CHLORHEXIDINE GLUCONATE 1 APPLICATION(S): 213 SOLUTION TOPICAL at 11:53

## 2024-01-01 RX ADMIN — Medication 3 MILLILITER(S): at 14:52

## 2024-01-01 RX ADMIN — Medication 20 MILLIGRAM(S): at 07:09

## 2024-01-01 RX ADMIN — Medication 100 MILLIGRAM(S): at 05:29

## 2024-01-01 RX ADMIN — LEVALBUTEROL 0.63 MILLIGRAM(S): 1.25 SOLUTION, CONCENTRATE RESPIRATORY (INHALATION) at 15:29

## 2024-01-01 RX ADMIN — PANTOPRAZOLE SODIUM 40 MILLIGRAM(S): 20 TABLET, DELAYED RELEASE ORAL at 12:06

## 2024-01-01 RX ADMIN — Medication 100 MILLIGRAM(S): at 05:32

## 2024-01-01 RX ADMIN — HEPARIN SODIUM 900 UNIT(S)/HR: 5000 INJECTION INTRAVENOUS; SUBCUTANEOUS at 19:00

## 2024-01-01 RX ADMIN — MIDAZOLAM HYDROCHLORIDE 2 MILLIGRAM(S): 1 INJECTION, SOLUTION INTRAMUSCULAR; INTRAVENOUS at 06:39

## 2024-01-01 RX ADMIN — Medication 650 MILLIGRAM(S): at 14:51

## 2024-01-01 RX ADMIN — Medication 3.47 MICROGRAM(S)/KG/MIN: at 12:06

## 2024-01-01 RX ADMIN — CHLORHEXIDINE GLUCONATE 15 MILLILITER(S): 213 SOLUTION TOPICAL at 18:17

## 2024-01-01 RX ADMIN — Medication 3 MILLILITER(S): at 08:58

## 2024-01-01 RX ADMIN — PROPOFOL 2.22 MICROGRAM(S)/KG/MIN: 10 INJECTION, EMULSION INTRAVENOUS at 00:10

## 2024-01-01 RX ADMIN — HEPARIN SODIUM 900 UNIT(S)/HR: 5000 INJECTION INTRAVENOUS; SUBCUTANEOUS at 19:20

## 2024-01-01 RX ADMIN — Medication 81 MILLIGRAM(S): at 14:05

## 2024-01-01 RX ADMIN — SIMVASTATIN 40 MILLIGRAM(S): 20 TABLET, FILM COATED ORAL at 21:21

## 2024-01-01 RX ADMIN — INSULIN HUMAN 5 UNIT(S): 100 INJECTION, SOLUTION SUBCUTANEOUS at 14:00

## 2024-01-01 RX ADMIN — PANTOPRAZOLE SODIUM 40 MILLIGRAM(S): 20 TABLET, DELAYED RELEASE ORAL at 11:34

## 2024-01-01 RX ADMIN — HEPARIN SODIUM 5000 UNIT(S): 5000 INJECTION INTRAVENOUS; SUBCUTANEOUS at 06:10

## 2024-01-01 RX ADMIN — Medication 75 MEQ/KG/HR: at 15:31

## 2024-01-01 RX ADMIN — LEVALBUTEROL 0.63 MILLIGRAM(S): 1.25 SOLUTION, CONCENTRATE RESPIRATORY (INHALATION) at 21:07

## 2024-01-01 RX ADMIN — SIMVASTATIN 40 MILLIGRAM(S): 20 TABLET, FILM COATED ORAL at 21:06

## 2024-01-01 RX ADMIN — DEXMEDETOMIDINE HYDROCHLORIDE IN 0.9% SODIUM CHLORIDE 3.71 MICROGRAM(S)/KG/HR: 4 INJECTION INTRAVENOUS at 10:29

## 2024-01-01 RX ADMIN — CHLORHEXIDINE GLUCONATE 15 MILLILITER(S): 213 SOLUTION TOPICAL at 05:29

## 2024-01-01 RX ADMIN — Medication 40 MILLIGRAM(S): at 17:24

## 2024-01-01 RX ADMIN — Medication 1000 MILLIGRAM(S): at 05:31

## 2024-01-01 RX ADMIN — Medication 81 MILLIGRAM(S): at 14:38

## 2024-01-01 RX ADMIN — HEPARIN SODIUM 1100 UNIT(S)/HR: 5000 INJECTION INTRAVENOUS; SUBCUTANEOUS at 07:58

## 2024-01-01 RX ADMIN — HYDROMORPHONE HYDROCHLORIDE 0.5 MG/HR: 2 INJECTION INTRAMUSCULAR; INTRAVENOUS; SUBCUTANEOUS at 20:42

## 2024-01-01 RX ADMIN — HEPARIN SODIUM 5000 UNIT(S): 5000 INJECTION INTRAVENOUS; SUBCUTANEOUS at 13:07

## 2024-01-01 RX ADMIN — BUMETANIDE 5 MG/HR: 0.25 INJECTION INTRAMUSCULAR; INTRAVENOUS at 02:18

## 2024-01-01 RX ADMIN — Medication 400 MILLIGRAM(S): at 10:14

## 2024-01-01 RX ADMIN — Medication 3 MILLILITER(S): at 22:06

## 2024-01-01 RX ADMIN — Medication 3.47 MICROGRAM(S)/KG/MIN: at 15:51

## 2024-01-01 RX ADMIN — HYDROMORPHONE HYDROCHLORIDE 0.5 MILLIGRAM(S): 2 INJECTION INTRAMUSCULAR; INTRAVENOUS; SUBCUTANEOUS at 20:00

## 2024-01-01 RX ADMIN — SIMVASTATIN 40 MILLIGRAM(S): 20 TABLET, FILM COATED ORAL at 20:16

## 2024-01-01 RX ADMIN — Medication 3 MILLILITER(S): at 03:49

## 2024-01-01 RX ADMIN — Medication 81 MILLIGRAM(S): at 11:21

## 2024-01-01 RX ADMIN — Medication 500 MICROGRAM(S): at 04:01

## 2024-01-01 RX ADMIN — Medication 81 MILLIGRAM(S): at 17:03

## 2024-01-01 RX ADMIN — HYDROMORPHONE HYDROCHLORIDE 1 MILLIGRAM(S): 2 INJECTION INTRAMUSCULAR; INTRAVENOUS; SUBCUTANEOUS at 21:30

## 2024-01-01 RX ADMIN — Medication 3 MILLILITER(S): at 20:51

## 2024-01-01 RX ADMIN — FENTANYL CITRATE 50 MICROGRAM(S): 50 INJECTION INTRAVENOUS at 23:15

## 2024-01-01 RX ADMIN — Medication 50 MILLIGRAM(S): at 18:33

## 2024-01-01 RX ADMIN — FENTANYL CITRATE 25 MICROGRAM(S): 50 INJECTION INTRAVENOUS at 12:33

## 2024-01-01 RX ADMIN — Medication 200 GRAM(S): at 05:29

## 2024-01-01 RX ADMIN — HEPARIN SODIUM 0 UNIT(S)/HR: 5000 INJECTION INTRAVENOUS; SUBCUTANEOUS at 06:45

## 2024-01-01 RX ADMIN — HEPARIN SODIUM 0 UNIT(S)/HR: 5000 INJECTION INTRAVENOUS; SUBCUTANEOUS at 06:50

## 2024-01-01 RX ADMIN — Medication 1000 MILLIGRAM(S): at 05:39

## 2024-01-01 RX ADMIN — Medication 50 MILLIGRAM(S): at 05:02

## 2024-01-01 RX ADMIN — Medication 1000 MILLIGRAM(S): at 02:20

## 2024-01-01 RX ADMIN — HEPARIN SODIUM 1300 UNIT(S)/HR: 5000 INJECTION INTRAVENOUS; SUBCUTANEOUS at 18:51

## 2024-01-01 RX ADMIN — Medication 40 MILLIGRAM(S): at 13:32

## 2024-01-01 RX ADMIN — Medication 2: at 18:48

## 2024-01-01 RX ADMIN — Medication 1000 MILLIGRAM(S): at 06:21

## 2024-01-01 RX ADMIN — Medication 1000 MILLIGRAM(S): at 02:50

## 2024-02-23 NOTE — ED PROVIDER NOTE - ATTENDING CONTRIBUTION TO CARE
83-year-old female; with PMH significant for CAD (s/p CABG), HTN, HLD, CHF (on as needed O2 at home); now presenting with increasing dyspnea on exertion and shortness of breath x 2 to 3 days with increasing O2 requirement at home.  Family also notes patient with increasing lower extremity erythema over the past 1 week with increasing generalized malaise.  Patient denies any fever at home.  Denies nausea or vomiting.  Denies cough or congestion.  Denies abdominal pain.  Denies dysuria, frequency, urgency.  Patient had increased dosing of Lasix over the past 1 week to address her increasing lower extremity edema but erythema over the left lower extremity remains.  General:     NAD  Head:     NC/AT, EOMI, oral mucosa moist  Neck:     trachea midline  Lungs:     bilateral lower lung crackles.   CVS:     S1S2, tachy, no m/g/r  Abd:     +BS, s/nt/nd, no organomegaly  Ext:    2+ radial and pedal pulses, erythema over L LE, with warm. no crepitus  Neuro: AAOx3, no sensory/motor deficits  A/P:  83yoF p/w increasing reyes/sob with increasing o2 requirement at home. also with leg swelling and erythema. likely worsening chf with possible early cellulitis,  -labs, diuresis, abx, and likely admission for cardiology consult and w/up.

## 2024-02-23 NOTE — ED PROVIDER NOTE - CLINICAL SUMMARY MEDICAL DECISION MAKING FREE TEXT BOX
83-year-old female with past medical history of coronary artery disease, CABG, CHF presenting with left greater than right lower leg pain, generalized weakness, shortness of breath resulting in increased home O2 usage.  Patient has 4 L as needed at home.  States that for the past day she has had difficulty ambulating and has needed help from her family to move.  Patient denies any chest pain, cough, congestion, abdominal pain, nausea, vomiting, diarrhea.  Patient is on Lasix and urinates frequently, denies any change in the frequency or urine or dysuria. 83-year-old female with past medical history of coronary artery disease, CABG, CHF presenting with generalized weakness and increased O2 requirement. patient was on 4L PRN but now requires daily use. patient well appearing on exam, no respiratory distress, O2sat stable on 4L, mild crackles to the bases, L >R BLE slightly concerning for dermatitis. cxr with possible right infiltrate. patient covered with vancomycin and ceftriaxone. workup concerning for reduced renal function and elevated BNP. HIE review shows an BNP of 1838 in 2022 and a GFR of 46 with normal creatinine. lasix given in ED, patient to be admitted for further treatment.

## 2024-02-23 NOTE — H&P ADULT - ASSESSMENT
83 year old female with Hypertension, Diabetes, COPD on intermittent 4LNC, CHF?rEF, CAD s/p CABG, MVR and TAVR presents with swelling of legs for past 2 months, gotten worse over the past 4 days now painful affecting ambulation Supplemental O2 for the past 2 months intermittently but has been using it consistently recently.    Normoxic on 4L with stable vitals otherwise. Normal WBC but has left shift.  Mildly elevated Troponin, SCr 2.02 BNP 5754 VBG with elevated pCO2. UA with glucosuria  Chest X-Ray with cardiomegaly. EKG with TWI in lateral leads  Received Ceftriaxone Vancomycin and Furosemide 20mg IV x2      # CHF reduced EFm acute on chronic  - Admit to telemetry  - I/O, daily weight, salt and fluid restriction.  - Furosemide 40mg IV twice daily, monitor renal functions  - Metoprolol to be continues  - Hold ACEI given LE swelling MARGARITA  - Check TTE  - Cardiology consultation    # Cellulitis, Bilateral LE  - Cefazolin 1gm q12  - Follow up cultures  - Keep legs elevated    # MARGARITA on CKD3b  Baseline SCr ~ 1.6  as per cardiology  - I/O, monitor renal function  - avoid Nephrotoxins, (ACEI on hold)  - US KUB    # CAD  indeterminate Troponin in setting of renal failure  - Monitor  - ASA, Statin, BB  - cardiology follow up    # T2DM  - Hold oral hypoglycemic agent  - Blood glucose monitoring with sliding scale Lispro  - A1c in am    # COPD  Mild hypercarbia  Patient stopped using Stiolto at home.  - Supplemental O2, target SpO2 88-94  - Albuterol/Ipratropium Nebulizer PRN  - Follow up with Pulmonology as outpatient    VTE Prophylaxis  - Intermittent Venous compression device bilaterally  Mobilize, increase as tolerated    Discussed with patient and daughter at bedside  Discussed with Cardiology earlier

## 2024-02-23 NOTE — ED PROVIDER NOTE - OBJECTIVE STATEMENT
83-year-old female with past medical history of coronary artery disease, CABG, CHF presenting with left greater than right lower leg pain, generalized weakness, shortness of breath resulting in increased home O2 usage.  Patient has 4 L as needed at home.  States that for the past day she has had difficulty ambulating and has needed help from her family to move.  Patient denies any chest pain, cough, congestion, abdominal pain, nausea, vomiting, diarrhea.  Patient is on Lasix and urinates frequently, denies any change in the frequency or urine or dysuria.

## 2024-02-23 NOTE — H&P ADULT - NSHPSOCIALHISTORY_GEN_ALL_CORE
Lives with daughter  Quit smoking long ago; spouse had asbestosis  Denies any alcohol or drug use  Ambulate without walker or cane

## 2024-02-23 NOTE — ED PROVIDER NOTE - NS ED ROS FT
General: Denies fever, chills  HEENT: Denies sore throat  Neck: Denies neck pain  Resp: Denies coughing. SOB  Cardiovascular: Denies CP, palpitations. LE edema  GI: Denies nausea, vomiting, abdominal pain, diarrhea, constipation, blood in stool  : Denies dysuria, hematuria  MSK: Denies back pain  Neuro: Denies HA, dizziness, numbness. weakness  Skin: Denies rashes.

## 2024-02-23 NOTE — ED PROVIDER NOTE - NS ED MD DISPO DIVISION
44 y/o F presents to the ED with 2 days of feeling anxious with associated pounding palpitations and restlessness. She states her symptoms are more noticeable when she is at rest, and they tend to improve when she is moving around or busy. She states she has been prescribed clonazepam in the past for these symptoms which tends to help. She admits to increased stress recently.    Denies fever, chills, headache, dizziness, CP, SOB, abdo pain, N/V/D, acute numbness or weakness
[facility]

## 2024-02-23 NOTE — H&P ADULT - HISTORY OF PRESENT ILLNESS
83 year old female with Hypertension, Diabetes, COPD on intermittent 4LNC, CHF?rEF, CAD s/p CABG, MVR and TAVR presents with swelling of legs for past 2 months. She states they are full of water and  but have gotten worse over the past 4 days where they've become pain and she cant stand up and move around. Prior to that patient was ambulatory without any assistive devices though ambulation limited to short distances by exertional dyspnea. She has also been supplemental O2 for the past 2 months intermittently but has been using it consistently recently. Denies any chest pain, cough, dizziness, dyspnea, palpitations, PMD or orthopnea but feels weak.

## 2024-02-23 NOTE — ED ADULT NURSE REASSESSMENT NOTE - NS ED NURSE REASSESS COMMENT FT1
Report given to GEOVANY Shannon. Pt awaiting diet and activity order to move to CDU. Dr. Gallegos made aware.
Pt A&Ox4 in NAD @ this time. RR even & unlabored. Pt on 4L NC. VS as documented. Pt NSR on cardiac monitor. Pt awaiting admission. Safety maintained.     Pt taken to CDU2R.
Assumed care of pt from GEOVANY Chilel.     Pt laying in bed, resting. Pt A&Ox4 in NAD @ this time. RR even & unlabored. Pt on 4L NC @ baseline. VS as documented. Pt NSR on cardiac monitor. Pt awaiting admission. Safety maintained.

## 2024-02-23 NOTE — ED ADULT NURSE NOTE - OBJECTIVE STATEMENT
83-year-old female with past medical history of coronary artery disease, CABG, CHF presenting with left greater than right lower leg pain, generalized weakness, shortness of breath resulting in increased home O2 usage.  Patient has 4 L as needed at home.  States that for the past day she has had difficulty ambulating and has needed help from her family to move.  Seen and evaluated by provider, orders obtained and noted.  IV line placed, blood specimens and nasal swab obtained and sent to lab.  Awaiting results.  Family at bedside.  Resting comfortably on stretcher in no acute distress.  Offers no further complaints at this time.

## 2024-02-23 NOTE — H&P ADULT - NSHPPHYSICALEXAM_GEN_ALL_CORE
OBJECTIVE:  Vital Signs Last 24 Hrs  T(C): 36.4 (23 Feb 2024 09:01), Max: 36.4 (23 Feb 2024 09:01)  T(F): 97.5 (23 Feb 2024 09:01), Max: 97.5 (23 Feb 2024 09:01)  HR: 75 (23 Feb 2024 09:01) (68 - 80)  BP: 119/59 (23 Feb 2024 09:01) (102/62 - 126/65)  BP(mean): 81 (23 Feb 2024 09:01) (75 - 89)  RR: 20 (23 Feb 2024 09:01) (20 - 22)  SpO2: 97% (23 Feb 2024 09:01) (92% - 99%)    Parameters below as of 23 Feb 2024 09:01  Patient On (Oxygen Delivery Method): nasal cannula  O2 Flow (L/min): 4      PHYSICAL EXAMINATION  General: Overweight elderly female sitting up in bed, comfortable  HEENT:  Arcus senilis, pupils equal, responsive, reactive to light and accomodation, extraocular movements intact, 4LNC  NECK:  Supple  CVS: regular rate and rhythm S1 S2  RESP:  Normal work of breathing, Fair air entry, left basilar crackles, no wheeze  GI:  Soft nondistended nontender BS+  : No suprapubic tenderness  MSK:  Bilateral lower extremities with erythema, warm, tenderness from below knee to ankle circumferentially with some bulla. Left leg more affected than right leg; Scar left leg well healed  CNS:  Awake, alert oriented, able to lift extremities off bed  INTEG:  Lower extremities with cellulitis changes  PSYCH:  Fair mood

## 2024-02-23 NOTE — CONSULT NOTE ADULT - SUBJECTIVE AND OBJECTIVE BOX
[facility], THE HEART CENTER                                   [address] [place] [address]                                                      PHONE: [phone]                                                         FAX: [phone]  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 Patient states that her leg has been weeping and that she has been having worsening swelling. She states that this has been happening for about a month. She used to not wear any oxygen at home however over the past month she has needed to. She notes that she was taking extra water pills to try and get the swelling down however they remained swollen. Patient came to the ER. Patient denies any chest pain, SOB, palpitations, near syncope, or syncope. Patient states that her breathing is stable and that it is not difficult.     PAST MEDICAL & SURGICAL HISTORY:  CAD (coronary artery disease)      Congestive heart failure (CHF)      S/P CABG (coronary artery bypass graft)          No Known Allergies      MEDICATIONS  (STANDING):    MEDICATIONS  (PRN):      Social History:  Cigarettes:  Denies current tobacco use                  Alcohol:  Denies daily etoh            Illicit Drug Abuse:  Denies    Family History:  denies sudden death.    ROS: Negative other than as mentioned in HPI.    Vital Signs Last 24 Hrs  T(C): 36.3 (2024 04:24), Max: 36.3 (2024 01:36)  T(F): 97.4 (2024 04:24), Max: 97.4 (2024 01:36)  HR: 75 (2024 09:01) (68 - 80)  BP: 119/59 (2024 09:01) (102/62 - 126/65)  BP(mean): 81 (2024 09:01) (75 - 89)  RR: 20 (2024 09:01) (20 - 22)  SpO2: 97% (2024 09:01) (92% - 99%)    Parameters below as of 2024 09:01  Patient On (Oxygen Delivery Method): nasal cannula  O2 Flow (L/min): 4    ICU Vital Signs Last 24 Hrs  BEBETO DURÁN  I&O's Detail    I&O's Summary    Drug Dosing Weight  BEBETO DURÁN      PHYSICAL EXAM:  General: NAD  HEENT: Head; normocephalic, atraumatic.  Eyes: EOMI, conjunctiva normal  Neck: Supple  CARDIOVASCULAR: RRR, S1 S2, No murmurs or gallops  LUNGS: Clear to auscultation b/l, No rales, rhonchi or wheeze, normal inspiratory effort  ABDOMEN: Soft, non-distended  EXTREMITIES: Legs with erythema with skin breakdown to the left shin area. 1+ edema b/l   SKIN: warm and dry  NEURO: Alert  PSYCH: normal affect.        LABS:                        13.5   9.61  )-----------( 184      ( 2024 02:40 )             43.0     02-    140  |  105  |  32.9<H>  ----------------------------<  95  4.8   |  24.0  |  2.02<H>    Ca    8.8      2024 02:40    TPro  7.2  /  Alb  4.0  /  TBili  0.4  /  DBili  x   /  AST  17  /  ALT  13  /  AlkPhos  94  02-23    BEBETO DURÁN      PT/INR - ( 2024 02:40 )   PT: 13.3 sec;   INR: 1.20 ratio         PTT - ( 2024 02:40 )  PTT:30.2 sec  Urinalysis Basic - ( 2024 06:00 )    Color: Yellow / Appearance: Clear / S.016 / pH: x  Gluc: x / Ketone: Negative mg/dL  / Bili: Negative / Urobili: 1.0 mg/dL   Blood: x / Protein: Trace mg/dL / Nitrite: Negative   Leuk Esterase: Negative / RBC: x / WBC x   Sq Epi: x / Non Sq Epi: x / Bacteria: x        RADIOLOGY & ADDITIONAL STUDIES:    INTERPRETATION OF TELEMETRY (personally reviewed): Sinus rhythm    ECG (personally reviewed): Sinus rhythm, PVCs, NSST changes    ECHO: 2023  -EF 40-45%. RVSP 65mmHg. 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    Acute on Chronic Heart Failure Exacerbation  -BNP 5754  -patient currently on 4L NC however only appears mildly overloaded on exam  -s/p lasix 40mg IV in the ER  -agree with lasix 40mg IV BID for now  -would watch creatinine closely. baseline creatinine 1.6-1.7  -daily weights  -strict I/O  -daily chemistry to evaluate electrolytes and renal function    Thank you for letting [facility] to assist in the management of this patient. Please call with any questions.

## 2024-02-23 NOTE — PATIENT PROFILE ADULT - FALL HARM RISK - HARM RISK INTERVENTIONS

## 2024-02-23 NOTE — ED PROVIDER NOTE - PHYSICAL EXAMINATION
General: Awake, alert, lying in bed in NAD  HEENT: Normocephalic, atraumatic. No scleral icterus or conjunctival injection. EOMI. Moist mucous membranes. Oropharynx clear.   Neck:. Soft and supple.  Cardiac: irregular rhythm, Peripheral pulses 2+ and symmetric. L > R pitting edema with weeping, crusting, erythema, and warmth from the ankle to mid calf.  Resp: Lungs CTAB. No accessory muscle use  Abd: Soft, non-tender, non-distended. No guarding, rebound, or rigidity.  Back: Spine midline and non-tender.   Skin: L > R pitting edema with weeping, crusting, erythema, and warmth from the ankle to mid calf.  Neuro: AO x 4. Moves all extremities symmetrically. Motor strength and sensation grossly intact. 5/5 strength to BLE  Psych: Appropriate mood and affect

## 2024-02-23 NOTE — H&P ADULT - NSICDXPASTMEDICALHX_GEN_ALL_CORE_FT
PAST MEDICAL HISTORY:  Borderline diabetes     CAD (coronary artery disease)     Congestive heart failure (CHF)     COPD without exacerbation     HTN (hypertension)     S/P MVR (mitral valve repair)     S/P TAVR (transcatheter aortic valve replacement)

## 2024-02-24 NOTE — PROGRESS NOTE ADULT - SUBJECTIVE AND OBJECTIVE BOX
[facility], THE HEART CENTER                                   [address] [place] [address]                                                      PHONE: [phone]                                                         FAX: [phone]  [email or web address]  ---------------------------------------------------------------------------------------------------------------------------------    Reason for Consult: Heart failure    HPI:  BEBETO DURÁN is an 83y Female with a PMHx of CAD s/p 5v CABG, AS s/p TAVR (), mitral valve annuloplasty, 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 Patient states that her leg has been weeping and that she has been having worsening swelling. She states that this has been happening for about a month. She used to not wear any oxygen at home however over the past month she has needed to. She notes that she was taking extra water pills to try and get the swelling down however they remained swollen. Patient came to the ER. Patient denies any chest pain, SOB, palpitations, near syncope, or syncope. Patient states that her breathing is stable and that it is not difficult.     RECENT EVENTS        PAST MEDICAL & SURGICAL HISTORY:  CAD (coronary artery disease)      Congestive heart failure (CHF)      S/P CABG (coronary artery bypass graft)          No Known Allergies      MEDICATIONS  (STANDING):    MEDICATIONS  (PRN):      Social History:  Cigarettes:  Denies current tobacco use                  Alcohol:  Denies daily etoh            Illicit Drug Abuse:  Denies    Family History:  denies sudden death.    ROS: Negative other than as mentioned in HPI.    Vital Signs Last 24 Hrs  T(C): 36.3 (2024 04:24), Max: 36.3 (2024 01:36)  T(F): 97.4 (2024 04:24), Max: 97.4 (2024 01:36)  HR: 75 (2024 09:01) (68 - 80)  BP: 119/59 (2024 09:01) (102/62 - 126/65)  BP(mean): 81 (2024 09:01) (75 - 89)  RR: 20 (2024 09:01) (20 - 22)  SpO2: 97% (2024 09:01) (92% - 99%)    Parameters below as of 2024 09:01  Patient On (Oxygen Delivery Method): nasal cannula  O2 Flow (L/min): 4    ICU Vital Signs Last 24 Hrs  BEBETO DURÁN  I&O's Detail    I&O's Summary    Drug Dosing Weight  BEBETO DURÁN      PHYSICAL EXAM:  General: NAD  HEENT: Head; normocephalic, atraumatic.  Eyes: EOMI, conjunctiva normal  Neck: Supple  CARDIOVASCULAR: RRR, S1 S2, No murmurs or gallops  LUNGS: Clear to auscultation b/l, No rales, rhonchi or wheeze, normal inspiratory effort  ABDOMEN: Soft, non-distended  EXTREMITIES: Legs with erythema with skin breakdown to the left shin area. 1+ edema b/l   SKIN: warm and dry  NEURO: Alert  PSYCH: normal affect.        LABS:                        13.5   9.61  )-----------( 184      ( 2024 02:40 )             43.0     02    140  |  105  |  32.9<H>  ----------------------------<  95  4.8   |  24.0  |  2.02<H>    Ca    8.8      2024 02:40    TPro  7.2  /  Alb  4.0  /  TBili  0.4  /  DBili  x   /  AST  17  /  ALT  13  /  AlkPhos  94  02-23    BEBETO DURÁN      PT/INR - ( 2024 02:40 )   PT: 13.3 sec;   INR: 1.20 ratio         PTT - ( 2024 02:40 )  PTT:30.2 sec  Urinalysis Basic - ( 2024 06:00 )    Color: Yellow / Appearance: Clear / S.016 / pH: x  Gluc: x / Ketone: Negative mg/dL  / Bili: Negative / Urobili: 1.0 mg/dL   Blood: x / Protein: Trace mg/dL / Nitrite: Negative   Leuk Esterase: Negative / RBC: x / WBC x   Sq Epi: x / Non Sq Epi: x / Bacteria: x        RADIOLOGY & ADDITIONAL STUDIES:    INTERPRETATION OF TELEMETRY (personally reviewed): Sinus rhythm    ECG (personally reviewed): Sinus rhythm, PVCs, NSST changes    ECHO: 2023  -EF 40-45%. RVSP 65mmHg. 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    Acute on Chronic Heart Failure Exacerbation    -tele monitoring   -BNP 5754  -cont Diuresis with lasix 40mg IV BID for now  -would watch creatinine closely. baseline creatinine 1.6-1.7  -daily weights  -strict I/O  -daily chemistry to evaluate electrolytes and renal function    Thank you for letting [facility] to assist in the management of this patient. Please call with any questions.                 [facility], [facility]                                   [address] [place] [address]                                                      PHONE: [phone]                                                         FAX: [phone]  [email or web address]  ---------------------------------------------------------------------------------------------------------------------------------    Reason for Consult: Heart failure    HPI:  BEBETO DURÁN is an 83y Female with a PMHx of CAD s/p 5v CABG, AS s/p TAVR (), mitral valve annuloplasty, 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 Patient states that her leg has been weeping and that she has been having worsening swelling. She states that this has been happening for about a month. She used to not wear any oxygen at home however over the past month she has needed to. She notes that she was taking extra water pills to try and get the swelling down however they remained swollen. Patient came to the ER. Patient denies any chest pain, SOB, palpitations, near syncope, or syncope. Patient states that her breathing is stable and that it is not difficult.     RECENT EVENTS    comfortable  Improving less SOB  No CP      PAST MEDICAL & SURGICAL HISTORY:  CAD (coronary artery disease)      Congestive heart failure (CHF)      S/P CABG (coronary artery bypass graft)          No Known Allergies      MEDICATIONS  (STANDING):    MEDICATIONS  (PRN):      Social History:  Cigarettes:  Denies current tobacco use                  Alcohol:  Denies daily etoh            Illicit Drug Abuse:  Denies    Family History:  denies sudden death.    ROS: Negative other than as mentioned in HPI.    Vital Signs Last 24 Hrs  T(C): 36.3 (2024 04:24), Max: 36.3 (2024 01:36)  T(F): 97.4 (2024 04:24), Max: 97.4 (2024 01:36)  HR: 75 (2024 09:01) (68 - 80)  BP: 119/59 (2024 09:01) (102/62 - 126/65)  BP(mean): 81 (2024 09:01) (75 - 89)  RR: 20 (2024 09:01) (20 - 22)  SpO2: 97% (2024 09:01) (92% - 99%)    Parameters below as of 2024 09:01  Patient On (Oxygen Delivery Method): nasal cannula  O2 Flow (L/min): 4    ICU Vital Signs Last 24 Hrs  BEBETO DURÁN  I&O's Detail    I&O's Summary    Drug Dosing Weight  BEBETO DURÁN      PHYSICAL EXAM:  General: NAD  HEENT: Head; normocephalic, atraumatic.  Eyes: EOMI, conjunctiva normal  Neck: Supple  CARDIOVASCULAR: RRR, S1 S2, No murmurs or gallops  LUNGS: Clear to auscultation b/l, No rales, rhonchi or wheeze, normal inspiratory effort  ABDOMEN: Soft, non-distended  EXTREMITIES: Legs with erythema with skin breakdown to the left shin area. 1+ edema b/l   SKIN: warm and dry  NEURO: Alert  PSYCH: normal affect.        LABS:                        13.5   9.61  )-----------( 184      ( 2024 02:40 )             43.0         140  |  105  |  32.9<H>  ----------------------------<  95  4.8   |  24.0  |  2.02<H>    Ca    8.8      2024 02:40    TPro  7.2  /  Alb  4.0  /  TBili  0.4  /  DBili  x   /  AST  17  /  ALT  13  /  AlkPhos  94  02-23    BEBETO DURÁN      PT/INR - ( 2024 02:40 )   PT: 13.3 sec;   INR: 1.20 ratio         PTT - ( 2024 02:40 )  PTT:30.2 sec  Urinalysis Basic - ( 2024 06:00 )    Color: Yellow / Appearance: Clear / S.016 / pH: x  Gluc: x / Ketone: Negative mg/dL  / Bili: Negative / Urobili: 1.0 mg/dL   Blood: x / Protein: Trace mg/dL / Nitrite: Negative   Leuk Esterase: Negative / RBC: x / WBC x   Sq Epi: x / Non Sq Epi: x / Bacteria: x        RADIOLOGY & ADDITIONAL STUDIES:    INTERPRETATION OF TELEMETRY (personally reviewed): Sinus rhythm    ECG (personally reviewed): Sinus rhythm, PVCs, NSST changes    ECHO: 2023  -EF 40-45%. RVSP 65mmHg. 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    Acute on Chronic Heart Failure Exacerbation    -tele monitoring   -BNP 5754  -cont Diuresis with lasix 40mg IV BID for now  -would watch creatinine closely. baseline creatinine 1.6-1.7  -daily weights  -strict I/O  -daily chemistry to evaluate electrolytes and renal function    Thank you for letting [facility] to assist in the management of this patient. Please call with any questions.

## 2024-02-24 NOTE — PROGRESS NOTE ADULT - ASSESSMENT
83 year old female with Hypertension, Diabetes, COPD on intermittent 4LNC, CHF?rEF, CAD s/p CABG, MVR and TAVR presents with swelling of legs for past 2 months, gotten worse over the past 4 days now painful affecting ambulation Supplemental O2 for the past 2 months intermittently but has been using it consistently recently.  Admitted for acute heart failure exacerbation.      #CHF reduced EF   Admit to telemetry  I/O, daily weight, salt and fluid restriction.  Lasix 40 IV BID  Metoprolol  Hold Ace inhibitor given MARGARITA  TTE with EF of 30-35% and severe PH  Cardio recs appreciated    #Cellulitis, Bilateral LE  Cefazolin 1gm q12  Follow up cultures  Keep legs elevated    # MARGARITA on CKD3b  Baseline SCr ~ 1.6  as per cardiology  I/O, monitor renal function  avoid Nephrotoxins, (ACEI on hold)    # CAD  indeterminate Troponin in setting of renal failure  Monitor  ASA, Statin, BB  Cardio recs appreicated    # T2DM  Hold oral hypoglycemic agent  Blood glucose monitoring with sliding scale Lispro    # COPD  Mild hypercarbia  Patient stopped using Stiolto at home.  Supplemental O2, target SpO2 88-94  Albuterol/Ipratropium Nebulizer PRN  Follow up with Pulmonology as outpatient    VTE Prophylaxis: Intermittent Venous compression device bilaterally  Mobilize, increase as tolerated    Discussed with patient and daughter at bedside    Dispo: Pending course

## 2024-02-24 NOTE — PROGRESS NOTE ADULT - SUBJECTIVE AND OBJECTIVE BOX
Hospitalist Daily Progress Note    Chief Complaint:  Patient is a 83y old  Female who presents with a chief complaint of Legs full of water  Cellulitis  CHF (24 Feb 2024 09:40)      SUBJECTIVE / OVERNIGHT EVENTS:  Patient was seen and examined at bedside. States legs are better.   Patient denies chest pain, SOB, abd pain, N/V, fever, chills, dysuria or any other complaints. All remainder ROS negative.     MEDICATIONS  (STANDING):  albuterol/ipratropium for Nebulization 3 milliLiter(s) Nebulizer every 6 hours  aspirin enteric coated 81 milliGRAM(s) Oral daily  ceFAZolin  Injectable. 1000 milliGRAM(s) IV Push every 12 hours  dextrose 5%. 1000 milliLiter(s) (100 mL/Hr) IV Continuous <Continuous>  dextrose 5%. 1000 milliLiter(s) (50 mL/Hr) IV Continuous <Continuous>  dextrose 50% Injectable 25 Gram(s) IV Push once  dextrose 50% Injectable 12.5 Gram(s) IV Push once  dextrose 50% Injectable 25 Gram(s) IV Push once  furosemide   Injectable 40 milliGRAM(s) IV Push every 12 hours  glucagon  Injectable 1 milliGRAM(s) IntraMuscular once  insulin lispro (ADMELOG) corrective regimen sliding scale   SubCutaneous at bedtime  insulin lispro (ADMELOG) corrective regimen sliding scale   SubCutaneous three times a day before meals  metoprolol succinate ER 50 milliGRAM(s) Oral daily  simvastatin 40 milliGRAM(s) Oral at bedtime    MEDICATIONS  (PRN):  acetaminophen     Tablet .. 650 milliGRAM(s) Oral every 6 hours PRN Temp greater or equal to 38C (100.4F), Mild Pain (1 - 3), Moderate Pain (4 - 6)  dextrose Oral Gel 15 Gram(s) Oral once PRN Blood Glucose LESS THAN 70 milliGRAM(s)/deciliter        I&O's Summary    23 Feb 2024 07:01  -  24 Feb 2024 07:00  --------------------------------------------------------  IN: 340 mL / OUT: 500 mL / NET: -160 mL    24 Feb 2024 07:01  -  24 Feb 2024 15:05  --------------------------------------------------------  IN: 440 mL / OUT: 0 mL / NET: 440 mL        PHYSICAL EXAM:  Vital Signs Last 24 Hrs  T(C): 36.4 (24 Feb 2024 12:02), Max: 36.8 (23 Feb 2024 18:21)  T(F): 97.6 (24 Feb 2024 12:02), Max: 98.3 (23 Feb 2024 20:54)  HR: 68 (24 Feb 2024 12:02) (57 - 76)  BP: 97/57 (24 Feb 2024 12:02) (96/55 - 115/68)  BP(mean): --  RR: 18 (24 Feb 2024 12:02) (17 - 20)  SpO2: 94% (24 Feb 2024 12:02) (92% - 97%)    Parameters below as of 24 Feb 2024 12:02  Patient On (Oxygen Delivery Method): nasal cannula  O2 Flow (L/min): 3        Constitutional: NAD, Resting  ENT: Supple, No JVD  Lungs: CTA B/L, Non-labored breathing  Cardio: RRR, S1/S2, No murmur  Abdomen: Soft, Nontender, Nondistended; Bowel sounds present  Extremities: No calf tenderness, B/L LE swelling, erythema noted  Musculoskeletal:   No joint swelling  Psych: Calm, cooperative affect appropriate  Neuro: Awake and alert, oriented x4  Skin: No rashes; no palpable lesions    LABS:                        12.0   8.50  )-----------( 162      ( 24 Feb 2024 04:30 )             37.8     02-24    143  |  106  |  34.7<H>  ----------------------------<  94  4.6   |  24.0  |  1.72<H>    Ca    8.3<L>      24 Feb 2024 04:30  Mg     2.4     02-24    TPro  6.1<L>  /  Alb  3.4  /  TBili  0.5  /  DBili  x   /  AST  12  /  ALT  7   /  AlkPhos  77  02-24    PT/INR - ( 23 Feb 2024 02:40 )   PT: 13.3 sec;   INR: 1.20 ratio         PTT - ( 23 Feb 2024 02:40 )  PTT:30.2 sec      Urinalysis Basic - ( 24 Feb 2024 04:30 )    Color: x / Appearance: x / SG: x / pH: x  Gluc: 94 mg/dL / Ketone: x  / Bili: x / Urobili: x   Blood: x / Protein: x / Nitrite: x   Leuk Esterase: x / RBC: x / WBC x   Sq Epi: x / Non Sq Epi: x / Bacteria: x        Culture - Urine (collected 23 Feb 2024 06:00)  Source: Clean Catch Clean Catch (Midstream)  Final Report (24 Feb 2024 11:29):    No growth    Culture - Blood (collected 23 Feb 2024 02:40)  Source: .Blood Blood-Venous  Preliminary Report (24 Feb 2024 07:02):    No growth at 24 hours    Culture - Blood (collected 23 Feb 2024 02:35)  Source: .Blood Blood-Venous  Preliminary Report (24 Feb 2024 07:02):    No growth at 24 hours      CAPILLARY BLOOD GLUCOSE      POCT Blood Glucose.: 123 mg/dL (24 Feb 2024 12:03)  POCT Blood Glucose.: 85 mg/dL (24 Feb 2024 08:00)  POCT Blood Glucose.: 110 mg/dL (23 Feb 2024 21:08)  POCT Blood Glucose.: 102 mg/dL (23 Feb 2024 17:09)        RADIOLOGY REVIEWED

## 2024-02-25 NOTE — PROGRESS NOTE ADULT - ASSESSMENT
83 year old female with Hypertension, Diabetes, COPD on intermittent 4LNC, CHF?rEF, CAD s/p CABG, MVR and TAVR presents with swelling of legs for past 2 months, gotten worse over the past 4 days now painful affecting ambulation Supplemental O2 for the past 2 months intermittently but has been using it consistently recently.  Admitted for acute heart failure exacerbation.      #CHF reduced EF   Telemetry MONITORING  I/O, daily weight, salt and fluid restriction.  Lasix 40 IV BID  Metoprolol  Hold Ace inhibitor given MARGARITA  TTE with EF of 30-35% and severe PH  Cardio recs appreciated    #Cellulitis, Bilateral LE  Cefazolin 1gm q12  Follow up cultures  Keep legs elevated    # MARGARITA on CKD3b  Baseline SCr ~ 1.6  as per cardiology  I/O, monitor renal function  avoid Nephrotoxins, (ACEI on hold)  CR improving with diuresis     # CAD  indeterminate Troponin in setting of renal failure  Monitor  ASA, Statin, BB  Cardio recs appreciated    # T2DM  Hold oral hypoglycemic agent  Blood glucose monitoring with sliding scale Lispro    # COPD  Mild hypercarbia  Patient stopped using Stiolto at home.  Supplemental O2, target SpO2 88-94  Albuterol/Ipratropium Nebulizer PRN  Follow up with Pulmonology as outpatient    VTE Prophylaxis: Heparin ordered  Intermittent Venous compression device bilaterally  Mobilize, increase as tolerated    Discussed with patient and daughter at bedside    Dispo: Pending course

## 2024-02-25 NOTE — PROGRESS NOTE ADULT - SUBJECTIVE AND OBJECTIVE BOX
Hospitalist Daily Progress Note    Chief Complaint:  Patient is a 83y old  Female who presents with a chief complaint of Legs full of water  Cellulitis  CHF (24 Feb 2024 15:02)      SUBJECTIVE / OVERNIGHT EVENTS:  Patient was seen and examined at bedside. States to still not be back to baseline.  Mildly SOB  Patient denies chest pain, SOB, abd pain, N/V, fever, chills, dysuria or any other complaints. All remainder ROS negative.     MEDICATIONS  (STANDING):  albuterol/ipratropium for Nebulization 3 milliLiter(s) Nebulizer every 6 hours  aspirin enteric coated 81 milliGRAM(s) Oral daily  ceFAZolin  Injectable. 1000 milliGRAM(s) IV Push every 12 hours  dextrose 5%. 1000 milliLiter(s) (100 mL/Hr) IV Continuous <Continuous>  dextrose 5%. 1000 milliLiter(s) (50 mL/Hr) IV Continuous <Continuous>  dextrose 50% Injectable 25 Gram(s) IV Push once  dextrose 50% Injectable 12.5 Gram(s) IV Push once  dextrose 50% Injectable 25 Gram(s) IV Push once  furosemide   Injectable 40 milliGRAM(s) IV Push every 12 hours  glucagon  Injectable 1 milliGRAM(s) IntraMuscular once  insulin lispro (ADMELOG) corrective regimen sliding scale   SubCutaneous at bedtime  insulin lispro (ADMELOG) corrective regimen sliding scale   SubCutaneous three times a day before meals  metoprolol succinate ER 50 milliGRAM(s) Oral daily  simvastatin 40 milliGRAM(s) Oral at bedtime    MEDICATIONS  (PRN):  acetaminophen     Tablet .. 650 milliGRAM(s) Oral every 6 hours PRN Temp greater or equal to 38C (100.4F), Mild Pain (1 - 3), Moderate Pain (4 - 6)  dextrose Oral Gel 15 Gram(s) Oral once PRN Blood Glucose LESS THAN 70 milliGRAM(s)/deciliter        I&O's Summary    24 Feb 2024 07:01  -  25 Feb 2024 07:00  --------------------------------------------------------  IN: 880 mL / OUT: 1400 mL / NET: -520 mL        PHYSICAL EXAM:  Vital Signs Last 24 Hrs  T(C): 36.7 (25 Feb 2024 08:34), Max: 36.7 (25 Feb 2024 01:45)  T(F): 98 (25 Feb 2024 08:34), Max: 98.1 (25 Feb 2024 01:45)  HR: 72 (25 Feb 2024 08:34) (65 - 73)  BP: 123/64 (25 Feb 2024 08:34) (96/46 - 123/64)  BP(mean): --  RR: 18 (25 Feb 2024 08:34) (18 - 20)  SpO2: 95% (25 Feb 2024 08:34) (92% - 95%)    Parameters below as of 25 Feb 2024 08:00  Patient On (Oxygen Delivery Method): nasal cannula          Constitutional: Chronically ill appearing female on NC  ENT: Supple, No JVD  Lungs: Decreased Sounds b/l  Cardio: RRR, S1/S2,    Abdomen: Soft, Nontender, Nondistended; Bowel sounds present  Extremities: No calf tenderness, B/L LE edema with erythema   Musculoskeletal:   No joint swelling  Psych: Calm, cooperative affect appropriate  Neuro: Awake and alert, oriented x 4  Skin: No rashes; no palpable lesions    LABS:                        12.5   8.96  )-----------( 149      ( 25 Feb 2024 04:47 )             39.4     02-25    139  |  104  |  40.9<H>  ----------------------------<  99  4.2   |  21.0<L>  |  1.53<H>    Ca    8.3<L>      25 Feb 2024 04:47  Mg     2.4     02-24    TPro  6.1<L>  /  Alb  3.4  /  TBili  0.5  /  DBili  x   /  AST  12  /  ALT  7   /  AlkPhos  77  02-24          Urinalysis Basic - ( 25 Feb 2024 04:47 )    Color: x / Appearance: x / SG: x / pH: x  Gluc: 99 mg/dL / Ketone: x  / Bili: x / Urobili: x   Blood: x / Protein: x / Nitrite: x   Leuk Esterase: x / RBC: x / WBC x   Sq Epi: x / Non Sq Epi: x / Bacteria: x        Culture - Urine (collected 23 Feb 2024 06:00)  Source: Clean Catch Clean Catch (Midstream)  Final Report (24 Feb 2024 11:29):    No growth    Culture - Blood (collected 23 Feb 2024 02:40)  Source: .Blood Blood-Venous  Preliminary Report (25 Feb 2024 07:01):    No growth at 48 Hours    Culture - Blood (collected 23 Feb 2024 02:35)  Source: .Blood Blood-Venous  Preliminary Report (25 Feb 2024 07:01):    No growth at 48 Hours      CAPILLARY BLOOD GLUCOSE      POCT Blood Glucose.: 87 mg/dL (25 Feb 2024 08:07)  POCT Blood Glucose.: 119 mg/dL (24 Feb 2024 21:14)  POCT Blood Glucose.: 114 mg/dL (24 Feb 2024 17:01)  POCT Blood Glucose.: 123 mg/dL (24 Feb 2024 12:03)        RADIOLOGY REVIEWED

## 2024-02-26 NOTE — PHYSICAL THERAPY INITIAL EVALUATION ADULT - PERTINENT HX OF CURRENT PROBLEM, REHAB EVAL
83 year old female with Hypertension, Diabetes, COPD on intermittent 4LNC, CHF?rEF, CAD s/p CABG, MVR and TAVR presents with swelling of legs for past 2 months, gotten worse over the past 4 days now painful affecting ambulation Supplemental O2 for the past 2 months intermittently but has been using it consistently recently.  Admitted for acute heart failure exacerbation.

## 2024-02-26 NOTE — PHYSICAL THERAPY INITIAL EVALUATION ADULT - TRANSFER SKILLS, REHAB EVAL
Called patient for CCM monthly  Left detailed message to call back  Verified HIPPA  I will follow up with patient at a later time  Reviewed chart    Time spent this encounter: 4 mins    Total time spent this month: 4 mins independent

## 2024-02-26 NOTE — CONSULT NOTE ADULT - ASSESSMENT
Initial HF c/s: Trina Adan MD    82 y/o F with a h/o HFmrEF (LVEF now declined to 30-35% from 40-45% in 8/2023), CAD, s/p 5v CABG, AS s/p TAVR (2020), MVr, HTN, DM2 (A1c 6.0% 2/24/24), and COPD on home O2 (intermittent 4LNC), who presented with ADHF. She reports that she stopped taking her Lasix for about 9 days and then started developing increased SOB and LE edema.     Cardiac Studies:  2/23/24 TTE: LVIDd 4.2cm, LVEF 30-35%, grade I DD, mild LVH (septum 1.4cm, PWT 1.3cm), mod RVE with severe RV dysfunction, TAPSE 1.0cm, mild LAE, mod DUARTE, TAVR with normal function (MG 5 mmHg), trace paravalvular AI, MVr with mild MR, mild CT, severe PH (PASP 86 mmHg, RAP 15 mmHg), IVC dilated. Initial HF c/s: Trina Adan MD    84 y/o F with a h/o HFmrEF (LVEF now declined to 30-35% from 40-45% in 8/2023), CAD, s/p 5v CABG, AS s/p TAVR (2020), MVr, HTN, DM2 (A1c 6.0% 2/24/24), former smoker, and COPD on home O2 (intermittent 4LNC), who presented with ADHF. She reports that she stopped taking her Lasix for about 9 days and then started developing increased SOB and LE edema. She denies any recent HF hospitalizations.    Cardiac Studies:  2/23/24 TTE: LVIDd 4.2cm, LVEF 30-35%, grade I DD, mild LVH (septum 1.4cm, PWT 1.3cm), mod RVE with severe RV dysfunction, TAPSE 1.0cm, mild LAE, mod DUARTE, TAVR with normal function (MG 5 mmHg), trace paravalvular AI, MVr with mild MR, mild IL, severe PH (PASP 86 mmHg, RAP 15 mmHg), IVC dilated. Initial HF c/s: Trina Adan MD    82 y/o F with a h/o HFmrEF (LVEF now declined to 30-35% from 40-45% in 8/2023), CAD, s/p 5v CABG, AS s/p TAVR (2020), MVr, HTN, DM2 (A1c 6.0% 2/24/24), former smoker, and COPD on home O2 (intermittent 4LNC) and ?CKD 2-3, who presented with ADHF. She reports that she stopped taking her Lasix for about 9 days and then started developing increased SOB and LE edema. She denies any recent HF hospitalizations.    Cardiac Studies:  2/23/24 TTE: LVIDd 4.2cm, LVEF 30-35%, grade I DD, mild LVH (septum 1.4cm, PWT 1.3cm), mod RVE with severe RV dysfunction, TAPSE 1.0cm, mild LAE, mod DUARTE, TAVR with normal function (MG 5 mmHg), trace paravalvular AI, MVr with mild MR, mild MT, severe PH (PASP 86 mmHg, RAP 15 mmHg), IVC dilated.

## 2024-02-26 NOTE — CONSULT NOTE ADULT - SUBJECTIVE AND OBJECTIVE BOX
ADVANCED HEART FAILURE - CONSULT NOTE  [address] [place] [address]  Office Phone: [phone]/Fax: [phone]  Service/On Call Phone [phone]  _______________________________________________________________________________________________________    HPI:  82 y/o F with a h/o HFmrEF (LVEF now declined to 30-35% from 40-45% in 2023), CAD, s/p 5v CABG, AS s/p TAVR (), MVr, HTN, DM2 (A1c 6.0% 24), and COPD on home O2 (intermittent 4LNC), who presented with ADHF. She reports that she stopped taking her Lasix for about 9 days and then started developing increased SOB and LE edema.       PAST MEDICAL & SURGICAL HISTORY:  CAD (coronary artery disease)      Congestive heart failure (CHF)      HTN (hypertension)      Borderline diabetes      COPD without exacerbation      S/P MVR (mitral valve repair)      S/P TAVR (transcatheter aortic valve replacement)      S/P CABG (coronary artery bypass graft)        REVIEW OF SYSTEMS:  14 point ROS negative in detail apart from as documented in HPI.    MEDICATIONS  (STANDING):  albuterol/ipratropium for Nebulization 3 milliLiter(s) Nebulizer every 6 hours  aspirin enteric coated 81 milliGRAM(s) Oral daily  ceFAZolin  Injectable. 1000 milliGRAM(s) IV Push every 12 hours  dextrose 5%. 1000 milliLiter(s) (50 mL/Hr) IV Continuous <Continuous>  dextrose 5%. 1000 milliLiter(s) (100 mL/Hr) IV Continuous <Continuous>  dextrose 50% Injectable 12.5 Gram(s) IV Push once  dextrose 50% Injectable 25 Gram(s) IV Push once  dextrose 50% Injectable 25 Gram(s) IV Push once  furosemide   Injectable 40 milliGRAM(s) IV Push every 12 hours  glucagon  Injectable 1 milliGRAM(s) IntraMuscular once  heparin   Injectable 5000 Unit(s) SubCutaneous every 8 hours  insulin lispro (ADMELOG) corrective regimen sliding scale   SubCutaneous at bedtime  insulin lispro (ADMELOG) corrective regimen sliding scale   SubCutaneous three times a day before meals  metoprolol succinate ER 50 milliGRAM(s) Oral daily  simvastatin 40 milliGRAM(s) Oral at bedtime    MEDICATIONS  (PRN):  acetaminophen     Tablet .. 650 milliGRAM(s) Oral every 6 hours PRN Temp greater or equal to 38C (100.4F), Mild Pain (1 - 3), Moderate Pain (4 - 6)  dextrose Oral Gel 15 Gram(s) Oral once PRN Blood Glucose LESS THAN 70 milliGRAM(s)/deciliter    Home Medications:  aspirin 325 mg oral tablet: 1 tab(s) orally once a day (2024 15:34)  furosemide 40 mg oral tablet: 1 tab(s) orally once a day also take extra dose twice weekly (2024 15:34)  Jardiance 10 mg oral tablet: 1 tab(s) orally once a day (2024 15:34)  lisinopril 5 mg oral tablet: 1 tab(s) orally once a day (2024 15:34)  metoprolol succinate 50 mg oral tablet, extended release: 1 tab(s) orally once a day (2024 15:34)  simvastatin 40 mg oral tablet: 1 tab(s) orally once a day (at bedtime) (2024 15:34)    Allergies  No Known Allergies    Social History:  Lives with daughter and graddaughter  Quit smoking long ago; spouse had asbestosis  Denies any alcohol or drug use  Ambulate without walker or cane (2024 14:48)    FAMILY HISTORY:  FH: heart disease (Father, Mother)    ICU Vital Signs Last 24 Hrs  T(C): 36.9, Max: 36.9 ( @ 08:05)  HR: 79 (67 - 90)  BP: 100/60 (100/60 - 110/63)  BP(mean): --  ABP: --  ABP(mean): --  RR: 19 (19 - 21)  SpO2: 93% (90% - 95%)    Weight in k.6 (24)  Weight in k (24)      I&O's Summary Last 24 Hrs    IN: 240 mL / OUT: 800 mL / NET: -560 mL      Tele: SR 80s, PVCs    Physical Exam:    General: No distress. Comfortable.  Neck: JVP elevated to jawline  Respiratory: Clear to auscultation bilaterally  CV: RRR. Normal S1 and S2. No murmurs, rub, or gallops. Radial pulses normal.  Abdomen: Soft, non-distended, non-tender  Extremities: Warm, trace BLE edema with erythema  Neurology: Non-focal, alert  Psych: Affect normal    Labs:    ( 24 @ 05:22 )               11.9   8.25  )--------( 141                  37.6     ( 24 @ 05:22 )     139  |  104  |  44.6  ---------------------<  94  4.2  |  22.0  |  1.70    Ca 8.0  Phos x   Mg x     ( 24 @ 04:30 )  TPro  6.1  /  Alb  3.4  /  TBili  0.5  /  DBili  x   /  AST  12  /  ALT  7   /  AlkPhos  77    ( 24 @ 04:40 )  TropHS 54    / CK x     / CKMB x      ( 24 @ 02:40 )  TropHS 58    / CK x     / CKMB x       ADVANCED HEART FAILURE - CONSULT NOTE  [address] [place] [address]  Office Phone: [phone]/Fax: [phone]  Service/On Call Phone [phone]  _______________________________________________________________________________________________________    HPI:  84 y/o F with a h/o HFmrEF (LVEF now declined to 30-35% from 40-45% in 2023), CAD, s/p 5v CABG, AS s/p TAVR (), MVr, HTN, DM2 (A1c 6.0% 24), former smoker, and COPD on home O2 (intermittent 4LNC), who presented with ADHF. She reports that she stopped taking her Lasix for about 9 days and then started developing increased SOB and LE edema. She denies any recent HF hospitalizations.      PAST MEDICAL & SURGICAL HISTORY:  CAD (coronary artery disease)      Congestive heart failure (CHF)      HTN (hypertension)      Borderline diabetes      COPD without exacerbation      S/P MVR (mitral valve repair)      S/P TAVR (transcatheter aortic valve replacement)      S/P CABG (coronary artery bypass graft)        REVIEW OF SYSTEMS:  14 point ROS negative in detail apart from as documented in HPI.    MEDICATIONS  (STANDING):  albuterol/ipratropium for Nebulization 3 milliLiter(s) Nebulizer every 6 hours  aspirin enteric coated 81 milliGRAM(s) Oral daily  ceFAZolin  Injectable. 1000 milliGRAM(s) IV Push every 12 hours  dextrose 5%. 1000 milliLiter(s) (50 mL/Hr) IV Continuous <Continuous>  dextrose 5%. 1000 milliLiter(s) (100 mL/Hr) IV Continuous <Continuous>  dextrose 50% Injectable 12.5 Gram(s) IV Push once  dextrose 50% Injectable 25 Gram(s) IV Push once  dextrose 50% Injectable 25 Gram(s) IV Push once  furosemide   Injectable 40 milliGRAM(s) IV Push every 12 hours  glucagon  Injectable 1 milliGRAM(s) IntraMuscular once  heparin   Injectable 5000 Unit(s) SubCutaneous every 8 hours  insulin lispro (ADMELOG) corrective regimen sliding scale   SubCutaneous at bedtime  insulin lispro (ADMELOG) corrective regimen sliding scale   SubCutaneous three times a day before meals  metoprolol succinate ER 50 milliGRAM(s) Oral daily  simvastatin 40 milliGRAM(s) Oral at bedtime    MEDICATIONS  (PRN):  acetaminophen     Tablet .. 650 milliGRAM(s) Oral every 6 hours PRN Temp greater or equal to 38C (100.4F), Mild Pain (1 - 3), Moderate Pain (4 - 6)  dextrose Oral Gel 15 Gram(s) Oral once PRN Blood Glucose LESS THAN 70 milliGRAM(s)/deciliter    Home Medications:  aspirin 325 mg oral tablet: 1 tab(s) orally once a day (2024 15:34)  furosemide 40 mg oral tablet: 1 tab(s) orally once a day also take extra dose twice weekly (2024 15:34)  Jardiance 10 mg oral tablet: 1 tab(s) orally once a day (2024 15:34)  lisinopril 5 mg oral tablet: 1 tab(s) orally once a day (2024 15:34)  metoprolol succinate 50 mg oral tablet, extended release: 1 tab(s) orally once a day (2024 15:34)  simvastatin 40 mg oral tablet: 1 tab(s) orally once a day (at bedtime) (2024 15:34)    Allergies  No Known Allergies    Social History:  Lives with daughter and graddaughter  Quit smoking long ago; spouse had asbestosis  Denies any alcohol or drug use  Ambulate without walker or cane (2024 14:48)    FAMILY HISTORY:  FH: heart disease (Father, Mother)    ICU Vital Signs Last 24 Hrs  T(C): 36.9, Max: 36.9 ( @ 08:05)  HR: 79 (67 - 90)  BP: 100/60 (100/60 - 110/63)  BP(mean): --  ABP: --  ABP(mean): --  RR: 19 (19 - 21)  SpO2: 93% (90% - 95%)    Weight in k.6 (02-26-24)  Weight in k (24)      I&O's Summary Last 24 Hrs    IN: 240 mL / OUT: 800 mL / NET: -560 mL      Tele: SR 80s, PVCs    Physical Exam:    General: No distress. Comfortable.  Neck: JVP elevated to jawline  Respiratory: Clear to auscultation bilaterally  CV: RRR. Normal S1 and S2. No murmurs, rub, or gallops. Radial pulses normal.  Abdomen: Soft, non-distended, non-tender  Extremities: Warm, trace BLE edema with erythema  Neurology: Non-focal, alert  Psych: Affect normal    Labs:    ( 24 @ 05:22 )               11.9   8.25  )--------( 141                  37.6     ( 24 @ 05:22 )     139  |  104  |  44.6  ---------------------<  94  4.2  |  22.0  |  1.70    Ca 8.0  Phos x   Mg x     ( 24 @ 04:30 )  TPro  6.1  /  Alb  3.4  /  TBili  0.5  /  DBili  x   /  AST  12  /  ALT  7   /  AlkPhos  77    ( 24 @ 04:40 )  TropHS 54    / CK x     / CKMB x      ( 24 @ 02:40 )  TropHS 58    / CK x     / CKMB x

## 2024-02-26 NOTE — PROGRESS NOTE ADULT - SUBJECTIVE AND OBJECTIVE BOX
[facility], THE HEART CENTER                                   [address] [place] [address]                                                      PHONE: [phone]                                                         FAX: [phone]  [email or web address]  ---------------------------------------------------------------------------------------------------------------------------------    Overnight events/patient complaints:  Patient feeling well.  Breathing is improving but she is still short of breath at rest.     PAST MEDICAL & SURGICAL HISTORY:  CAD (coronary artery disease)      Congestive heart failure (CHF)      HTN (hypertension)      Borderline diabetes      COPD without exacerbation      S/P MVR (mitral valve repair)      S/P TAVR (transcatheter aortic valve replacement)      S/P CABG (coronary artery bypass graft)          No Known Allergies    MEDICATIONS  (STANDING):  albuterol/ipratropium for Nebulization 3 milliLiter(s) Nebulizer every 6 hours  aspirin enteric coated 81 milliGRAM(s) Oral daily  ceFAZolin  Injectable. 1000 milliGRAM(s) IV Push every 12 hours  dextrose 5%. 1000 milliLiter(s) (50 mL/Hr) IV Continuous <Continuous>  dextrose 5%. 1000 milliLiter(s) (100 mL/Hr) IV Continuous <Continuous>  dextrose 50% Injectable 12.5 Gram(s) IV Push once  dextrose 50% Injectable 25 Gram(s) IV Push once  dextrose 50% Injectable 25 Gram(s) IV Push once  furosemide   Injectable 40 milliGRAM(s) IV Push every 12 hours  glucagon  Injectable 1 milliGRAM(s) IntraMuscular once  heparin   Injectable 5000 Unit(s) SubCutaneous every 8 hours  insulin lispro (ADMELOG) corrective regimen sliding scale   SubCutaneous at bedtime  insulin lispro (ADMELOG) corrective regimen sliding scale   SubCutaneous three times a day before meals  metoprolol succinate ER 50 milliGRAM(s) Oral daily  simvastatin 40 milliGRAM(s) Oral at bedtime    MEDICATIONS  (PRN):  acetaminophen     Tablet .. 650 milliGRAM(s) Oral every 6 hours PRN Temp greater or equal to 38C (100.4F), Mild Pain (1 - 3), Moderate Pain (4 - 6)  dextrose Oral Gel 15 Gram(s) Oral once PRN Blood Glucose LESS THAN 70 milliGRAM(s)/deciliter      Vital Signs Last 24 Hrs  T(C): 36.9 (26 Feb 2024 08:05), Max: 36.9 (26 Feb 2024 08:05)  T(F): 98.4 (26 Feb 2024 08:05), Max: 98.4 (26 Feb 2024 08:05)  HR: 79 (26 Feb 2024 08:25) (67 - 90)  BP: 100/60 (26 Feb 2024 08:05) (100/60 - 110/63)  BP(mean): --  RR: 19 (26 Feb 2024 08:05) (19 - 21)  SpO2: 93% (26 Feb 2024 08:25) (90% - 95%)    Parameters below as of 26 Feb 2024 08:25  Patient On (Oxygen Delivery Method): nasal cannula      ICU Vital Signs Last 24 Hrs  BEBETO DURÁN  I&O's Detail    25 Feb 2024 07:01  -  26 Feb 2024 07:00  --------------------------------------------------------  IN:    Oral Fluid: 240 mL  Total IN: 240 mL    OUT:    Voided (mL): 800 mL  Total OUT: 800 mL    Total NET: -560 mL        I&O's Summary    25 Feb 2024 07:01  -  26 Feb 2024 07:00  --------------------------------------------------------  IN: 240 mL / OUT: 800 mL / NET: -560 mL      Drug Dosing Weight  BEBETO DURÁN      PHYSICAL EXAM:  General: Appears well developed, alert and cooperative.  HEENT: Head; normocephalic, atraumatic.  Eyes: Pupils reactive, cornea wnl.  Neck: Supple, no nodes adenopathy, + JVD, elevated JVP  CARDIOVASCULAR: Normal S1 and S2, No murmur, rub, gallop or lift.   LUNGS: Rales at lung bases  ABDOMEN: Soft, nontender, nondistended  EXTREMITIES: No clubbing, +1 edema  SKIN: warm and dry with normal turgor.  NEURO: Alert/oriented x 3/normal motor exam.   PSYCH: normal affect.        LABS:                        11.9   8.25  )-----------( 141      ( 26 Feb 2024 05:22 )             37.6     02-26    139  |  104  |  44.6<H>  ----------------------------<  94  4.2   |  22.0  |  1.70<H>    Ca    8.0<L>      26 Feb 2024 05:22      BEBETO DURÁN        Urinalysis Basic - ( 26 Feb 2024 05:22 )    Color: x / Appearance: x / SG: x / pH: x  Gluc: 94 mg/dL / Ketone: x  / Bili: x / Urobili: x   Blood: x / Protein: x / Nitrite: x   Leuk Esterase: x / RBC: x / WBC x   Sq Epi: x / Non Sq Epi: x / Bacteria: x        RADIOLOGY & ADDITIONAL STUDIES:    INTERPRETATION OF TELEMETRY (personally reviewed):    ECG:    ECHO:    STRESS TEST:    CARDIAC CATHETERIZATION:    ASSESSMENT AND PLAN:  In summary, BEBETO DURÁN is an 83y Female with past medical history significant for cad s/p cabg, severe AS s/p TAVR, MV annuloplasty, HFmrEF p/w acute decompensated heart failure.    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  Please feel free to call with any questions or concerns.

## 2024-02-26 NOTE — CONSULT NOTE ADULT - NS ATTEND AMEND GEN_ALL_CORE FT
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 She reports that she d/c’ed her Lasix for almost a week. She believes that this is the probable trigger of her exacerbation. Her admission labs were remarkable for creatinine 2.02,BUN 32.9, pBNP 5754 and Trop T HS 58-54. Her EKG was remarkable for frequent PVCs w/o acute ST-T changes.   She has been started on IV Lasix with significant improvement. She had a TTE which showed worsening LVEF to 30-35%, severe RV dysfunction and elevated filling pressures.    She reports she feels much better. Her PE is notable for +JVD, decreased BS at the bases and trace ankle edema.     Her cardiac testing includes:  2/23/24 TTE: LVIDd 4.2cm, LVEF 30-35%, grade I DD, mild LVH (septum 1.4cm, PWT 1.3cm), mod RVE with severe RV dysfunction, TAPSE 1.0cm, mild LAE, mod DUARTE, TAVR with normal function (MG 5 mmHg), trace paravalvular AI, MVr with mild MR, mild FL, severe PH (PASP 86 mmHg, RAP 15 mmHg), IVC dilated.    In summary, this is an 84 y/o female with h/o chronic systolic HF ACC/AHA stage C, ?ICMP LVEF now 30-35%, CAD s/p CABG, AS s/p TAVR ’20, MV repair, HTN, DM 2, former smoker, CKD 2-3 and COPD on home O2 (PRN more recently continuously) admitted w ADHF in setting of discontinuing her po Lasix at home. A TTE during admission shows worsening LVEF. She would benefit from diuresis, GDMT optimization and ?ischemic work up.   Recommendations:  - Strict I/O, daily standing weights  - Diuretics: continue Lasix 40mg IV BID  - GDMT: Toprol xl 50mg daily. Plan to add ARNi/MRA/SGLT2i. Please add  Aldactone 25mg daily starting tomorrow.   - Consider nephrology c/s for ?CKD  - We will follow intermittently  - Plan as above

## 2024-02-26 NOTE — PHYSICAL THERAPY INITIAL EVALUATION ADULT - ADDITIONAL COMMENTS
Pt reports living with daughter and granddaughter in a house with 5-6 + 5-6 HEAVEN c rail, and resides on the upper level. Pt amb without device and is independent with functional mobility, ADLs, and IADLs (however has assist if needed for ADLs, IADLs). Pt drives and is retired. Pt has support of family.

## 2024-02-26 NOTE — CONSULT NOTE ADULT - PROBLEM SELECTOR RECOMMENDATION 2
- Unknown baseline  - Monitor renal function closely with diuresis - Unknown baseline  - Monitor renal function closely with diuresis  - Consider nephrology c/s ?CKD

## 2024-02-26 NOTE — PHYSICAL THERAPY INITIAL EVALUATION ADULT - IMPAIRMENTS FOUND, PT EVAL
Monitoring H/H.  Insurance remains pending.    aerobic capacity/endurance/gait, locomotion, and balance/muscle strength

## 2024-02-26 NOTE — CONSULT NOTE ADULT - PROBLEM SELECTOR RECOMMENDATION 3
- Likely [facility]   - On intermittent home O2  - Should f/u with pulmonary as an outpatient  - c/w diuresis as above - Likely [facility] - based on RVSP.   - On intermittent home O2  - Should f/u with pulmonary as an outpatient  - c/w diuresis as above

## 2024-02-26 NOTE — CONSULT NOTE ADULT - PROBLEM SELECTOR RECOMMENDATION 9
- TTE this admission showed a decline in her LVEF to 30-35% (per chart notes, prior TTE 8/2023 showed LVEF 40-45%) and severe RV dysfunction.  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 Will defer ischemic w/u to [facility].  - Clinically hypervolemic w/ warm extremities.   - GDMT: Continue Toprol XL 50 mg daily. If renal function improves/remains stable will add ARB/ARNi/MRA/SGLT2i.  - Diuretics: Continue Lasix 40 mg IV BID which she seems to be responding to  - Please document strict I&Os  - Device: Will need GDMT optimization and then repeat TTE to see if she meets criteria for primary prevention ICD (if this is within her GOC given her age). - TTE this admission showed a decline in her LVEF to 30-35% (per chart notes, prior TTE 8/2023 showed LVEF 40-45%) and severe RV dysfunction.  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 Will defer ischemic w/u to [place] cardiology.  - Clinically hypervolemic w/ warm extremities.   - GDMT: Continue Toprol XL 50 mg daily. Please add aldactone 25mgdaily starting tomorrow. If renal function improves/remains stable will add ARB/ARNi/MRA/SGLT2i.  - Diuretics: Continue Lasix 40 mg IV BID which she seems to be responding to  - Please document strict I&Os  - Device: Will need GDMT optimization and then repeat TTE to see if she meets criteria for primary prevention ICD (if this is within her GOC given her age).

## 2024-02-26 NOTE — PROGRESS NOTE ADULT - ASSESSMENT
83 year old female with Hypertension, Diabetes, COPD on intermittent 4LNC, CHF?rEF, CAD s/p CABG, MVR and TAVR presents with swelling of legs for past 2 months, gotten worse over the past 4 days now painful affecting ambulation Supplemental O2 for the past 2 months intermittently but has been using it consistently recently.  Admitted for acute heart failure exacerbation.      #CHF reduced EF   Telemetry monitoring  I/O, daily weight, salt and fluid restriction.  Lasix 40 IV BID  Metoprolol  Hold Ace inhibitor given MARGARITA  TTE with EF of 30-35% and severe PH  Cardio recs appreciated  HF consult    #Cellulitis, Bilateral LE  Cefazolin 1gm q12  Blood cx with NGTD  Keep legs elevated  Improving    # MARGRAITA on CKD3b  Baseline SCr ~ 1.6  as per cardiology  I/O, monitor renal function  avoid Nephrotoxins, (ACEI on hold)  Monitor Cr with diuresis     # CAD  indeterminate Troponin in setting of renal failure  Monitor  ASA, Statin, BB  Cardio recs appreciated    # T2DM  Hold oral hypoglycemic agent  Blood glucose monitoring with sliding scale Lispro    # COPD  Mild hypercarbia  Patient stopped using Stiolto at home.  Supplemental O2, target SpO2 88-94  Albuterol/Ipratropium Nebulizer PRN  Follow up with Pulmonology as outpatient    VTE Prophylaxis: Heparin ordered  Intermittent Venous compression device bilaterally  Mobilize, increase as tolerated    Discussed with patient and daughter at bedside    Dispo: Pending course

## 2024-02-26 NOTE — PROGRESS NOTE ADULT - SUBJECTIVE AND OBJECTIVE BOX
Hospitalist Daily Progress Note    Chief Complaint:  Patient is a 83y old  Female who presents with a chief complaint of Legs full of water  Cellulitis  CHF (25 Feb 2024 10:10)      SUBJECTIVE / OVERNIGHT EVENTS:  Patient was seen and examined at bedside. Feels about the same.   Patient denies chest pain, abd pain, N/V, fever, chills, dysuria or any other complaints. All remainder ROS negative.     MEDICATIONS  (STANDING):  albuterol/ipratropium for Nebulization 3 milliLiter(s) Nebulizer every 6 hours  aspirin enteric coated 81 milliGRAM(s) Oral daily  ceFAZolin  Injectable. 1000 milliGRAM(s) IV Push every 12 hours  dextrose 5%. 1000 milliLiter(s) (50 mL/Hr) IV Continuous <Continuous>  dextrose 5%. 1000 milliLiter(s) (100 mL/Hr) IV Continuous <Continuous>  dextrose 50% Injectable 25 Gram(s) IV Push once  dextrose 50% Injectable 25 Gram(s) IV Push once  dextrose 50% Injectable 12.5 Gram(s) IV Push once  furosemide   Injectable 40 milliGRAM(s) IV Push every 12 hours  glucagon  Injectable 1 milliGRAM(s) IntraMuscular once  heparin   Injectable 5000 Unit(s) SubCutaneous every 8 hours  insulin lispro (ADMELOG) corrective regimen sliding scale   SubCutaneous at bedtime  insulin lispro (ADMELOG) corrective regimen sliding scale   SubCutaneous three times a day before meals  metoprolol succinate ER 50 milliGRAM(s) Oral daily  simvastatin 40 milliGRAM(s) Oral at bedtime    MEDICATIONS  (PRN):  acetaminophen     Tablet .. 650 milliGRAM(s) Oral every 6 hours PRN Temp greater or equal to 38C (100.4F), Mild Pain (1 - 3), Moderate Pain (4 - 6)  dextrose Oral Gel 15 Gram(s) Oral once PRN Blood Glucose LESS THAN 70 milliGRAM(s)/deciliter        I&O's Summary    25 Feb 2024 07:01  -  26 Feb 2024 07:00  --------------------------------------------------------  IN: 240 mL / OUT: 800 mL / NET: -560 mL        PHYSICAL EXAM:  Vital Signs Last 24 Hrs  T(C): 36.9 (26 Feb 2024 08:05), Max: 36.9 (26 Feb 2024 08:05)  T(F): 98.4 (26 Feb 2024 08:05), Max: 98.4 (26 Feb 2024 08:05)  HR: 79 (26 Feb 2024 08:25) (67 - 90)  BP: 100/60 (26 Feb 2024 08:05) (100/60 - 110/63)  BP(mean): --  RR: 19 (26 Feb 2024 08:05) (19 - 21)  SpO2: 93% (26 Feb 2024 08:25) (90% - 95%)    Parameters below as of 26 Feb 2024 08:25  Patient On (Oxygen Delivery Method): nasal cannula          Constitutional: NAD, Resting, NC  ENT: Supple, No JVD  Lungs: Decreased breath sounds b/l   Cardio: RRR, S1/S2,  Abdomen: Soft, Nontender, Nondistended; Bowel sounds present  Extremities: No calf tenderness, B/L LE Edema with erythema   Musculoskeletal:   No joint swelling  Psych: Calm, cooperative affect appropriate  Neuro: Awake and alert, oriented x 4  Skin: No rashes; no palpable lesions    LABS:                        11.9   8.25  )-----------( 141      ( 26 Feb 2024 05:22 )             37.6     02-26    139  |  104  |  44.6<H>  ----------------------------<  94  4.2   |  22.0  |  1.70<H>    Ca    8.0<L>      26 Feb 2024 05:22            Urinalysis Basic - ( 26 Feb 2024 05:22 )    Color: x / Appearance: x / SG: x / pH: x  Gluc: 94 mg/dL / Ketone: x  / Bili: x / Urobili: x   Blood: x / Protein: x / Nitrite: x   Leuk Esterase: x / RBC: x / WBC x   Sq Epi: x / Non Sq Epi: x / Bacteria: x        CAPILLARY BLOOD GLUCOSE      POCT Blood Glucose.: 87 mg/dL (26 Feb 2024 07:58)  POCT Blood Glucose.: 125 mg/dL (25 Feb 2024 22:58)  POCT Blood Glucose.: 96 mg/dL (25 Feb 2024 22:11)  POCT Blood Glucose.: 109 mg/dL (25 Feb 2024 17:11)  POCT Blood Glucose.: 96 mg/dL (25 Feb 2024 12:11)        RADIOLOGY REVIEWED

## 2024-02-27 NOTE — PROGRESS NOTE ADULT - SUBJECTIVE AND OBJECTIVE BOX
[facility], THE HEART CENTER                                   [address] [place] [address]                                                      PHONE: [phone]                                                         FAX: [phone]  [email or web address]  ---------------------------------------------------------------------------------------------------------------------------------    Overnight events/patient complaints:  Pt feeling well.  Breathing improving.     PAST MEDICAL & SURGICAL HISTORY:  CAD (coronary artery disease)      Congestive heart failure (CHF)      HTN (hypertension)      Borderline diabetes      COPD without exacerbation      S/P MVR (mitral valve repair)      S/P TAVR (transcatheter aortic valve replacement)      S/P CABG (coronary artery bypass graft)          No Known Allergies    MEDICATIONS  (STANDING):  albuterol/ipratropium for Nebulization 3 milliLiter(s) Nebulizer every 6 hours  aspirin enteric coated 81 milliGRAM(s) Oral daily  ceFAZolin  Injectable. 1000 milliGRAM(s) IV Push every 12 hours  dextrose 5%. 1000 milliLiter(s) (50 mL/Hr) IV Continuous <Continuous>  dextrose 5%. 1000 milliLiter(s) (100 mL/Hr) IV Continuous <Continuous>  dextrose 50% Injectable 12.5 Gram(s) IV Push once  dextrose 50% Injectable 25 Gram(s) IV Push once  dextrose 50% Injectable 25 Gram(s) IV Push once  furosemide   Injectable 40 milliGRAM(s) IV Push every 12 hours  glucagon  Injectable 1 milliGRAM(s) IntraMuscular once  heparin   Injectable 5000 Unit(s) SubCutaneous every 8 hours  insulin lispro (ADMELOG) corrective regimen sliding scale   SubCutaneous at bedtime  insulin lispro (ADMELOG) corrective regimen sliding scale   SubCutaneous three times a day before meals  metoprolol succinate ER 50 milliGRAM(s) Oral daily  simvastatin 40 milliGRAM(s) Oral at bedtime    MEDICATIONS  (PRN):  acetaminophen     Tablet .. 650 milliGRAM(s) Oral every 6 hours PRN Temp greater or equal to 38C (100.4F), Mild Pain (1 - 3), Moderate Pain (4 - 6)  dextrose Oral Gel 15 Gram(s) Oral once PRN Blood Glucose LESS THAN 70 milliGRAM(s)/deciliter      Vital Signs Last 24 Hrs  T(C): 36.8 (27 Feb 2024 08:07), Max: 37 (27 Feb 2024 01:08)  T(F): 98.3 (27 Feb 2024 08:07), Max: 98.6 (27 Feb 2024 01:08)  HR: 83 (27 Feb 2024 08:34) (68 - 95)  BP: 137/66 (27 Feb 2024 08:07) (98/64 - 137/66)  BP(mean): --  RR: 20 (27 Feb 2024 08:07) (16 - 20)  SpO2: 95% (27 Feb 2024 08:34) (91% - 96%)    Parameters below as of 27 Feb 2024 08:34  Patient On (Oxygen Delivery Method): nasal cannula, 4L      ICU Vital Signs Last 24 Hrs  BEBETO DURÁN  I&O's Detail    26 Feb 2024 07:01  -  27 Feb 2024 07:00  --------------------------------------------------------  IN:    Oral Fluid: 750 mL  Total IN: 750 mL    OUT:    Voided (mL): 1200 mL  Total OUT: 1200 mL    Total NET: -450 mL        I&O's Summary    26 Feb 2024 07:01  -  27 Feb 2024 07:00  --------------------------------------------------------  IN: 750 mL / OUT: 1200 mL / NET: -450 mL      Drug Dosing Weight  BEBETO DURÁN      PHYSICAL EXAM:  General: Appears well developed, alert and cooperative.  HEENT: Head; normocephalic, atraumatic.  Eyes: Pupils reactive, cornea wnl.  Neck: Supple, no nodes adenopathy, + JVD, elevated JVP  CARDIOVASCULAR: Normal S1 and S2, No murmur, rub, gallop or lift.   LUNGS: wheezing  ABDOMEN: Soft, nontender, nondistended  EXTREMITIES: No clubbing, +1 edema  SKIN: warm and dry with normal turgor.  NEURO: Alert/oriented x 3/normal motor exam.   PSYCH: normal affect.        LABS:                        11.9   7.36  )-----------( 148      ( 27 Feb 2024 04:54 )             35.9     02-27    140  |  102  |  44.2<H>  ----------------------------<  97  4.5   |  23.0  |  1.76<H>    Ca    8.3<L>      27 Feb 2024 04:54  Phos  3.8     02-27  Mg     2.4     02-27      BEBETO DURÁN        Urinalysis Basic - ( 27 Feb 2024 04:54 )    Color: x / Appearance: x / SG: x / pH: x  Gluc: 97 mg/dL / Ketone: x  / Bili: x / Urobili: x   Blood: x / Protein: x / Nitrite: x   Leuk Esterase: x / RBC: x / WBC x   Sq Epi: x / Non Sq Epi: x / Bacteria: x        RADIOLOGY & ADDITIONAL STUDIES:    INTERPRETATION OF TELEMETRY (personally reviewed):    ECG:    ECHO:    STRESS TEST:    CARDIAC CATHETERIZATION:    ASSESSMENT AND PLAN:  In summary, BEBETO DURÁN is an 83y Female with past medical history significant for cad s/p cabg, severe AS s/p TAVR, MV annuloplasty, HFmrEF p/w acute decompensated heart failure.    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 to participate in the care of this patient.  Please feel free to call with any questions or concerns.

## 2024-02-27 NOTE — PROGRESS NOTE ADULT - PROBLEM SELECTOR PLAN 3
- Likely [facility] - based on RVSP.   - On intermittent home O2  - Should f/u with pulmonary as an outpatient  - c/w diuresis as above. - based on RVSP. Would reassess once euvolemic. If RVSP remains elevated will consider RHC.   - On intermittent home O2  - Should f/u with pulmonary as an outpatient  - c/w diuresis as above.

## 2024-02-27 NOTE — PROGRESS NOTE ADULT - ASSESSMENT
83 year old female with Hypertension, Diabetes, COPD on intermittent 4LNC, CHF?rEF, CAD s/p CABG, MVR and TAVR presents with swelling of legs for past 2 months, gotten worse over the past 4 days now painful affecting ambulation Supplemental O2 for the past 2 months intermittently but has been using it consistently recently.  Admitted for acute heart failure exacerbation.      #CHF reduced EF   #Chronic respiratory failure  Telemetry monitoring  I/O, daily weight, salt and fluid restriction.  Increased lasix to 80 IV BID  Added Aldactone  Metoprolol  Hold Ace inhibitor given MARGARITA  TTE with EF of 30-35% and severe PH  Cardio recs appreciated  HF consult appreciated    #Cellulitis, Bilateral LE  Cefazolin 1gm q12  Blood cx with NGTD  Keep legs elevated  Improving    # MARGARITA on CKD3b  Baseline SCr ~ 1.6  as per cardiology  I/O, monitor renal function  avoid Nephrotoxins, (ACEI on hold)  Monitor Cr with diuresis     # CAD  indeterminate Troponin in setting of renal failure  Monitor  ASA, Statin, BB  Cardio recs appreciated    # T2DM  Hold oral hypoglycemic agent  Blood glucose monitoring with sliding scale Lispro    # COPD  Mild hypercarbia  Patient stopped using Stiolto at home.  Supplemental O2, target SpO2 88-94  Albuterol/Ipratropium Nebulizer PRN  Follow up with Pulmonology as outpatient    VTE Prophylaxis: Heparin ordered  Intermittent Venous compression device bilaterally  Mobilize, increase as tolerated    Discussed with patient and daughter at bedside    Dispo: Pending course, remains acute

## 2024-02-27 NOTE — PROGRESS NOTE ADULT - ASSESSMENT
Initial HF c/s: Trina Adan MD    84 y/o F with a h/o HFmrEF (LVEF now declined to 30-35% from 40-45% in 8/2023), CAD, s/p 5v CABG, AS s/p TAVR (2020), MVr, HTN, DM2 (A1c 6.0% 2/24/24), former smoker, and COPD on home O2 (intermittent 4LNC) and ?CKD 2-3, who presented with ADHF. She reports that she stopped taking her Lasix for about 9 days and then started developing increased SOB and LE edema. She denies any recent HF hospitalizations.    Cardiac Studies:  2/23/24 TTE: LVIDd 4.2cm, LVEF 30-35%, grade I DD, mild LVH (septum 1.4cm, PWT 1.3cm), mod RVE with severe RV dysfunction, TAPSE 1.0cm, mild LAE, mod DUARTE, TAVR with normal function (MG 5 mmHg), trace paravalvular AI, MVr with mild MR, mild DC, severe PH (PASP 86 mmHg, RAP 15 mmHg), IVC dilated.

## 2024-02-27 NOTE — PROGRESS NOTE ADULT - SUBJECTIVE AND OBJECTIVE BOX
ADVANCED HEART FAILURE - PROGRESS NOTE  [address] [place] [address]  Office Phone: [phone]/Fax: [phone]  Service/On Call Phone [phone]  _______________________________________________________________________________________________________    Subjective:  - NAEO  - Resting comfortably in bed with no complaints    Medications:  acetaminophen     Tablet .. 650 milliGRAM(s) Oral every 6 hours PRN  albuterol/ipratropium for Nebulization 3 milliLiter(s) Nebulizer every 6 hours  aspirin enteric coated 81 milliGRAM(s) Oral daily  ceFAZolin  Injectable. 1000 milliGRAM(s) IV Push every 12 hours  dextrose 5%. 1000 milliLiter(s) IV Continuous <Continuous>  dextrose 5%. 1000 milliLiter(s) IV Continuous <Continuous>  dextrose 50% Injectable 25 Gram(s) IV Push once  dextrose 50% Injectable 12.5 Gram(s) IV Push once  dextrose 50% Injectable 25 Gram(s) IV Push once  dextrose Oral Gel 15 Gram(s) Oral once PRN  furosemide   Injectable 80 milliGRAM(s) IV Push every 12 hours  furosemide   Injectable 40 milliGRAM(s) IV Push once  glucagon  Injectable 1 milliGRAM(s) IntraMuscular once  heparin   Injectable 5000 Unit(s) SubCutaneous every 8 hours  insulin lispro (ADMELOG) corrective regimen sliding scale   SubCutaneous at bedtime  insulin lispro (ADMELOG) corrective regimen sliding scale   SubCutaneous three times a day before meals  metoprolol succinate ER 50 milliGRAM(s) Oral daily  simvastatin 40 milliGRAM(s) Oral at bedtime  spironolactone 25 milliGRAM(s) Oral daily      ICU Vital Signs Last 24 Hrs  T(C): 36.8, Max: 37 (02-27 @ 01:08)  HR: 83 (72 - 95)  BP: 137/66 (98/64 - 137/66)  BP(mean): --  ABP: --  ABP(mean): --  RR: 20 (16 - 20)  SpO2: 95% (91% - 96%)    Weight in k.6 (24)  Weight in k.6 (24)      I&O's Summary Last 24 Hrs    IN: 750 mL / OUT: 1200 mL / NET: -450 mL      Tele: SR 70-80s, PVCs, PACs    Physical Exam:    General: No distress. Comfortable.  Neck: JVP elevated to jawline  Respiratory: Clear to auscultation bilaterally  CV: RRR. Normal S1 and S2. No murmurs, rub, or gallops. Radial pulses normal.  Abdomen: Soft, non-distended, non-tender  Extremities: Warm, trace BLE edema with erythema  Neurology: Non-focal, alert  Psych: Affect normal    Labs:    ( 24 @ 04:54 )               11.9   7.36  )--------( 148                  35.9     ( 24 @ 04:54 )     140  |  102  |  44.2  ---------------------<  97  4.5  |  23.0  |  1.76    Ca 8.3  Phos 3.8  Mg 2.4    ( 24 @ 04:40 )  TropHS 54    / CK x     / CKMB x      ( 24 @ 02:40 )  TropHS 58    / CK x     / CKMB x

## 2024-02-27 NOTE — PROGRESS NOTE ADULT - SUBJECTIVE AND OBJECTIVE BOX
Hospitalist Daily Progress Note    Chief Complaint:  Patient is a 83y old  Female who presents with a chief complaint of Legs full of water  Cellulitis  CHF (27 Feb 2024 12:26)      SUBJECTIVE / OVERNIGHT EVENTS:  Patient was seen and examined at bedside. Feels slightly better.   Patient denies chest pain, SOB, abd pain, N/V, fever, chills, dysuria or any other complaints. All remainder ROS negative.     MEDICATIONS  (STANDING):  albuterol/ipratropium for Nebulization 3 milliLiter(s) Nebulizer every 6 hours  aspirin enteric coated 81 milliGRAM(s) Oral daily  ceFAZolin  Injectable. 1000 milliGRAM(s) IV Push every 12 hours  dextrose 5%. 1000 milliLiter(s) (50 mL/Hr) IV Continuous <Continuous>  dextrose 5%. 1000 milliLiter(s) (100 mL/Hr) IV Continuous <Continuous>  dextrose 50% Injectable 25 Gram(s) IV Push once  dextrose 50% Injectable 12.5 Gram(s) IV Push once  dextrose 50% Injectable 25 Gram(s) IV Push once  furosemide   Injectable 80 milliGRAM(s) IV Push every 12 hours  glucagon  Injectable 1 milliGRAM(s) IntraMuscular once  heparin   Injectable 5000 Unit(s) SubCutaneous every 8 hours  insulin lispro (ADMELOG) corrective regimen sliding scale   SubCutaneous at bedtime  insulin lispro (ADMELOG) corrective regimen sliding scale   SubCutaneous three times a day before meals  metoprolol succinate ER 50 milliGRAM(s) Oral daily  simvastatin 40 milliGRAM(s) Oral at bedtime  spironolactone 25 milliGRAM(s) Oral daily    MEDICATIONS  (PRN):  acetaminophen     Tablet .. 650 milliGRAM(s) Oral every 6 hours PRN Temp greater or equal to 38C (100.4F), Mild Pain (1 - 3), Moderate Pain (4 - 6)  dextrose Oral Gel 15 Gram(s) Oral once PRN Blood Glucose LESS THAN 70 milliGRAM(s)/deciliter        I&O's Summary    26 Feb 2024 07:01  -  27 Feb 2024 07:00  --------------------------------------------------------  IN: 750 mL / OUT: 1200 mL / NET: -450 mL        PHYSICAL EXAM:  Vital Signs Last 24 Hrs  T(C): 36.7 (27 Feb 2024 13:40), Max: 37 (27 Feb 2024 01:08)  T(F): 98.1 (27 Feb 2024 13:40), Max: 98.6 (27 Feb 2024 01:08)  HR: 85 (27 Feb 2024 13:40) (72 - 95)  BP: 116/56 (27 Feb 2024 13:40) (98/64 - 137/66)  BP(mean): 76 (27 Feb 2024 13:40) (76 - 76)  RR: 20 (27 Feb 2024 13:40) (16 - 20)  SpO2: 92% (27 Feb 2024 13:40) (91% - 96%)    Parameters below as of 27 Feb 2024 13:40  Patient On (Oxygen Delivery Method): nasal cannula  O2 Flow (L/min): 5    Constitutional: NAD, Resting, NC  ENT: Supple, No JVD  Lungs: Decreased breath sounds b/l   Cardio: RRR, S1/S2,  Abdomen: Soft, Nontender, Nondistended; Bowel sounds present  Extremities: No calf tenderness, B/L LE Edema with erythema   Musculoskeletal:   No joint swelling  Psych: Calm, cooperative affect appropriate  Neuro: Awake and alert, oriented x 4  Skin: No rashes; no palpable lesions     LABS:                        11.9   7.36  )-----------( 148      ( 27 Feb 2024 04:54 )             35.9     02-27    140  |  102  |  44.2<H>  ----------------------------<  97  4.5   |  23.0  |  1.76<H>    Ca    8.3<L>      27 Feb 2024 04:54  Phos  3.8     02-27  Mg     2.4     02-27            Urinalysis Basic - ( 27 Feb 2024 04:54 )    Color: x / Appearance: x / SG: x / pH: x  Gluc: 97 mg/dL / Ketone: x  / Bili: x / Urobili: x   Blood: x / Protein: x / Nitrite: x   Leuk Esterase: x / RBC: x / WBC x   Sq Epi: x / Non Sq Epi: x / Bacteria: x        CAPILLARY BLOOD GLUCOSE      POCT Blood Glucose.: 101 mg/dL (27 Feb 2024 12:11)  POCT Blood Glucose.: 93 mg/dL (27 Feb 2024 08:30)  POCT Blood Glucose.: 98 mg/dL (26 Feb 2024 21:09)  POCT Blood Glucose.: 100 mg/dL (26 Feb 2024 17:12)        RADIOLOGY REVIEWED

## 2024-02-28 NOTE — PROGRESS NOTE ADULT - ASSESSMENT
BEBETO DURÁN is an 83y Female with past medical history significant for cad s/p cabg, severe AS s/p TAVR, MV annuloplasty, HFmrEF p/w acute decompensated heart failure.    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

## 2024-02-28 NOTE — PROGRESS NOTE ADULT - SUBJECTIVE AND OBJECTIVE BOX
[facility], [facility]                                   [address] [place] [address]                                                      PHONE: [phone]                                                         FAX: [phone]  [email or web address]  ---------------------------------------------------------------------------------------------------------------------------------    Overnight events/patient complaints:     No CP or SOB overnight   patient presently in the     PAST MEDICAL & SURGICAL HISTORY:  CAD (coronary artery disease)      Congestive heart failure (CHF)      HTN (hypertension)      Borderline diabetes      COPD without exacerbation      S/P MVR (mitral valve repair)      S/P TAVR (transcatheter aortic valve replacement)      S/P CABG (coronary artery bypass graft)        No Known Allergies    MEDICATIONS  (STANDING):  albuterol/ipratropium for Nebulization 3 milliLiter(s) Nebulizer every 6 hours  aspirin enteric coated 81 milliGRAM(s) Oral daily  ceFAZolin  Injectable. 1000 milliGRAM(s) IV Push every 12 hours  dextrose 5%. 1000 milliLiter(s) (50 mL/Hr) IV Continuous <Continuous>  dextrose 5%. 1000 milliLiter(s) (100 mL/Hr) IV Continuous <Continuous>  dextrose 50% Injectable 12.5 Gram(s) IV Push once  dextrose 50% Injectable 25 Gram(s) IV Push once  dextrose 50% Injectable 25 Gram(s) IV Push once  furosemide   Injectable 80 milliGRAM(s) IV Push every 12 hours  glucagon  Injectable 1 milliGRAM(s) IntraMuscular once  heparin   Injectable 5000 Unit(s) SubCutaneous every 8 hours  insulin lispro (ADMELOG) corrective regimen sliding scale   SubCutaneous three times a day before meals  insulin lispro (ADMELOG) corrective regimen sliding scale   SubCutaneous at bedtime  metoprolol succinate ER 50 milliGRAM(s) Oral daily  simvastatin 40 milliGRAM(s) Oral at bedtime  spironolactone 25 milliGRAM(s) Oral daily    MEDICATIONS  (PRN):  acetaminophen     Tablet .. 650 milliGRAM(s) Oral every 6 hours PRN Temp greater or equal to 38C (100.4F), Mild Pain (1 - 3), Moderate Pain (4 - 6)  dextrose Oral Gel 15 Gram(s) Oral once PRN Blood Glucose LESS THAN 70 milliGRAM(s)/deciliter      Vital Signs Last 24 Hrs  T(C): 36.8 (28 Feb 2024 08:48), Max: 36.9 (27 Feb 2024 21:10)  T(F): 98.2 (28 Feb 2024 08:48), Max: 98.5 (27 Feb 2024 21:10)  HR: 84 (28 Feb 2024 08:58) (83 - 96)  BP: 103/52 (28 Feb 2024 08:48) (97/59 - 116/56)  BP(mean): 79 (27 Feb 2024 21:10) (72 - 79)  RR: 18 (28 Feb 2024 08:48) (18 - 20)  SpO2: 92% (28 Feb 2024 08:58) (92% - 97%)    Parameters below as of 28 Feb 2024 08:58  Patient On (Oxygen Delivery Method): nasal cannula, 4L      ICU Vital Signs Last 24 Hrs  BEBETO DURÁN  I&O's Detail    27 Feb 2024 07:01  -  28 Feb 2024 07:00  --------------------------------------------------------  IN:    Oral Fluid: 240 mL  Total IN: 240 mL    OUT:    Voided (mL): 2500 mL  Total OUT: 2500 mL    Total NET: -2260 mL        I&O's Summary    27 Feb 2024 07:01  -  28 Feb 2024 07:00  --------------------------------------------------------  IN: 240 mL / OUT: 2500 mL / NET: -2260 mL      Drug Dosing Weight  BEBETO DURÁN    REVIEW OF SYSTEMS:    Constitutional: No fever, weight loss or fatigue  Eyes: No eye pain, visual disturbances, or discharge  ENMT:  No difficulty hearing, tinnitus, vertigo; No sinus or throat pain  Neck: No pain or stiffness  Respiratory: No cough, wheezing, chills or hemoptysis  Cardiovascular: No chest pain, palpitations, shortness of breath, dizziness or leg swelling  Gastrointestinal: No abdominal or epigastric pain. No nausea, vomiting or hematemesis; No diarrhea or constipation. No melena or hematochezia.  Genitourinary: No dysuria, frequency, hematuria or incontinence  Rectal: No pain, hemorrhoids or incontinence  Neurological: No headaches, memory loss, loss of strength, numbness or tremors  Skin: No itching, burning, rashes or lesions   Lymph Nodes: No enlarged glands  Endocrine: No heat or cold intolerance; No hair loss  Musculoskeletal: No joint pain or swelling; No muscle, back or extremity pain  Psychiatric: No depression, anxiety, mood swings or difficulty sleeping  Heme/Lymph: No easy bruising or bleeding gums  Allergy and Immunologic: No hives or eczema    PHYSICAL EXAM:  General: Appears alert and cooperative.  HEENT: Head; normocephalic, atraumatic.  Eyes: Pupils reactive, cornea wnl.  Neck: Supple, no nodes adenopathy, no NVD or carotid bruit or thyromegaly.  CARDIOVASCULAR: Normal S1 and S2, No murmur, rub, gallop or lift.   LUNGS: No rales, rhonchi or wheeze. Normal breath sounds bilaterally.  ABDOMEN: Soft, nontender without mass or organomegaly. bowel sounds normoactive.  EXTREMITIES: No clubbing, cyanosis or edema. Distal pulses wnl.   SKIN: warm and dry with normal turgor.  NEURO: Alert/oriented x 3/normal motor exam. No pathologic reflexes.    PSYCH: normal affect.        LABS:                        12.5   7.80  )-----------( 139      ( 28 Feb 2024 06:33 )             39.3     02-28    140  |  102  |  40.7<H>  ----------------------------<  90  4.5   |  24.0  |  1.40<H>    Ca    8.5      28 Feb 2024 06:33  Phos  3.8     02-27  Mg     2.4     02-27      BEBETO DURÁN        Urinalysis Basic - ( 28 Feb 2024 06:33 )    Color: x / Appearance: x / SG: x / pH: x  Gluc: 90 mg/dL / Ketone: x  / Bili: x / Urobili: x   Blood: x / Protein: x / Nitrite: x   Leuk Esterase: x / RBC: x / WBC x   Sq Epi: x / Non Sq Epi: x / Bacteria: x        RADIOLOGY & ADDITIONAL STUDIES:    INTERPRETATION OF TELEMETRY (personally reviewed):      ECHO:     < from: TTE W or WO Ultrasound Enhancing Agent (02.23.24 @ 11:03) >  Left Ventricle:  The left ventricular cavity is normal in size. Left ventricular wall thickness is mildly increased. The interventricular septum is flattened in systole and diastole consistent with right ventricular pressure and volume overload. Left ventricular systolic function is moderately to severely decreased with a calculated ejection fraction of 31 % by the Jamil's biplane method of disks with an ejection fraction visually estimated at 30 to 35%. There is mild (grade 1) left ventricular diastolic dysfunction. Mild left ventricular hypertrophy.     Right Ventricle:  The right ventricular cavity is moderately enlarged in size and severely reduced systolic function. Tricuspid annular plane systolic excursion (TAPSE) is 1.0 cm (normal >=1.7 cm).     Left Atrium:  The left atrium is mildly dilated with an indexed volume of 34.37 ml/m². The pulmonary venous systolic velocity is blunted consistent with elevated left atrial pressure.     Right Atrium:  The right atrium is moderately dilated with an indexed volume of 36.86 ml/m².     Interatrial Septum:  The interatrial septum appears intact.     Aortic Valve:  ( a transcatheter deployed (TAVR).     Mitral Valve:  Patient had MV annuloplasty. There is mitral valve thickening of the anterior and posterior leaflets.     Tricuspid Valve:  Structurally normal tricuspid valve with normal leaflet excursion. There is moderate tricuspid regurgitation. Estimated pulmonary artery systolic pressure is 86 mmHg, consistent with severe pulmonary hypertension.     Pulmonic Valve:  Structurally normal pulmonic valve with normal leaflet excursion. There is mild pulmonic regurgitation.     Pulmonary Artery:  The main pulmonary artery is normal in size, origin, and position.     Pericardium:  No pericardial effusion seen.     Systemic Veins:  The inferior vena cava is dilated measuring 2.37 cm in diameter, (dilated >2.1cm) with abnormal inspiratory collapse (abnormal <50%) consistent with elevated right atrial pressure (~15, range 10-20mmHg).  ____________________________________________________________________  QUANTITATIVE DATA:  Left Ventricle Measurements: (Indexed to BSA)     IVSd (2D):   1.4 cm  LVPWd (2D):  1.3 cm  LVIDd (2D):  4.2 cm  LVIDs (2D):  3.2 cm  LV Mass:     215 g  123.9 g/m²  LV Vol d, MOD A2C: 103.0 ml  59.51 ml/m²  LV Vol d, MOD A4C: 79.4 ml   45.87 ml/m²  LV Vol d, MOD BP:  93.1 ml   53.80 ml/m²  LV Vol s, MOD A2C: 74.2 ml   42.87 ml/m²  LV Vol s, MOD A4C: 54.7 ml   31.60 ml/m²  LV Vol s, MOD BP:  64.3 ml   37.15 ml/m²  LVOT SV MOD BP:    28.8 ml  LV EF% MOD BP:     31 %  Visualized LV EF%: 30 to 35%     MV E Vmax:    1.40 m/s  MV A Vmax:    1.05 m/s  MV E/A:       1.33  E/e' lateral: 18.13       Left Atrium Measurements: (Indexed to BSA)  LA Diam 2D:        4.20 cm  LA Vol s, MODA4C: 37.40 ml.  LA Vol s, MOD A2C: 79.90 ml.  LA Vol s, MOD BP:  59.50 ml  34.37 ml/m²    Right Ventricle Measurements: Right Atrial Measurements:     TAPSE:            1.0 cm      RA Vol:       63.80 ml  RV Base (RVID1):  4.5 cm      RA Vol Index: 36.86 ml/m²  RV Mid (RVID2):   4.3 cm  RV Major (RVID3): 6.6 cm       LVOT / RVOT/ Qp/Qs Data: (Indexed to BSA)  LVOT Vmax: 0.84 m/s  LVOT VTI:  15.79 cm    Aortic Valve Measurements:  AV Vmax:                1.6 m/s  AV Peak Gradient:       10.7 mmHg  AV Mean Gradient:       4.8 mmHg  AV VTI:                 35.7 cm  AV VTI Ratio:           0.44  AoV Dimensionless Index 0.44    Mitral Valve Measurements:     MV VTI (tips): 45.86 cm  MV Mean Grad:  2.4 mmHg  MV E Vmax:     1.4 m/s  MV A Vmax:     1.0 m/s  MV E/A:        1.3       Tricuspid Valve Measurements:     TR Vmax:          4.2 m/s  TR Peak Gradient: 70.9 mmHg  RA Pressure:      15 mmHg  PASP:             86 mmHg       ________________________________________________________________________________________  Electronically signed on 2/23/2024 at 1:49:33 PM by Christian Turner DO           < end of copied text >  STRESS TEST:    CARDIAC CATHETERIZATION:    ACTIVE PROBLEMS:  HEALTH ISSUES - PROBLEM Dx:  Acute decompensated heart failure    MARGARITA (acute kidney injury)    Pulmonary hypertension                           [facility]                                   [address] [place] [address]                                                      PHONE: [phone]                                                         FAX: [phone]  [email or web address]  ---------------------------------------------------------------------------------------------------------------------------------    Overnight events/patient complaints:     No CP or SOB overnight   patient presently in the 4t still on IV diuresis seen at bedside with daughter     PAST MEDICAL & SURGICAL HISTORY:  CAD (coronary artery disease)      Congestive heart failure (CHF)      HTN (hypertension)      Borderline diabetes      COPD without exacerbation      S/P MVR (mitral valve repair)      S/P TAVR (transcatheter aortic valve replacement)      S/P CABG (coronary artery bypass graft)        No Known Allergies    MEDICATIONS  (STANDING):  albuterol/ipratropium for Nebulization 3 milliLiter(s) Nebulizer every 6 hours  aspirin enteric coated 81 milliGRAM(s) Oral daily  ceFAZolin  Injectable. 1000 milliGRAM(s) IV Push every 12 hours  dextrose 5%. 1000 milliLiter(s) (50 mL/Hr) IV Continuous <Continuous>  dextrose 5%. 1000 milliLiter(s) (100 mL/Hr) IV Continuous <Continuous>  dextrose 50% Injectable 12.5 Gram(s) IV Push once  dextrose 50% Injectable 25 Gram(s) IV Push once  dextrose 50% Injectable 25 Gram(s) IV Push once  furosemide   Injectable 80 milliGRAM(s) IV Push every 12 hours  glucagon  Injectable 1 milliGRAM(s) IntraMuscular once  heparin   Injectable 5000 Unit(s) SubCutaneous every 8 hours  insulin lispro (ADMELOG) corrective regimen sliding scale   SubCutaneous three times a day before meals  insulin lispro (ADMELOG) corrective regimen sliding scale   SubCutaneous at bedtime  metoprolol succinate ER 50 milliGRAM(s) Oral daily  simvastatin 40 milliGRAM(s) Oral at bedtime  spironolactone 25 milliGRAM(s) Oral daily    MEDICATIONS  (PRN):  acetaminophen     Tablet .. 650 milliGRAM(s) Oral every 6 hours PRN Temp greater or equal to 38C (100.4F), Mild Pain (1 - 3), Moderate Pain (4 - 6)  dextrose Oral Gel 15 Gram(s) Oral once PRN Blood Glucose LESS THAN 70 milliGRAM(s)/deciliter      Vital Signs Last 24 Hrs  T(C): 36.8 (28 Feb 2024 08:48), Max: 36.9 (27 Feb 2024 21:10)  T(F): 98.2 (28 Feb 2024 08:48), Max: 98.5 (27 Feb 2024 21:10)  HR: 84 (28 Feb 2024 08:58) (83 - 96)  BP: 103/52 (28 Feb 2024 08:48) (97/59 - 116/56)  BP(mean): 79 (27 Feb 2024 21:10) (72 - 79)  RR: 18 (28 Feb 2024 08:48) (18 - 20)  SpO2: 92% (28 Feb 2024 08:58) (92% - 97%)    Parameters below as of 28 Feb 2024 08:58  Patient On (Oxygen Delivery Method): nasal cannula, 4L      ICU Vital Signs Last 24 Hrs  BEBETO DURÁN  I&O's Detail    27 Feb 2024 07:01  -  28 Feb 2024 07:00  --------------------------------------------------------  IN:    Oral Fluid: 240 mL  Total IN: 240 mL    OUT:    Voided (mL): 2500 mL  Total OUT: 2500 mL    Total NET: -2260 mL        I&O's Summary    27 Feb 2024 07:01  -  28 Feb 2024 07:00  --------------------------------------------------------  IN: 240 mL / OUT: 2500 mL / NET: -2260 mL      Drug Dosing Weight  BEBETO DURÁN    REVIEW OF SYSTEMS:    Constitutional: No fever, weight loss or fatigue  Eyes: No eye pain, visual disturbances, or discharge  ENMT:  No difficulty hearing, tinnitus, vertigo; No sinus or throat pain  Neck: No pain or stiffness  Respiratory: No cough, wheezing, chills or hemoptysis  Cardiovascular: No chest pain, palpitations, shortness of breath, dizziness or leg swelling  Gastrointestinal: No abdominal or epigastric pain. No nausea, vomiting or hematemesis; No diarrhea or constipation. No melena or hematochezia.  Genitourinary: No dysuria, frequency, hematuria or incontinence  Rectal: No pain, hemorrhoids or incontinence  Neurological: No headaches, memory loss, loss of strength, numbness or tremors  Skin: No itching, burning, rashes or lesions   Lymph Nodes: No enlarged glands  Endocrine: No heat or cold intolerance; No hair loss  Musculoskeletal: No joint pain or swelling; No muscle, back or extremity pain  Psychiatric: No depression, anxiety, mood swings or difficulty sleeping  Heme/Lymph: No easy bruising or bleeding gums  Allergy and Immunologic: No hives or eczema    PHYSICAL EXAM:  General: Appears alert and cooperative.  HEENT: Head; normocephalic, atraumatic.  Eyes: Pupils reactive, cornea wnl.  Neck: Supple, no nodes adenopathy, no NVD or carotid bruit or thyromegaly.  CARDIOVASCULAR: Normal S1 and S2, No murmur, rub, gallop or lift.   LUNGS: No rales, rhonchi or wheeze. Normal breath sounds bilaterally.  ABDOMEN: Soft, nontender without mass or organomegaly. bowel sounds normoactive.  EXTREMITIES: No clubbing, cyanosis or edema. Distal pulses wnl.   SKIN: warm and dry with normal turgor.  NEURO: Alert/oriented x 3/normal motor exam. No pathologic reflexes.    PSYCH: normal affect.        LABS:                        12.5   7.80  )-----------( 139      ( 28 Feb 2024 06:33 )             39.3     02-28    140  |  102  |  40.7<H>  ----------------------------<  90  4.5   |  24.0  |  1.40<H>    Ca    8.5      28 Feb 2024 06:33  Phos  3.8     02-27  Mg     2.4     02-27      BEBETO DURÁN        Urinalysis Basic - ( 28 Feb 2024 06:33 )    Color: x / Appearance: x / SG: x / pH: x  Gluc: 90 mg/dL / Ketone: x  / Bili: x / Urobili: x   Blood: x / Protein: x / Nitrite: x   Leuk Esterase: x / RBC: x / WBC x   Sq Epi: x / Non Sq Epi: x / Bacteria: x        RADIOLOGY & ADDITIONAL STUDIES:    INTERPRETATION OF TELEMETRY (personally reviewed):      ECHO:     < from: TTE W or WO Ultrasound Enhancing Agent (02.23.24 @ 11:03) >  Left Ventricle:  The left ventricular cavity is normal in size. Left ventricular wall thickness is mildly increased. The interventricular septum is flattened in systole and diastole consistent with right ventricular pressure and volume overload. Left ventricular systolic function is moderately to severely decreased with a calculated ejection fraction of 31 % by the Jamil's biplane method of disks with an ejection fraction visually estimated at 30 to 35%. There is mild (grade 1) left ventricular diastolic dysfunction. Mild left ventricular hypertrophy.     Right Ventricle:  The right ventricular cavity is moderately enlarged in size and severely reduced systolic function. Tricuspid annular plane systolic excursion (TAPSE) is 1.0 cm (normal >=1.7 cm).     Left Atrium:  The left atrium is mildly dilated with an indexed volume of 34.37 ml/m². The pulmonary venous systolic velocity is blunted consistent with elevated left atrial pressure.     Right Atrium:  The right atrium is moderately dilated with an indexed volume of 36.86 ml/m².     Interatrial Septum:  The interatrial septum appears intact.     Aortic Valve:  ( a transcatheter deployed (TAVR).     Mitral Valve:  Patient had MV annuloplasty. There is mitral valve thickening of the anterior and posterior leaflets.     Tricuspid Valve:  Structurally normal tricuspid valve with normal leaflet excursion. There is moderate tricuspid regurgitation. Estimated pulmonary artery systolic pressure is 86 mmHg, consistent with severe pulmonary hypertension.     Pulmonic Valve:  Structurally normal pulmonic valve with normal leaflet excursion. There is mild pulmonic regurgitation.     Pulmonary Artery:  The main pulmonary artery is normal in size, origin, and position.     Pericardium:  No pericardial effusion seen.     Systemic Veins:  The inferior vena cava is dilated measuring 2.37 cm in diameter, (dilated >2.1cm) with abnormal inspiratory collapse (abnormal <50%) consistent with elevated right atrial pressure (~15, range 10-20mmHg).  ____________________________________________________________________  QUANTITATIVE DATA:  Left Ventricle Measurements: (Indexed to BSA)     IVSd (2D):   1.4 cm  LVPWd (2D):  1.3 cm  LVIDd (2D):  4.2 cm  LVIDs (2D):  3.2 cm  LV Mass:     215 g  123.9 g/m²  LV Vol d, MOD A2C: 103.0 ml  59.51 ml/m²  LV Vol d, MOD A4C: 79.4 ml   45.87 ml/m²  LV Vol d, MOD BP:  93.1 ml   53.80 ml/m²  LV Vol s, MOD A2C: 74.2 ml   42.87 ml/m²  LV Vol s, MOD A4C: 54.7 ml   31.60 ml/m²  LV Vol s, MOD BP:  64.3 ml   37.15 ml/m²  LVOT SV MOD BP:    28.8 ml  LV EF% MOD BP:     31 %  Visualized LV EF%: 30 to 35%     MV E Vmax:    1.40 m/s  MV A Vmax:    1.05 m/s  MV E/A:       1.33  E/e' lateral: 18.13       Left Atrium Measurements: (Indexed to BSA)  LA Diam 2D:        4.20 cm  LA Vol s, MODA4C: 37.40 ml.  LA Vol s, MOD A2C: 79.90 ml.  LA Vol s, MOD BP:  59.50 ml  34.37 ml/m²    Right Ventricle Measurements: Right Atrial Measurements:     TAPSE:            1.0 cm      RA Vol:       63.80 ml  RV Base (RVID1):  4.5 cm      RA Vol Index: 36.86 ml/m²  RV Mid (RVID2):   4.3 cm  RV Major (RVID3): 6.6 cm       LVOT / RVOT/ Qp/Qs Data: (Indexed to BSA)  LVOT Vmax: 0.84 m/s  LVOT VTI:  15.79 cm    Aortic Valve Measurements:  AV Vmax:                1.6 m/s  AV Peak Gradient:       10.7 mmHg  AV Mean Gradient:       4.8 mmHg  AV VTI:                 35.7 cm  AV VTI Ratio:           0.44  AoV Dimensionless Index 0.44    Mitral Valve Measurements:     MV VTI (tips): 45.86 cm  MV Mean Grad:  2.4 mmHg  MV E Vmax:     1.4 m/s  MV A Vmax:     1.0 m/s  MV E/A:        1.3       Tricuspid Valve Measurements:     TR Vmax:          4.2 m/s  TR Peak Gradient: 70.9 mmHg  RA Pressure:      15 mmHg  PASP:             86 mmHg       ________________________________________________________________________________________  Electronically signed on 2/23/2024 at 1:49:33 PM by Christian Turner DO           < end of copied text >  STRESS TEST:    CARDIAC CATHETERIZATION:    ACTIVE PROBLEMS:  HEALTH ISSUES - PROBLEM Dx:  Acute decompensated heart failure    MARGARITA (acute kidney injury)    Pulmonary hypertension

## 2024-02-28 NOTE — AIRWAY PLACEMENT NOTE ADULT - AIRWAY COMMENTS:
CPR ongoing
Code Blue called - I began to ambu the patient with 100% O2 until team arrived.  Anesthesia arrived and intubated the patient with 8.0 oral ETT secured at 21 cm, at the lip. + ETCO2 change and + BBS.  Pt lost pulse shortly thereafter and CPR was started again & ROSC.  Pt then placed on transport vent on AC 22/ 430/ 100%/+6 - after lines placed pt transported on vent to MICU with the ICU RN & MICU Team of PA & .  Pt placed on same vent settings on Early ventilatior inMICU Bed 11.  CXR and ABG to follow

## 2024-02-28 NOTE — CONSULT NOTE ADULT - SUBJECTIVE AND OBJECTIVE BOX
Patient is a 83y old  Female who presents with a chief complaint of Legs full of water  Cellulitis  CHF (28 Feb 2024 12:03)      BRIEF HOSPITAL COURSE: 82 y/o F with a h/o HFrEF, CAD (s/p CABG), pHTN, s/p MVR, s/p TAVR, COPD (on intermittent 4L NC), CKD, HTN, DM2, admitted on 2/23 with complaints of worsening bilateral lower extremity edema that has become painful. She also reported an increased requirement in her home O2. She has been treated on the medicine service for acute on chronic CHF, lower extremity cellulitis, and MARGARITA on CKD.     Events last 24 hours: Responded to Code Blue overhead as patient was reported to have developed hypoxemia and subsequent asystolic cardiac arrest. Of note, her CXR from earlier in the day was suggestive of worsening pulmonary edema. CPR/ACLS begun immediately with ROSC achieved in approx 10 mins. Subsequent profound shock state requiring triple IV vasopressor therapy. Lactate 11.9.       PAST MEDICAL & SURGICAL HISTORY:  CAD (coronary artery disease)      Congestive heart failure (CHF)      HTN (hypertension)      Borderline diabetes      COPD without exacerbation      S/P MVR (mitral valve repair)      S/P TAVR (transcatheter aortic valve replacement)      S/P CABG (coronary artery bypass graft)        Allergies    No Known Allergies    Intolerances      FAMILY HISTORY:  FH: heart disease (Father, Mother)        Review of Systems:  Unable to obtain secondary to AMS, intubation.          Medications:  meropenem Injectable      meropenem Injectable 500 milliGRAM(s) IV Push once  vancomycin  IVPB 1000 milliGRAM(s) IV Intermittent once  aMIOdarone IVPB 150 milliGRAM(s) IV Intermittent once  buMETAnide Infusion 1 mG/Hr IV Continuous <Continuous>  phenylephrine    Infusion 0.1 MICROgram(s)/kG/Min IV Continuous <Continuous>  ipratropium    for Nebulization 500 MICROGram(s) Nebulizer every 6 hours  levalbuterol Inhalation 0.63 milliGRAM(s) Inhalation every 6 hours  acetaminophen     Tablet .. 650 milliGRAM(s) Oral every 6 hours PRN  fentaNYL    Injectable 50 MICROGram(s) IV Push once  propofol Infusion 5 MICROgram(s)/kG/Min IV Continuous <Continuous>  aspirin enteric coated 81 milliGRAM(s) Oral daily  heparin   Injectable 3000 Unit(s) IV Push every 6 hours PRN  heparin   Injectable 6000 Unit(s) IV Push every 6 hours PRN  heparin  Infusion.  Unit(s)/Hr IV Continuous <Continuous>  dextrose 50% Injectable 25 Gram(s) IV Push once  dextrose 50% Injectable 25 Gram(s) IV Push once  dextrose 50% Injectable 12.5 Gram(s) IV Push once  dextrose Oral Gel 15 Gram(s) Oral once PRN  glucagon  Injectable 1 milliGRAM(s) IntraMuscular once  insulin lispro (ADMELOG) corrective regimen sliding scale   SubCutaneous every 6 hours  simvastatin 40 milliGRAM(s) Oral at bedtime  vasopressin Infusion 0.04 Unit(s)/Min IV Continuous <Continuous>  calcium gluconate IVPB 2 Gram(s) IV Intermittent once  dextrose 5%. 1000 milliLiter(s) IV Continuous <Continuous>  dextrose 5%. 1000 milliLiter(s) IV Continuous <Continuous>  chlorhexidine 0.12% Liquid 15 milliLiter(s) Oral Mucosa every 12 hours        Mode: AC/ CMV (Assist Control/ Continuous Mandatory Ventilation)  RR (machine): 22  TV (machine): 430  FiO2: 100  PEEP: 6  ITime: 0.8  MAP: 14  PIP: 33      ICU Vital Signs Last 24 Hrs  T(C): 36.6 (28 Feb 2024 19:58), Max: 36.9 (28 Feb 2024 00:24)  T(F): 97.9 (28 Feb 2024 19:58), Max: 98.4 (28 Feb 2024 00:24)  HR: 88 (28 Feb 2024 23:16) (70 - 145)  BP: 102/56 (28 Feb 2024 19:58) (102/56 - 127/90)  BP(mean): 75 (28 Feb 2024 16:29) (75 - 75)  ABP: --  ABP(mean): --  RR: 20 (28 Feb 2024 19:58) (18 - 22)  SpO2: 99% (28 Feb 2024 23:16) (92% - 99%)    O2 Parameters below as of 28 Feb 2024 21:25  Patient On (Oxygen Delivery Method): nasal cannula          Vital Signs Last 24 Hrs  T(C): 36.6 (28 Feb 2024 19:58), Max: 36.9 (28 Feb 2024 00:24)  T(F): 97.9 (28 Feb 2024 19:58), Max: 98.4 (28 Feb 2024 00:24)  HR: 88 (28 Feb 2024 23:16) (70 - 145)  BP: 102/56 (28 Feb 2024 19:58) (102/56 - 127/90)  BP(mean): 75 (28 Feb 2024 16:29) (75 - 75)  RR: 20 (28 Feb 2024 19:58) (18 - 22)  SpO2: 99% (28 Feb 2024 23:16) (92% - 99%)    Parameters below as of 28 Feb 2024 21:25  Patient On (Oxygen Delivery Method): nasal cannula        ABG - ( 28 Feb 2024 23:34 )  pH, Arterial: 7.180 pH, Blood: x     /  pCO2: 67    /  pO2: 104   / HCO3: 25    / Base Excess: -3.4  /  SaO2: 98.7                I&O's Detail    27 Feb 2024 07:01  -  28 Feb 2024 07:00  --------------------------------------------------------  IN:    Oral Fluid: 240 mL  Total IN: 240 mL    OUT:    Voided (mL): 2500 mL  Total OUT: 2500 mL    Total NET: -2260 mL      28 Feb 2024 07:01  -  28 Feb 2024 23:52  --------------------------------------------------------  IN:  Total IN: 0 mL    OUT:    Voided (mL): 700 mL  Total OUT: 700 mL    Total NET: -700 mL            LABS:                        13.6   11.08 )-----------( 108      ( 28 Feb 2024 22:25 )             43.8     02-28    141  |  94<L>  |  41.3<H>  ----------------------------<  255<H>  4.8   |  24.0  |  1.94<H>    Ca    8.0<L>      28 Feb 2024 22:25  Phos  8.1     02-28  Mg     2.8     02-28    TPro  5.8<L>  /  Alb  3.1<L>  /  TBili  0.4  /  DBili  x   /  AST  140<H>  /  ALT  72<H>  /  AlkPhos  107  02-28          CAPILLARY BLOOD GLUCOSE      POCT Blood Glucose.: 122 mg/dL (28 Feb 2024 21:46)    PT/INR - ( 28 Feb 2024 18:45 )   PT: 12.4 sec;   INR: 1.12 ratio         PTT - ( 28 Feb 2024 18:45 )  PTT:31.6 sec  Urinalysis Basic - ( 28 Feb 2024 22:25 )    Color: x / Appearance: x / SG: x / pH: x  Gluc: 255 mg/dL / Ketone: x  / Bili: x / Urobili: x   Blood: x / Protein: x / Nitrite: x   Leuk Esterase: x / RBC: x / WBC x   Sq Epi: x / Non Sq Epi: x / Bacteria: x      CULTURES:  Culture Results:   No growth (02-23 @ 06:00)  Culture Results:   No growth at 5 days (02-23 @ 02:40)  Culture Results:   No growth at 5 days (02-23 @ 02:35)        Physical Examination:    General: intubated, lethargic but opening eyes and moving around a bit    HEENT: Pupils equal, reactive to light.  Symmetric.    PULM: coarse breath sounds bilaterally, no significant sputum production    CVS: Regular rate and rhythm, no murmurs, rubs, or gallops    ABD: Soft, nondistended, nontender, normoactive bowel sounds, no masses    EXT: bilateral lower extremity edema    SKIN: cold distal extremities    NEURO: lethargic, opening eyes but not tracking, moving arms but not purposefully, not following commands      RADIOLOGY:     < from: Xray Chest 1 View-PORTABLE IMMEDIATE (Xray Chest 1 View-PORTABLE IMMEDIATE .) (02.28.24 @ 12:34) >  Heart magnified by technique. Sternotomy and however aortic valve again   noted.    There is a mild interstitial congestive pattern slightly increased from   February 26.    IMPRESSION: Increasing interstitial CHF.            CRITICAL CARE TIME SPENT: 90 mins  Time spent evaluating/treating patient with medical issues that pose a high risk for life threatening deterioration and/or end-organ damage, reviewing data/labs/imaging, discussing case with multidisciplinary team, discussing plan/goals of care with patient/family. Non-inclusive of procedure time. The date of entry of this note reflects the date of services rendered.

## 2024-02-28 NOTE — CONSULT NOTE ADULT - ASSESSMENT
82 y/o F with a h/o HFrEF, CAD (s/p CABG), s/p MVR, s/p TAVR, pHTN, COPD (on intermittent 4L NC), CKD, HTN, DM2, with:    # Cardiac arrest  # Acute hypoxemic respiratory failure  # Undifferentiated shock  # Acute systolic heart failure  # MARGARITA  # Lower extremity cellulitis    Transfer to MICU.    As per the history she seemed to have suffered a hypoxia induced cardiac arrest. CXR from earlier in the day was suggestive of worsening pulmonary edema. Anesthesia reported that her airway was filled with secretions.    - ROSC achieved in approx 10 mins, no shockable cardiac rhythms  - suspect shock state is multifactorial- distributive secondary to massive post-arrest SIRS with likely cardiogenic component as well  - started on norepinephrine infusion to maintain a MAP > 65  - added phenylephrine gtt and then fixed dose vasopressin  - start stress dose hydrocortisone  - start bumetanide infusion for aggressive diuresis  - discontinue metoprolol  - MARGARITA is worsening, will monitor UOP closely, high risk for requiring hemodialysis  82 y/o F with a h/o HFrEF, CAD (s/p CABG), s/p MVR, s/p TAVR, pHTN, COPD (on intermittent 4L NC), CKD, HTN, DM2, with:    # Cardiac arrest  # Acute hypoxemic/hypercapnic respiratory failure  # Undifferentiated shock  # Acute systolic heart failure  # MARGARITA  # Lower extremity cellulitis    Transfer to MICU.    As per the history she seemed to have suffered a hypoxia induced cardiac arrest. CXR from earlier in the day was suggestive of worsening pulmonary edema. Anesthesia reported that her airway was filled with secretions.    - ROSC achieved in approx 10 mins, no shockable cardiac rhythms  - suspect shock state is multifactorial- distributive secondary to massive post-arrest SIRS with likely cardiogenic component as well  - started on norepinephrine infusion to maintain a MAP > 65  - added phenylephrine gtt and then fixed dose vasopressin  - start stress dose hydrocortisone  - given 150mg IV amiodarone x 1 for SVT, HR improving, back in NSR  - discontinue metoprolol  - start bumetanide infusion for aggressive diuresis  - MARGARITA is worsening, insert del valle cather to monitor UOP closely, high risk for requiring hemodialysis/UF  - trend BUN/Cr, electrolytes, and acid-base balance  - no urgent indication for RRT at this time  - actively titrating ventilator settings to maintain SpO2 > 92%, FiO2 reduced to 90%  - ABG revealing of hypercapnia/resp acidosis, intrinsic RR 30+ bpm,   - f/u CXR  - sedate with propofol to promote vent synchrony, wean as able to accurately observe neuro exam      The medicine team notified the patient's family during the Code Blue and they have come into the hospital. Dr. Cortés updated them on tonight's events. Goals of care discussion ongoing.     Case discussed with MICU physician, Dr. Cortés.   84 y/o F with a h/o HFrEF, CAD (s/p CABG), s/p MVR, s/p TAVR, pHTN, COPD (on intermittent 4L NC), CKD, HTN, DM2, with:    # Cardiac arrest  # Acute hypoxemic/hypercapnic respiratory failure  # Undifferentiated shock  # Acute systolic heart failure  # MARGARITA  # Lower extremity cellulitis    Transfer to MICU.    As per the history she seemed to have suffered a hypoxia induced cardiac arrest. CXR from earlier in the day was suggestive of worsening pulmonary edema. Anesthesia reported that her airway was filled with secretions.    - ROSC achieved in approx 10 mins, no shockable cardiac rhythms  - suspect shock state is multifactorial- distributive secondary to massive post-arrest SIRS with likely cardiogenic component as well  - started on norepinephrine infusion to maintain a MAP > 65  - added phenylephrine gtt and then fixed dose vasopressin  - start stress dose hydrocortisone  - given 150mg IV amiodarone x 1 for SVT, HR improving, back in NSR  - discontinue metoprolol  - start bumetanide infusion for aggressive diuresis  - MARGARITA is worsening, insert del valle cather to monitor UOP closely, high risk for requiring hemodialysis/UF  - trend BUN/Cr, electrolytes, and acid-base balance  - no urgent indication for RRT at this time  - actively titrating ventilator settings to maintain SpO2 > 92%, FiO2 reduced to 90%  - ABG revealing of hypercapnia/resp acidosis, intrinsic RR 30+ bpm,   - f/u repeat CXR  - blood cultures sent, add sputum culture as well  - start empiric meropenem and vancomycin in case of superimposed bacterial pneumonia  - sedate with propofol to promote vent synchrony, wean as able to accurately observe neuro exam      The medicine team notified the patient's family during the Code Blue and they have come into the hospital. Dr. Cortés updated them on tonight's events. Goals of care discussion ongoing.     Case discussed with MICU physician, Dr. Cortés.

## 2024-02-28 NOTE — CONSULT NOTE ADULT - NS PANP COMMENT GEN_ALL_CORE FT
83F former smoker with hx of COPD on 4L, HFrEF (30-35%), severe pulmonary HTN, CAD s/p CABG, s/p MVR, s/p TAVR, DM, CKD presented 2/23 with lower extremity edema with associated pain and worsening O2 requirements admitted to medicine for diuresis and was being treated for possible lower extremity cellulitis. Echo showed EF 30-35% with RV enlargement and severe RV dysfunction. Noted with new onset Afib yesterday evening and started on heparin drip and lopressor. Overnight, pt reportedly was noted to be hypoxic on telemetry with subsequent bradycardia followed by asystolic cardiac arrest. Pt underwent ACLS protocol with total of ~9-10 downtime with ROSC. She was noted with secretions in the airway during intubation. Post arrest was in severe shock state requiring 3 pressors. Bedside ultrasound with reduced LVEF, enlarged RV with septal flattening, no pericardial effusion, plump IVC and diffuse B lines. Also noted with Afib RVR and given amiodarone with improvement in rate. Pt underwent del valle placement by urology given difficulty. Minimal output noted post arrest and given bumex push w/ bumex infusion and metolazone. If no response, will need nephrology eval and likely CRRT. Started on propofol to facilitate vent synchrony given persistently acidotic ABG and tachypnea. Pt also noted with uptrending troponin; remains on heparin gtt. Pt able to wean off Tomi and Levo requirements downtrending. Fio2 requirements down to 80%. Pt with no noted purposeful activity but was lifting upper extremities following arrival in the MICU. EEG in progress. Will start empiric broad spectrum abx pending cultures. Will obtain CT head/chest/abd/pelvis when stable. Discussed with pt's daughters at the bedside that she is critically ill requiring high levels of life support and is at risk of recurrent cardiac arrest. Advised that she is at risk for hypoxic ischemic injury but as of now it is difficult to assess her neurologic prognosis but will aim to reduced sedation as able for ongoing assessment. Also advised that given her low urine output she may require HD. Anabela indicated that pt would not want to be dependent on machines and kept alive if no chance for meaningful recovery. I discussed that options at this time would be to continue all aggressive care including resuscitation, setting limits to interventions and instating DNR if she has recurrent cardiac arrest or focusing on the patient's comfort. Advised that given her level of support, if she were to have a cardiac arrest, she is unlikely to benefit from resuscitation given this would add further injury and we would only be able to provide the same support she is receiving now. Daughters were not in agreement with DNR at this time so will remain full code with all interventions pending further goals of care.

## 2024-02-28 NOTE — PROGRESS NOTE ADULT - ASSESSMENT
83 year old female with Hypertension, Diabetes, COPD on intermittent 4LNC, CHF, CAD s/p CABG, MVR and TAVR presents with swelling of legs for past 2 months, gotten worse over the past 4 days now painful affecting ambulation Supplemental O2 for the past 2 months intermittently but has been using it consistently recently.  Admitted for acute heart failure exacerbation.      #CHF reduced EF   #Chronic respiratory failure  Telemetry monitoring  I/O, daily weight, salt and fluid restriction.  Continue Lasix to 80 IV BID  Added Aldactone  Metoprolol  Hold Ace inhibitor given MARGARITA  TTE with EF of 30-35% and severe PH  Cardio recs appreciated  HF consult appreciated  Will repeat CXR today.    #Cellulitis, Bilateral LE  Cefazolin 1gm q12  Blood cx with NGTD  Keep legs elevated  Improving    # MARGARITA on CKD3b  Baseline SCr ~ 1.6  as per cardiology  I/O, monitor renal function - now improved.  avoid Nephrotoxins, (ACEI on hold)  Monitor Cr with diuresis     # CAD  indeterminate Troponin in setting of renal failure  Monitor  ASA, Statin, BB  Cardio recs appreciated    # T2DM  Hold oral hypoglycemic agent  Blood glucose monitoring with sliding scale Lispro    # COPD  Mild hypercarbia  Patient stopped using Stiolto at home.  Supplemental O2, target SpO2 88-94  Albuterol/Ipratropium Nebulizer PRN  Follow up with Pulmonology as outpatient    VTE Prophylaxis: Heparin ordered  Intermittent Venous compression device bilaterally  Mobilize, increase as tolerated - PT recommended home PT.    Discussed with patient and daughter at bedside    Dispo: Pending course, remains acute

## 2024-02-28 NOTE — PROGRESS NOTE ADULT - SUBJECTIVE AND OBJECTIVE BOX
CC: Legs full of water  Cellulitis  CHF (28 Feb 2024 09:38)    HPI:  83 year old female with Hypertension, Diabetes, COPD on intermittent 4LNC, CHF, CAD s/p CABG, MVR and TAVR presents with swelling of legs for past 2 months.    INTERVAL HPI/OVERNIGHT EVENTS: Patient seen and examined sitting up in bed.  Patient denies any headache, dizziness, SOB, CP, abdominal pain, nausea, vomiting, dysuria. Other ROS reviewed and are negative.      Vital Signs Last 24 Hrs  T(C): 36.8 (28 Feb 2024 08:48), Max: 36.9 (27 Feb 2024 21:10)  T(F): 98.2 (28 Feb 2024 08:48), Max: 98.5 (27 Feb 2024 21:10)  HR: 84 (28 Feb 2024 08:58) (83 - 96)  BP: 103/52 (28 Feb 2024 08:48) (97/59 - 116/56)  BP(mean): 79 (27 Feb 2024 21:10) (72 - 79)  RR: 18 (28 Feb 2024 08:48) (18 - 20)  SpO2: 92% (28 Feb 2024 08:58) (92% - 97%)    Parameters below as of 28 Feb 2024 08:58  Patient On (Oxygen Delivery Method): nasal cannula, 4L      I&O's Detail    27 Feb 2024 07:01  -  28 Feb 2024 07:00  --------------------------------------------------------  IN:    Oral Fluid: 240 mL  Total IN: 240 mL    OUT:    Voided (mL): 2500 mL  Total OUT: 2500 mL    Total NET: -2260 mL      PHYSICAL EXAM:  GENERAL: NAD  HEAD:  Atraumatic, Normocephalic  NECK: Supple, No JVD, Normal thyroid  NERVOUS SYSTEM:  Alert, generalized weakness  CHEST/LUNG: (+) air entry bilaterally  HEART: Regular rate and rhythm; No murmurs, rubs, or gallops  ABDOMEN: Soft, Nontender, Nondistended; Bowel sounds present  EXTREMITIES:  2+ Peripheral Pulses, improved LE edema  SKIN: bilateral LE erythema                            12.5   7.80  )-----------( 139      ( 28 Feb 2024 06:33 )             39.3     28 Feb 2024 06:33    140    |  102    |  40.7   ----------------------------<  90     4.5     |  24.0   |  1.40     Ca    8.5        28 Feb 2024 06:33  Phos  3.8       27 Feb 2024 04:54  Mg     2.4       27 Feb 2024 04:54        CAPILLARY BLOOD GLUCOSE  POCT Blood Glucose.: 116 mg/dL (28 Feb 2024 08:02)  POCT Blood Glucose.: 121 mg/dL (27 Feb 2024 20:53)  POCT Blood Glucose.: 114 mg/dL (27 Feb 2024 17:08)  POCT Blood Glucose.: 101 mg/dL (27 Feb 2024 12:11)      Urinalysis Basic - ( 28 Feb 2024 06:33 )    Color: x / Appearance: x / SG: x / pH: x  Gluc: 90 mg/dL / Ketone: x  / Bili: x / Urobili: x   Blood: x / Protein: x / Nitrite: x   Leuk Esterase: x / RBC: x / WBC x   Sq Epi: x / Non Sq Epi: x / Bacteria: x        MEDICATIONS  (STANDING):  albuterol/ipratropium for Nebulization 3 milliLiter(s) Nebulizer every 6 hours  aspirin enteric coated 81 milliGRAM(s) Oral daily  ceFAZolin  Injectable. 1000 milliGRAM(s) IV Push every 12 hours  dextrose 5%. 1000 milliLiter(s) (100 mL/Hr) IV Continuous <Continuous>  dextrose 5%. 1000 milliLiter(s) (50 mL/Hr) IV Continuous <Continuous>  dextrose 50% Injectable 12.5 Gram(s) IV Push once  dextrose 50% Injectable 25 Gram(s) IV Push once  dextrose 50% Injectable 25 Gram(s) IV Push once  furosemide   Injectable 80 milliGRAM(s) IV Push every 12 hours  glucagon  Injectable 1 milliGRAM(s) IntraMuscular once  heparin   Injectable 5000 Unit(s) SubCutaneous every 8 hours  insulin lispro (ADMELOG) corrective regimen sliding scale   SubCutaneous at bedtime  insulin lispro (ADMELOG) corrective regimen sliding scale   SubCutaneous three times a day before meals  metoprolol succinate ER 50 milliGRAM(s) Oral daily  simvastatin 40 milliGRAM(s) Oral at bedtime  spironolactone 25 milliGRAM(s) Oral daily    MEDICATIONS  (PRN):  acetaminophen     Tablet .. 650 milliGRAM(s) Oral every 6 hours PRN Temp greater or equal to 38C (100.4F), Mild Pain (1 - 3), Moderate Pain (4 - 6)  dextrose Oral Gel 15 Gram(s) Oral once PRN Blood Glucose LESS THAN 70 milliGRAM(s)/deciliter

## 2024-02-28 NOTE — PROVIDER CONTACT NOTE (CRITICAL VALUE NOTIFICATION) - PERSON GIVING RESULT:
Goal Outcome Evaluation:  Plan of Care Reviewed With: patient           Outcome Evaluation: Upon entering room, pt. sitting up in chair, awake/alert, and agreeable to work with P.T. this date. Pt. able to ambulate 360 feet, SBA x 1, with no use of A.D. this AM.  Pt. requires SBA x 1 for sit <-> stand transfers.  Cardiac ther. ex. program x 10 reps completed for general strengthening/stretching.  Will continue to progress functional mobility as tolerated.    Patient was wearing a face mask during this therapy encounter. Therapist used appropriate personal protective equipment including eye protection, mask, and gloves.  Mask used was standard procedure mask. Appropriate PPE was worn during the entire therapy session. Hand hygiene was completed before and after therapy session. Patient is not in enhanced droplet precautions.      Terrell

## 2024-02-28 NOTE — AIRWAY PLACEMENT NOTE ADULT - POST AIRWAY PLACEMENT ASSESSMENT:
breath sounds bilateral
breath sounds bilateral/breath sounds equal/positive end tidal CO2 noted/CXR pending/chest excursion noted

## 2024-02-29 NOTE — PROGRESS NOTE ADULT - SUBJECTIVE AND OBJECTIVE BOX
Patient is a 83y old  Female who presents with a chief complaint of Legs full of water  Cellulitis  CHF (29 Feb 2024 14:11)      BRIEF HOSPITAL COURSE: 84 y/o F with a h/o HFrEF, CAD (s/p CABG), pHTN, s/p MVR, s/p TAVR, COPD (on intermittent 4L NC), CKD, HTN, DM2, admitted on 2/23 with complaints of worsening bilateral lower extremity edema that has become painful. She also reported an increased requirement in her home O2. She has been treated on the medicine service for acute on chronic CHF, lower extremity cellulitis, and MARGARITA on CKD. She suffered sudden cardiopulmonary arrest on 2/28 in the setting of progressive hypoxemia- ROSC achieved in approx 10 mins. Subsequent MSOF and profound shock state requiring triple IV vasopressor therapy.    Events last 24 hours: Patient remains dependent on full mechanical vent support and dual IV vasopressors. Dobutamine added given low LVEF. Remains oliguric on bumetanide infusion. Family does not wish to pursue hemodialysis/UF. Sedated on dexmedetomidine infusion, however will arouse and follow simple commands.      PAST MEDICAL & SURGICAL HISTORY:  CAD (coronary artery disease)      Congestive heart failure (CHF)      HTN (hypertension)      Borderline diabetes      COPD without exacerbation      S/P MVR (mitral valve repair)      S/P TAVR (transcatheter aortic valve replacement)      S/P CABG (coronary artery bypass graft)          Review of Systems:  Unable to obtain secondary to sedation/intubation.        Medications:  meropenem Injectable 1000 milliGRAM(s) IV Push every 8 hours    buMETAnide Infusion 1 mG/Hr IV Continuous <Continuous>  DOBUTamine Infusion 2.5 MICROgram(s)/kG/Min IV Continuous <Continuous>  norepinephrine Infusion 0.05 MICROgram(s)/kG/Min IV Continuous <Continuous>    ipratropium    for Nebulization 500 MICROGram(s) Nebulizer every 6 hours  levalbuterol Inhalation 0.63 milliGRAM(s) Inhalation every 6 hours    acetaminophen   IVPB .. 1000 milliGRAM(s) IV Intermittent once  dexMEDEtomidine Infusion 0.2 MICROgram(s)/kG/Hr IV Continuous <Continuous>  fentaNYL    Injectable 25 MICROGram(s) IV Push every 4 hours PRN      aspirin  chewable 81 milliGRAM(s) Oral daily  heparin   Injectable 6000 Unit(s) IV Push every 6 hours PRN  heparin   Injectable 3000 Unit(s) IV Push every 6 hours PRN  heparin  Infusion.  Unit(s)/Hr IV Continuous <Continuous>    pantoprazole  Injectable 40 milliGRAM(s) IV Push daily      dextrose 50% Injectable 25 Gram(s) IV Push once  dextrose 50% Injectable 12.5 Gram(s) IV Push once  dextrose 50% Injectable 25 Gram(s) IV Push once  dextrose Oral Gel 15 Gram(s) Oral once PRN  glucagon  Injectable 1 milliGRAM(s) IntraMuscular once  hydrocortisone sodium succinate Injectable 100 milliGRAM(s) IV Push every 8 hours  insulin lispro (ADMELOG) corrective regimen sliding scale   SubCutaneous every 6 hours  vasopressin Infusion 0.04 Unit(s)/Min IV Continuous <Continuous>    dextrose 5%. 1000 milliLiter(s) IV Continuous <Continuous>  dextrose 5%. 1000 milliLiter(s) IV Continuous <Continuous>      chlorhexidine 0.12% Liquid 15 milliLiter(s) Oral Mucosa every 12 hours        Mode: AC/ CMV (Assist Control/ Continuous Mandatory Ventilation)  RR (machine): 22  TV (machine): 500  FiO2: 80  PEEP: 6  ITime: 0.8  MAP: 15  PIP: 28      ICU Vital Signs Last 24 Hrs  T(C): 37.3 (29 Feb 2024 23:30), Max: 38.6 (29 Feb 2024 08:00)  T(F): 99.1 (29 Feb 2024 23:30), Max: 101.5 (29 Feb 2024 08:00)  HR: 89 (29 Feb 2024 23:30) (82 - 108)  BP: 93/46 (29 Feb 2024 05:00) (86/60 - 110/50)  BP(mean): 61 (29 Feb 2024 05:00) (61 - 79)  ABP: 130/54 (29 Feb 2024 23:30) (105/45 - 151/59)  ABP(mean): 79 (29 Feb 2024 23:30) (61 - 89)  RR: 24 (29 Feb 2024 23:30) (10 - 41)  SpO2: 97% (29 Feb 2024 23:30) (91% - 100%)    O2 Parameters below as of 29 Feb 2024 21:14  Patient On (Oxygen Delivery Method): ventilator            ABG - ( 29 Feb 2024 21:50 )  pH, Arterial: 7.390 pH, Blood: x     /  pCO2: 46    /  pO2: 155   / HCO3: 28    / Base Excess: 2.8   /  SaO2: 100.0               I&O's Detail    28 Feb 2024 07:01  -  29 Feb 2024 07:00  --------------------------------------------------------  IN:    Bumetanide: 25 mL    Heparin Infusion: 71.5 mL    Norepinephrine: 481.7 mL    Propofol: 59.4 mL    Vasopressin: 48 mL  Total IN: 685.6 mL    OUT:    Indwelling Catheter - Urethral (mL): 15 mL    Voided (mL): 700 mL  Total OUT: 715 mL    Total NET: -29.4 mL      29 Feb 2024 07:01  -  29 Feb 2024 23:49  --------------------------------------------------------  IN:    Bumetanide: 85 mL    Dexmedetomidine: 29.6 mL    DOBUTamine: 36.8 mL    Heparin Infusion: 55 mL    IV PiggyBack: 100 mL    Norepinephrine: 512 mL    Propofol: 13.3 mL    Sodium Bicarbonate: 562.5 mL    Vasopressin: 102 mL  Total IN: 1496.2 mL    OUT:    Indwelling Catheter - Urethral (mL): 525 mL  Total OUT: 525 mL    Total NET: 971.2 mL            LABS:                        14.1   32.27 )-----------( 120      ( 29 Feb 2024 12:20 )             44.8     02-29    141  |  97  |  68.1<H>  ----------------------------<  214<H>  5.0   |  24.0  |  3.06<H>    Ca    7.3<L>      29 Feb 2024 22:25  Phos  7.8     02-29  Mg     2.3     02-29    TPro  5.7<L>  /  Alb  3.4  /  TBili  0.9  /  DBili  x   /  AST  1522<H>  /  ALT  635<H>  /  AlkPhos  83  02-29          CAPILLARY BLOOD GLUCOSE      POCT Blood Glucose.: 198 mg/dL (29 Feb 2024 22:28)    PT/INR - ( 29 Feb 2024 05:40 )   PT: 16.9 sec;   INR: 1.54 ratio         PTT - ( 29 Feb 2024 22:30 )  PTT:39.2 sec  Urinalysis Basic - ( 29 Feb 2024 22:25 )    Color: x / Appearance: x / SG: x / pH: x  Gluc: 214 mg/dL / Ketone: x  / Bili: x / Urobili: x   Blood: x / Protein: x / Nitrite: x   Leuk Esterase: x / RBC: x / WBC x   Sq Epi: x / Non Sq Epi: x / Bacteria: x      CULTURES:  Culture Results:   No growth (02-23-24 @ 06:00)  Culture Results:   No growth at 5 days (02-23-24 @ 02:40)  Culture Results:   No growth at 5 days (02-23-24 @ 02:35)        Physical Examination:    General: intubated, sedated    HEENT: Pupils equal, reactive to light.  Symmetric.    PULM: coarse breath sounds bilaterally, no significant sputum production    CVS: Regular rate and rhythm, no murmurs, rubs, or gallops    ABD: Soft, nondistended, nontender, normoactive bowel sounds, no masses    EXT: bilateral lower extremity edema    SKIN: cool distal extremities    NEURO: sedated, will arouse and follow commands, moves all extremities with purpose      RADIOLOGY:     < from: CT Abdomen and Pelvis No Cont (02.29.24 @ 08:40) >  FINDINGS:  CHEST:  LUNGS AND LARGE AIRWAYS: The tip of the endotracheal tube is above the   raiza. The central airways are patent. Mild pulmonary edema. Patchy   right lower lobe consolidation may represent superimposed pneumonia.  PLEURA: Small right and trace left pleural effusion.  VESSELS: Aortic atherosclerosis without aneurysm.  HEART: Aortic valve replacement. Mild cardiomegaly. No pericardial   effusion. Coronary and mitral annular calcification.  MEDIASTINUM AND DANA: No adenopathy.  CHEST WALL AND LOWER NECK: No masses.    ABDOMEN AND PELVIS:  LIVER: Normal.  BILE DUCTS: Nondilated.  GALLBLADDER: Gallstones.  SPLEEN: Normal.  PANCREAS: Diffuse atrophy.  ADRENALS: Mild bilateral thickening.  KIDNEYS/URETERS: No hydronephrosis or urinary tract calculi.    BLADDER: Collapsed around a Villeda catheter balloon.  REPRODUCTIVE ORGANS: No masses.    BOWEL: The NG tube is in the stomach. No bowel obstruction. Rectal probe   in place.  PERITONEUM: Trace ascites. No free air or collection.  VESSELS: Aortoiliac atherosclerosis without aneurysm. Right femoral   arterial and venous lines.  RETROPERITONEUM/LYMPH NODES: No adenopathy or hematoma.  ABDOMINAL WALL: Mild bilateral flank edema.  BONES: Bilateral anterior rib fractures, likely from chest compressions.    IMPRESSION:  *  Pulmonary edema and small bilateral pleural effusions.  *  Superimposed right lower lobe pneumonia.  *  No acute abdominal pathology.          < from: CT Head No Cont (02.29.24 @ 08:40) >  FINDINGS:  Exam is limited by artifact from overlying EEG leads.  HEMORRHAGE:  No evidence of intracranial hemorrhage.  BRAIN PARENCHYMA:  Mild atrophy. Moderate patchy white matter ischemic  changes within the cerebral hemispheres. With a chronic appearing lacunar   infarct involving the genu of the left internal capsule. Patchy ischemic   changes and chronic appearing small infarcts involving the bilateral   cerebellar hemispheres..  No mass or mass effect.  VENTRICLES / SHIFT:  No hydrocephalus. No midline shift.  EXTRA-AXIAL / BASAL CISTERNS:  No extra-axial mass. Basal cisterns   preserved.  CALVARIUM AND EXTRACRANIAL SOFT TISSUES:  No depressed calvarial fracture.  SINUSES, ORBITS, MASTOIDS:  The visualized paranasal sinuses and mastoid   air cells are well aerated.  There is marked dilatation and tortuosity of the superior ophthalmic   veins. Further imaging/evaluation when patient's condition permits.    IMPRESSION:  Limited by artifact from overlying EEG leads.  No evidence of an acute intracranial hemorrhage, midline shift, or   hydrocephalus.  Patchy ischemic changes and chronic appearing small infarcts as described.  Interval follow-up if anoxic brain injury remains of clinical concern.  Marked dilatation and tortuosity of the superior ophthalmic veins   bilaterally. Further imaging/evaluation when patient's condition permits.            CRITICAL CARE TIME SPENT: 50 mins  Time spent evaluating/treating patient with medical issues that pose a high risk for life threatening deterioration and/or end-organ damage, reviewing data/labs/imaging, discussing case with multidisciplinary team, discussing plan/goals of care with patient/family. Non-inclusive of procedure time. The date of entry of this note reflects the date of services rendered.

## 2024-02-29 NOTE — PROGRESS NOTE ADULT - PROBLEM SELECTOR PLAN 5
- Monitor renal function closely with diuresis  - Consider nephrology c/s due to MARGARITA on CKD. May need RRT based on GOC.

## 2024-02-29 NOTE — CONSULT NOTE ADULT - REASON FOR ADMISSION
Legs full of water  Cellulitis  CHF

## 2024-02-29 NOTE — PROCEDURE NOTE - NSPROCDETAILS_GEN_ALL_CORE
location identified, draped/prepped, sterile technique used, needle inserted/introduced/positive blood return obtained via catheter/connected to a pressurized flush line/sutured in place/hemostasis with direct pressure, dressing applied/Seldinger technique/all materials/supplies accounted for at end of procedure
sterile technique, indwelling urinary device inserted
guidewire recovered/lumen(s) aspirated and flushed/sterile dressing applied/sterile technique, catheter placed/ultrasound guidance with use of sterile gel and probe cove

## 2024-02-29 NOTE — CONSULT NOTE ADULT - ASSESSMENT
84 y/o F with a h/o HFrEF, CAD (s/p CABG), s/p MVR, s/p TAVR, pHTN, COPD (on intermittent 4L NC), CKD, HTN, DM2 admitted for CHF exacerbation and leg cellulitis. hospital course complicated by Acute hypoxemic/hypercapnic respiratory failure  and Cardiac arrest. Nephrology consulted for Rene.    Rene on CKD ? stage III  Acute decompensated heart failure  Cardiogenic shock  metabolic acidosis    Baseline SCr ~ 1.2 in 2022- no labs available after that  Scr 2.0 on presentation, improved with diuresis to 1.4 and started worsening after cardiac arrest  SCr 2.7 on most recent labs  Imaging reviewed; no hydronephrosis, mass or calculi    Pt on bumex gtt with poor response  Will start on CRRT   84 y/o F with a h/o HFrEF, CAD (s/p CABG), s/p MVR, s/p TAVR, pHTN, COPD (on intermittent 4L NC), CKD, HTN, DM2 admitted for CHF exacerbation and leg cellulitis. hospital course complicated by Acute hypoxemic/hypercapnic respiratory failure  and Cardiac arrest. Nephrology consulted for Rene.    Rene on CKD ? stage III  Acute decompensated heart failure  Cardiogenic shock  metabolic acidosis    Baseline SCr ~ 1.2 in 2022- no labs available after that  Scr 2.0 on presentation, improved with diuresis to 1.4 and started worsening after cardiac arrest  SCr 2.7 on most recent labs  Imaging reviewed; no hydronephrosis, mass or calculi    Pt on bumex gtt with poor response  Also with mild hyeprkalemia  discussed with daughters about option of initiating CRRT but they would like to take time to think about it  continue bumex gtt- repeat BMP at 8 pm    dw dr Cannon

## 2024-02-29 NOTE — PROGRESS NOTE ADULT - SUBJECTIVE AND OBJECTIVE BOX
Patient is a 83y old  Female who presents with a chief complaint of Cellulitis, CHF (29 Feb 2024 13:30)    BRIEF HOSPITAL COURSE: 82 y/o F with a h/o HFrEF, CAD (s/p CABG), pHTN, s/p MVR, s/p TAVR, COPD (on intermittent 4L NC), CKD, HTN, DM2, admitted on 2/23 with complaints of worsening bilateral lower extremity edema that has become painful. She also reported an increased requirement in her home O2. She has been treated on the medicine service for acute on chronic CHF, lower extremity cellulitis, and MARGARITA on CKD.     Events last 24 hours: Responded to Code Blue overhead as patient was reported to have developed hypoxemia and subsequent asystolic cardiac arrest. Of note, her CXR from earlier in the day was suggestive of worsening pulmonary edema. CPR/ACLS begun immediately with ROSC achieved in approx 10 mins. Subsequent profound shock state requiring triple IV vasopressor therapy. Lactate 11.9.     Events last 24 hours: ***    PAST MEDICAL & SURGICAL HISTORY:  CAD (coronary artery disease)      Congestive heart failure (CHF)      HTN (hypertension)      Borderline diabetes      COPD without exacerbation      S/P MVR (mitral valve repair)      S/P TAVR (transcatheter aortic valve replacement)      S/P CABG (coronary artery bypass graft)          Review of Systems:  CONSTITUTIONAL: No fever, chills, or fatigue  NEUROLOGICAL: No headaches, memory loss, loss of strength, numbness, or tremors  HEENT: No eye pain, visual disturbances, or discharge, No difficulty hearing, tinnitus, vertigo; No sinus or throat pain; No pain or stiffness  RESPIRATORY: No cough, wheezing, chills or hemoptysis; No shortness of breath  CARDIOVASCULAR: No chest pain, palpitations, dizziness, or leg swelling  GASTROINTESTINAL: No abdominal or epigastric pain. No nausea, vomiting, or hematemesis; No diarrhea or constipation. No melena or hematochezia.  GENITOURINARY: No dysuria, frequency, hematuria, or incontinence  SKIN: No itching, burning, rashes, or lesions   MUSCULOSKELETAL: No joint pain or swelling; No muscle, back, or extremity pain  PSYCHIATRIC: No depression, anxiety, mood swings, or difficulty sleeping    Medications:  meropenem Injectable 500 milliGRAM(s) IV Push every 12 hours  meropenem Injectable        buMETAnide Infusion 1 mG/Hr IV Continuous <Continuous>  norepinephrine Infusion 0.05 MICROgram(s)/kG/Min IV Continuous <Continuous>    ipratropium    for Nebulization 500 MICROGram(s) Nebulizer every 6 hours  levalbuterol Inhalation 0.63 milliGRAM(s) Inhalation every 6 hours    propofol Infusion 5 MICROgram(s)/kG/Min IV Continuous <Continuous>      aspirin  chewable 81 milliGRAM(s) Oral daily  heparin   Injectable 5000 Unit(s) SubCutaneous every 12 hours    pantoprazole  Injectable 40 milliGRAM(s) IV Push daily      dextrose 50% Injectable 12.5 Gram(s) IV Push once  dextrose 50% Injectable 25 Gram(s) IV Push once  dextrose 50% Injectable 25 Gram(s) IV Push once  dextrose Oral Gel 15 Gram(s) Oral once PRN  glucagon  Injectable 1 milliGRAM(s) IntraMuscular once  hydrocortisone sodium succinate Injectable 100 milliGRAM(s) IV Push every 8 hours  insulin lispro (ADMELOG) corrective regimen sliding scale   SubCutaneous every 6 hours  vasopressin Infusion 0.04 Unit(s)/Min IV Continuous <Continuous>    dextrose 5%. 1000 milliLiter(s) IV Continuous <Continuous>  dextrose 5%. 1000 milliLiter(s) IV Continuous <Continuous>  sodium bicarbonate  Infusion 0.152 mEq/kG/Hr IV Continuous <Continuous>      chlorhexidine 0.12% Liquid 15 milliLiter(s) Oral Mucosa every 12 hours        Mode: AC/ CMV (Assist Control/ Continuous Mandatory Ventilation)  RR (machine): 22  TV (machine): 500  FiO2: 80  PEEP: 6  ITime: 0.8  MAP: 11  PIP: 30      ICU Vital Signs Last 24 Hrs  T(C): 37.7 (29 Feb 2024 13:00), Max: 38.6 (29 Feb 2024 08:00)  T(F): 99.9 (29 Feb 2024 13:00), Max: 101.5 (29 Feb 2024 08:00)  HR: 98 (29 Feb 2024 13:00) (84 - 145)  BP: 93/46 (29 Feb 2024 05:00) (86/60 - 135/62)  BP(mean): 61 (29 Feb 2024 05:00) (61 - 79)  ABP: 111/47 (29 Feb 2024 13:00) (108/44 - 166/65)  ABP(mean): 68 (29 Feb 2024 13:00) (61 - 97)  RR: 25 (29 Feb 2024 13:00) (10 - 41)  SpO2: 95% (29 Feb 2024 13:00) (92% - 100%)    O2 Parameters below as of 29 Feb 2024 12:00  Patient On (Oxygen Delivery Method): ventilator    O2 Concentration (%): 80        ABG - ( 29 Feb 2024 10:43 )  pH, Arterial: 7.280 pH, Blood: x     /  pCO2: 47    /  pO2: 174   / HCO3: 22    / Base Excess: -4.6  /  SaO2: 100.0               I&O's Detail    28 Feb 2024 07:01  -  29 Feb 2024 07:00  --------------------------------------------------------  IN:    Bumetanide: 25 mL    Heparin Infusion: 71.5 mL    Norepinephrine: 481.7 mL    Propofol: 59.4 mL    Vasopressin: 48 mL  Total IN: 685.6 mL    OUT:    Indwelling Catheter - Urethral (mL): 15 mL    Voided (mL): 700 mL  Total OUT: 715 mL    Total NET: -29.4 mL      29 Feb 2024 07:01  -  29 Feb 2024 14:12  --------------------------------------------------------  IN:    Bumetanide: 25 mL    Heparin Infusion: 44 mL    Norepinephrine: 208.3 mL    Propofol: 13.3 mL    Vasopressin: 30 mL  Total IN: 320.6 mL    OUT:    Indwelling Catheter - Urethral (mL): 70 mL  Total OUT: 70 mL    Total NET: 250.6 mL          LABS:                        14.1   32.27 )-----------( 120      ( 29 Feb 2024 12:20 )             44.8     02-29    141  |  98  |  59.1<H>  ----------------------------<  125<H>  5.5<H>   |  19.0<L>  |  2.78<H>    Ca    7.7<L>      29 Feb 2024 12:20  Phos  7.8     02-29  Mg     2.3     02-29    TPro  5.8<L>  /  Alb  3.1<L>  /  TBili  1.1  /  DBili  x   /  AST  1046<H>  /  ALT  564<H>  /  AlkPhos  103  02-29          CAPILLARY BLOOD GLUCOSE      POCT Blood Glucose.: 120 mg/dL (29 Feb 2024 13:57)    PT/INR - ( 29 Feb 2024 05:40 )   PT: 16.9 sec;   INR: 1.54 ratio         PTT - ( 29 Feb 2024 05:40 )  PTT:190.5 sec  Urinalysis Basic - ( 29 Feb 2024 12:20 )    Color: x / Appearance: x / SG: x / pH: x  Gluc: 125 mg/dL / Ketone: x  / Bili: x / Urobili: x   Blood: x / Protein: x / Nitrite: x   Leuk Esterase: x / RBC: x / WBC x   Sq Epi: x / Non Sq Epi: x / Bacteria: x      CULTURES:  Culture Results:   No growth (02-23 @ 06:00)  Culture Results:   No growth at 5 days (02-23 @ 02:40)  Culture Results:   No growth at 5 days (02-23 @ 02:35)      Physical Examination  General: Well-groomed, well fed; In NAD  NEURO: A&Ox4, non-focal exam; full strength in all extremities  HEENT: +PERRLA, EOMI; nares patent bilaterally; mucous membranes pink, moist, and intact, uvula midline; neck supple with no JVD, no lymphadenopathy, trachea midline  PULM: Clear to auscultation bilaterally, no adventitious lung sounds noted; no significant sputum production  CV: S1S2, regular rate and rhythm; no murmurs, rubs, or gallops; +pulses throughout  ABD: Soft, nondistended, nontender, normoactive bowel sounds, no masses  EXT: No edema, clubbing or cyanosis; FROM in all extremities  SKIN: Warm and well perfused, no rashes, lesions, or wounds noted.      DEVICES:     RADIOLOGY:   Patient is a 83y old  Female who presents with a chief complaint of Cellulitis, CHF (29 Feb 2024 13:30)    BRIEF HOSPITAL COURSE: 83F with a PMHx of HFrEF, CAD S/P CABG, pHTN S/P MVR, S/P TAVR, COPD on intermittent 4L NC, HTN, and DM2. Pt. was admitted on 2/23 with complaints of worsening bilateral lower extremity edema that has become painful. She also reported an increased requirement in her home O2. She has been treated on the medicine service for acute on chronic CHF, lower extremity cellulitis, and MARGARITA. on 2/28 pt. was a code blue after developing hypoxemia and subsequent asystolic cardiac arrest. ROSC was achieved in about 10 minutes. Subsequent profound shock state after code requiring triple IV vasopressor therapy with a lactate 11.9. Pt. admitted to MICU for further management.     Events last 24 hours: Weaning sedation and pressor support as tolerated. S/P CT head/chest/abdomen/pelvis demonstrating pulmonary edema with bilateral effusions and RLL pneumonia.    PAST MEDICAL & SURGICAL HISTORY:  CAD  Congestive heart failure  HTN  Borderline diabetes  COPD without exacerbation  Mitral valve repair  TAVR  CABG    Review of Systems:  CHELI, intubated and sedated    Medications:  meropenem Injectable 500 milliGRAM(s) IV Push every 12 hours  meropenem Injectable      buMETAnide Infusion 1 mG/Hr IV Continuous <Continuous>  norepinephrine Infusion 0.05 MICROgram(s)/kG/Min IV Continuous <Continuous>  ipratropium    for Nebulization 500 MICROGram(s) Nebulizer every 6 hours  levalbuterol Inhalation 0.63 milliGRAM(s) Inhalation every 6 hours  propofol Infusion 5 MICROgram(s)/kG/Min IV Continuous <Continuous>  aspirin  chewable 81 milliGRAM(s) Oral daily  heparin   Injectable 5000 Unit(s) SubCutaneous every 12 hours  pantoprazole  Injectable 40 milliGRAM(s) IV Push daily  dextrose 50% Injectable 12.5 Gram(s) IV Push once  dextrose 50% Injectable 25 Gram(s) IV Push once  dextrose 50% Injectable 25 Gram(s) IV Push once  dextrose Oral Gel 15 Gram(s) Oral once PRN  glucagon  Injectable 1 milliGRAM(s) IntraMuscular once  hydrocortisone sodium succinate Injectable 100 milliGRAM(s) IV Push every 8 hours  insulin lispro (ADMELOG) corrective regimen sliding scale   SubCutaneous every 6 hours  vasopressin Infusion 0.04 Unit(s)/Min IV Continuous <Continuous>  dextrose 5%. 1000 milliLiter(s) IV Continuous <Continuous>  dextrose 5%. 1000 milliLiter(s) IV Continuous <Continuous>  sodium bicarbonate  Infusion 0.152 mEq/kG/Hr IV Continuous <Continuous>  chlorhexidine 0.12% Liquid 15 milliLiter(s) Oral Mucosa every 12 hours    Ventilator  Mode: AC/ CMV (Assist Control/ Continuous Mandatory Ventilation)  RR (machine): 22  TV (machine): 500  FiO2: 80  PEEP: 6  ITime: 0.8  MAP: 11  PIP: 30    ICU Vital Signs Last 24 Hrs  T(C): 37.7 (29 Feb 2024 13:00), Max: 38.6 (29 Feb 2024 08:00)  T(F): 99.9 (29 Feb 2024 13:00), Max: 101.5 (29 Feb 2024 08:00)  HR: 98 (29 Feb 2024 13:00) (84 - 145)  BP: 93/46 (29 Feb 2024 05:00) (86/60 - 135/62)  BP(mean): 61 (29 Feb 2024 05:00) (61 - 79)  ABP: 111/47 (29 Feb 2024 13:00) (108/44 - 166/65)  ABP(mean): 68 (29 Feb 2024 13:00) (61 - 97)  RR: 25 (29 Feb 2024 13:00) (10 - 41)  SpO2: 95% (29 Feb 2024 13:00) (92% - 100%)  O2 Parameters below as of 29 Feb 2024 12:00  Patient On (Oxygen Delivery Method): ventilator  O2 Concentration (%): 80    ABG - ( 29 Feb 2024 10:43 )  pH, Arterial: 7.280 pH, Blood: x     /  pCO2: 47    /  pO2: 174   / HCO3: 22    / Base Excess: -4.6  /  SaO2: 100.0     I&O's Detail  28 Feb 2024 07:01  -  29 Feb 2024 07:00  --------------------------------------------------------  IN:    Bumetanide: 25 mL    Heparin Infusion: 71.5 mL    Norepinephrine: 481.7 mL    Propofol: 59.4 mL    Vasopressin: 48 mL  Total IN: 685.6 mL  OUT:    Indwelling Catheter - Urethral (mL): 15 mL    Voided (mL): 700 mL  Total OUT: 715 mL  Total NET: -29.4 mL    29 Feb 2024 07:01  -  29 Feb 2024 14:12  --------------------------------------------------------  IN:    Bumetanide: 25 mL    Heparin Infusion: 44 mL    Norepinephrine: 208.3 mL    Propofol: 13.3 mL    Vasopressin: 30 mL  Total IN: 320.6 mL  OUT:    Indwelling Catheter - Urethral (mL): 70 mL  Total OUT: 70 mL  Total NET: 250.6 mL    LABS:             14.1   32.27 )-----------( 120      ( 29 Feb 2024 12:20 )             44.8     02-29  141  |  98  |  59.1<H>  ----------------------------<  125<H>  5.5<H>   |  19.0<L>  |  2.78<H>    Ca    7.7<L>      29 Feb 2024 12:20  Phos  7.8     02-29  Mg     2.3     02-29  TPro  5.8<L>  /  Alb  3.1<L>  /  TBili  1.1  /  DBili  x   /  AST  1046<H>  /  ALT  564<H>  /  AlkPhos  103  02-29    CAPILLARY BLOOD GLUCOSE  POCT Blood Glucose.: 120 mg/dL (29 Feb 2024 13:57)    PT/INR - ( 29 Feb 2024 05:40 )   PT: 16.9 sec;   INR: 1.54 ratio    PTT - ( 29 Feb 2024 05:40 )  PTT:190.5 sec    Urinalysis Basic - ( 29 Feb 2024 12:20 )  Color: x / Appearance: x / SG: x / pH: x  Gluc: 125 mg/dL / Ketone: x  / Bili: x / Urobili: x   Blood: x / Protein: x / Nitrite: x   Leuk Esterase: x / RBC: x / WBC x   Sq Epi: x / Non Sq Epi: x / Bacteria: x    CULTURES:  Culture Results:   No growth (02-23 @ 06:00)  Culture Results:   No growth at 5 days (02-23 @ 02:40)  Culture Results:   No growth at 5 days (02-23 @ 02:35)    Physical Examination  General: Well-groomed, well fed, frail elderly female  NEURO: Sedated, responsive to painful stimuli  HEENT: +PERRLA; nares patent bilaterally; mucous membranes pink, moist, and intact; neck supple with no JVD, no lymphadenopathy, trachea midline  PULM: Coarse lung sounds on auscultation bilaterally, no wheezing noted; no significant sputum production  CV: S1S2, tachycardic, regular rate and rhythm; no murmurs, rubs, or gallops; +pulses throughout  ABD: Soft, nondistended, nontender, normoactive bowel sounds, no masses  EXT: No edema, clubbing or cyanosis; FROM in all extremities  SKIN: +Cellulitis of bilateral lower extremities; skin hard and wrinkled; cool extremities    DEVICES:  ETT  Villeda  Arterial line  Right femoral TLC CVC    RADIOLOGY:  from: 12 Lead ECG (02.29.24 @ 08:04)  Ventricular Rate 106 BPM  Atrial Rate 106 BPM  P-R Interval 168 ms  QRS Duration 94 ms  Q-T Interval 334 ms  QTC Calculation(Bazett) 443 ms  P Axis 79 degrees  R Axis 133 degrees  T Axis 157 degrees  Diagnosis Line Sinus tachycardia with Fusion complexes  *** Suspect arm lead reversal, interpretation assumes no reversal  Incomplete right bundle branch block  Possible Right ventricular hypertrophy  Nonspecific ST and T wave abnormality Inferior leads  Abnormal ECG  Confirmed by BLAKE JENSEN ([id number]) on 2/29/2024 9:31:02 AM  < end of copied text >    from: CT Head No Cont (02.29.24 @ 08:40)  ACC: [id number] EXAM:  CT BRAIN   ORDERED BY: DORETHA ARROYO   PROCEDURE DATE:  02/29/2024    INTERPRETATION:  CT HEAD  CLINICAL HISTORY: post cardiac arrest  TECHNIQUE:  Noncontrast CT.  Axial Acquisition.  Sagittal and coronal reformations.  COMPARISON:  No prior studies for comparison  FINDINGS:  Exam is limited by artifact from overlying EEG leads.  HEMORRHAGE:  No evidence of intracranial hemorrhage.  BRAIN PARENCHYMA:  Mild atrophy. Moderate patchy white matter ischemic  changes within the cerebral hemispheres. With a chronic appearing lacunar   infarct involving the genu of the left internal capsule. Patchy ischemic   changes and chronic appearing small infarcts involving the bilateral   cerebellar hemispheres..  No mass or mass effect.  VENTRICLES / SHIFT:  No hydrocephalus. No midline shift.  EXTRA-AXIAL / BASAL CISTERNS:  No extra-axial mass. Basal cisterns   preserved.  CALVARIUM AND EXTRACRANIAL SOFT TISSUES:  No depressed calvarial fracture.  SINUSES, ORBITS, MASTOIDS:  The visualized paranasal sinuses and mastoid   air cells are well aerated.  There is marked dilatation and tortuosity of the superior ophthalmic   veins. Further imaging/evaluation when patient's condition permits.  IMPRESSION:  Limited by artifact from overlying EEG leads.  No evidence of an acute intracranial hemorrhage, midline shift, or   hydrocephalus.  Patchy ischemic changes and chronic appearing small infarcts as described.  Interval follow-up if anoxic brain injury remains of clinical concern.  Marked dilatation and tortuosity of the superior ophthalmic veins   bilaterally. Further imaging/evaluation when patient's condition permits.  --- End of Report ---  LISA VILLATORO MD; Attending Radiologist  Thisdocument has been electronically signed. Feb 29 2024 11:33AM  < end of copied text >    from: CT Abdomen and Pelvis No Cont (02.29.24 @ 08:40)  ACC: [id number] EXAM:  CT ABDOMEN AND PELVIS   ORDERED BY: DORETHA ARROYO   ACC: [id number] EXAM:  CT CHEST   ORDERED BY: DORETHA ARROYO   PROCEDURE DATE:  02/29/2024    INTERPRETATION:  CLINICAL INFORMATION: Heart failure, chronic kidney   disease, cardiac arrest, abnormal chest x-ray  COMPARISON: Same day chest x-ray, chest CT 7/3/2022  CONTRAST/COMPLICATIONS:  IV Contrast: NONE  Oral Contrast: NONE  Complications: None reported at time of study completion  PROCEDURE:  CT of the Chest, Abdomen and Pelvis was performed.  Sagittal and coronal reformats were performed.  FINDINGS:  CHEST:  LUNGS AND LARGE AIRWAYS: The tip of the endotracheal tube is above the   raiza. The central airways are patent. Mild pulmonary edema. Patchy   right lower lobe consolidation may represent superimposed pneumonia.  PLEURA: Small right and trace left pleural effusion.  VESSELS: Aortic atherosclerosis without aneurysm.  HEART: Aortic valve replacement. Mild cardiomegaly. No pericardial   effusion. Coronary and mitral annular calcification.  MEDIASTINUM AND DANA: No adenopathy.  CHEST WALL AND LOWER NECK: No masses.  ABDOMEN AND PELVIS:  LIVER: Normal.  BILE DUCTS: Nondilated.  GALLBLADDER: Gallstones.  SPLEEN: Normal.  PANCREAS: Diffuse atrophy.  ADRENALS: Mild bilateral thickening.  KIDNEYS/URETERS: No hydronephrosis or urinary tract calculi.  BLADDER: Collapsed around a Villeda catheter balloon.  REPRODUCTIVE ORGANS: No masses.  BOWEL: The NG tube is in the stomach. No bowel obstruction. Rectal probe   in place.  PERITONEUM: Trace ascites. No free air or collection.  VESSELS: Aortoiliac atherosclerosis without aneurysm. Right femoral   arterial and venous lines.  RETROPERITONEUM/LYMPH NODES: No adenopathy or hematoma.  ABDOMINAL WALL: Mild bilateral flank edema.  BONES: Bilateral anterior rib fractures, likely from chest compressions.  IMPRESSION:  *  Pulmonary edema and small bilateral pleural effusions.  *  Superimposed right lower lobe pneumonia.  *  No acute abdominal pathology.  --- End of Report ---  BRYANNA HOWELL MD; Attending Radiologist  This document has been electronically signed. Feb 29 2024  9:05AM  < end of copied text >

## 2024-02-29 NOTE — CONSULT NOTE ADULT - ASSESSMENT
83F with HTN, DM, Chronic Obstructive Pulmonary Disease on oxygen intermittently, CAD/CABG, MCR, TAVR, admitted with leg swelling, being treated for decompensated heart failure, course complicated by code blue/cardiac arrest, now with Multiple pressor shock, ventilator dependence, concerns for anoxic injury, found to have some brain infarcts.     # shock  - cardiology following  - dual pressors at this time  - wean pressors as toelrated    # decompensaterd ehart failure  - on bumex drip  - management per ehart failure/ cardio/ Intensive care unit    # ventilator dpendence  - poor mental status precludes proper weaning    # s/p cardiac arrest, concerns for anoxia  - CT head as above, infarcts  - will see how she does off propofol     # Encounter for palliative care  - primary team address, goals are full code for now, if she does not wake up in the next 24-48 hours, will readdress

## 2024-02-29 NOTE — PHARMACOTHERAPY INTERVENTION NOTE - COMMENTS
As per cardio, no indication for heparin drip, discontinued and started prophylactic dosing, provider in agreement.
CRRT

## 2024-02-29 NOTE — PROGRESS NOTE ADULT - PROBLEM SELECTOR PLAN 2
- TTE this admission showed a decline in her LVEF to 30-35% (per chart notes, prior TTE 8/2023 showed LVEF 40-45%) and severe RV dysfunction.  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 Will defer ischemic w/u to [place] [facility].  - GDMT: on hold due to shock  - Diuretics: agree w bumex gtt @ 1 mg/h. Consider metolazone 10mg x 1 due to poor UO.   - Please document strict I&Os  - Device: Will need GDMT optimization and then repeat TTE to see if she meets criteria for primary prevention ICD (if this is within her GOC given her age).  - Poor prognosis. Consider palliative c/s
- Unknown baseline  - Monitor renal function closely with diuresis  - Consider nephrology c/s ?CKD.

## 2024-02-29 NOTE — PROGRESS NOTE ADULT - SUBJECTIVE AND OBJECTIVE BOX
[facility] - [facility], THE [facility]                                   [address] [place] [address]                                                      PHONE: [phone]                                                         FAX: [phone]  [email or web address]  ---------------------------------------------------------------------------------------------------------------------------------    Overnight events/patient complaints:      NAD intubated on pressor support IV ABx Bumetanide gtt and heparin gtt     No Known Allergies    MEDICATIONS  (STANDING):  albumin human 25% IVPB 100 milliLiter(s) IV Intermittent once  aspirin enteric coated 81 milliGRAM(s) Oral daily  buMETAnide Infusion 1 mG/Hr (5 mL/Hr) IV Continuous <Continuous>  chlorhexidine 0.12% Liquid 15 milliLiter(s) Oral Mucosa every 12 hours  dextrose 5%. 1000 milliLiter(s) (50 mL/Hr) IV Continuous <Continuous>  dextrose 5%. 1000 milliLiter(s) (100 mL/Hr) IV Continuous <Continuous>  dextrose 50% Injectable 12.5 Gram(s) IV Push once  dextrose 50% Injectable 25 Gram(s) IV Push once  dextrose 50% Injectable 25 Gram(s) IV Push once  glucagon  Injectable 1 milliGRAM(s) IntraMuscular once  heparin  Infusion.  Unit(s)/Hr (13 mL/Hr) IV Continuous <Continuous>  hydrocortisone sodium succinate Injectable 100 milliGRAM(s) IV Push every 8 hours  insulin lispro (ADMELOG) corrective regimen sliding scale   SubCutaneous every 6 hours  ipratropium    for Nebulization 500 MICROGram(s) Nebulizer every 6 hours  levalbuterol Inhalation 0.63 milliGRAM(s) Inhalation every 6 hours  meropenem Injectable 500 milliGRAM(s) IV Push every 12 hours  meropenem Injectable      norepinephrine Infusion 0.05 MICROgram(s)/kG/Min (3.47 mL/Hr) IV Continuous <Continuous>  pantoprazole  Injectable 40 milliGRAM(s) IV Push daily  propofol Infusion 5 MICROgram(s)/kG/Min (2.22 mL/Hr) IV Continuous <Continuous>  simvastatin 40 milliGRAM(s) Oral at bedtime  vasopressin Infusion 0.04 Unit(s)/Min (6 mL/Hr) IV Continuous <Continuous>    MEDICATIONS  (PRN):  dextrose Oral Gel 15 Gram(s) Oral once PRN Blood Glucose LESS THAN 70 milliGRAM(s)/deciliter  heparin   Injectable 3000 Unit(s) IV Push every 6 hours PRN For aPTT between 40 - 57  heparin   Injectable 6000 Unit(s) IV Push every 6 hours PRN For aPTT less than 40      Vital Signs Last 24 Hrs  T(C): 38.3 (2024 10:00), Max: 38.6 (2024 08:00)  T(F): 100.9 (2024 10:00), Max: 101.5 (2024 08:00)  HR: 104 (2024 10:00) (70 - 145)  BP: 93/46 (2024 05:00) (86/60 - 135/62)  BP(mean): 61 (2024 05:00) (61 - 79)  RR: 21 (2024 10:00) (10 - 41)  SpO2: 99% (2024 10:00) (92% - 100%)    Parameters below as of 2024 09:18  Patient On (Oxygen Delivery Method): ventilator      ICU Vital Signs Last 24 Hrs  BEBETO DURÁN  I&O's Detail    2024 07:01  -  2024 07:00  --------------------------------------------------------  IN:    Bumetanide: 25 mL    Heparin Infusion: 71.5 mL    Norepinephrine: 481.7 mL    Propofol: 59.4 mL    Vasopressin: 48 mL  Total IN: 685.6 mL    OUT:    Indwelling Catheter - Urethral (mL): 15 mL    Voided (mL): 700 mL  Total OUT: 715 mL    Total NET: -29.4 mL      2024 07:  -  2024 11:07  --------------------------------------------------------  IN:    Bumetanide: 15 mL    Heparin Infusion: 22 mL    Norepinephrine: 138.9 mL    Propofol: 13.3 mL    Vasopressin: 18 mL  Total IN: 207.2 mL    OUT:    Indwelling Catheter - Urethral (mL): 35 mL  Total OUT: 35 mL    Total NET: 172.2 mL        I&O's Summary    2024 07:  -  2024 07:00  --------------------------------------------------------  IN: 685.6 mL / OUT: 715 mL / NET: -29.4 mL    2024 07:  -  2024 11:07  --------------------------------------------------------  IN: 207.2 mL / OUT: 35 mL / NET: 172.2 mL      Drug Dosing Weight  BEBETO DURÁN  Mode: AC/ CMV (Assist Control/ Continuous Mandatory Ventilation), RR (machine): 22, TV (machine): 500, FiO2: 80, PEEP: 6, ITime: 0.8, MAP: 12, PIP: 31    PHYSICAL EXAM:  General: Appears well developed, alert and cooperative.  HEENT: Head; normocephalic, atraumatic.  Eyes: Pupils reactive, cornea wnl.  Neck: Supple, no nodes adenopathy, no NVD or carotid bruit or thyromegaly.  CARDIOVASCULAR: Normal S1 and S2, 1/6 murmur, rub, gallop or lift.   LUNGS: Decrease BS at the lower bases   ABDOMEN: Soft, nontender without mass or organomegaly. bowel sounds normoactive.  EXTREMITIES: No clubbing, cyanosis or edema. Distal pulses wnl.   SKIN: warm and dry with normal turgor.  NEURO: sedated         LABS:                        14.7   31.45 )-----------( 131      ( 2024 05:40 )             46.6         142  |  99  |  52.8<H>  ----------------------------<  118<H>  5.3   |  23.0  |  2.37<H>    Ca    7.7<L>      2024 05:40  Phos  8.1       Mg     2.8         TPro  5.8<L>  /  Alb  3.1<L>  /  TBili  0.4  /  DBili  x   /  AST  140<H>  /  ALT  72<H>  /  AlkPhos  107      BEBETO DURÁN      PT/INR - ( 2024 05:40 )   PT: 16.9 sec;   INR: 1.54 ratio         PTT - ( 2024 05:40 )  PTT:190.5 sec  Urinalysis Basic - ( 2024 07:30 )    Color: Dark Yellow / Appearance: Turbid / S.018 / pH: x  Gluc: x / Ketone: Trace mg/dL  / Bili: Small / Urobili: 1.0 mg/dL   Blood: x / Protein: 300 mg/dL / Nitrite: Negative   Leuk Esterase: Trace / RBC: 199 /HPF / WBC 14 /HPF   Sq Epi: x / Non Sq Epi: 5 /HPF / Bacteria: Many /HPF        RADIOLOGY & ADDITIONAL STUDIES:    INTERPRETATION OF TELEMETRY (personally reviewed):    < from: 12 Lead ECG (24 @ 08:04) >  Diagnosis Line Sinus tachycardia with Fusion complexes  *** Suspect arm lead reversal, interpretation assumes no reversal  Incomplete right bundle branch block  Possible Right ventricular hypertrophy  Nonspecific ST and T wave abnormality Inferior leads  Abnormal ECG    Confirmed by BLAKE JENSEN ([id number]) on 2024 9:31:02 AM    < end of copied text >      < from: CT Chest No Cont (24 @ 08:39) >    FINDINGS:  CHEST:  LUNGS AND LARGE AIRWAYS: The tip of the endotracheal tube is above the   raiza. The central airways are patent. Mild pulmonary edema. Patchy   right lower lobe consolidation may represent superimposed pneumonia.  PLEURA: Small right and trace left pleural effusion.  VESSELS: Aortic atherosclerosis without aneurysm.  HEART: Aortic valve replacement. Mild cardiomegaly. No pericardial   effusion. Coronary and mitral annular calcification.  MEDIASTINUM AND DANA: No adenopathy.  CHEST WALL AND LOWER NECK: No masses.    ABDOMEN AND PELVIS:  LIVER: Normal.  BILE DUCTS: Nondilated.  GALLBLADDER: Gallstones.  SPLEEN: Normal.  PANCREAS: Diffuse atrophy.  ADRENALS: Mild bilateral thickening.  KIDNEYS/URETERS: No hydronephrosis or urinary tract calculi.    BLADDER: Collapsed around a Villeda catheter balloon.  REPRODUCTIVE ORGANS: No masses.    BOWEL: The NG tube is in the stomach. No bowel obstruction. Rectal probe   in place.  PERITONEUM: Trace ascites. No free air or collection.  VESSELS: Aortoiliac atherosclerosis without aneurysm. Right femoral   arterial and venous lines.  RETROPERITONEUM/LYMPH NODES: No adenopathy or hematoma.  ABDOMINAL WALL: Mild bilateral flank edema.  BONES: Bilateral anterior rib fractures, likely from chest compressions.    IMPRESSION:  *  Pulmonary edema and small bilateral pleural effusions.  *  Superimposed right lower lobe pneumonia.  *  No acute abdominal pathology.    < end of copied text >        < from: TTE W or WO Ultrasound Enhancing Agent (24 @ 11:03) >     CONCLUSIONS:      1. The interventricular septum is flattened in systole and diastole consistent with right ventricular pressure and volume overload. Left ventricular systolic function is moderately to severely decreased with an ejection fraction of 31 % by Jamil's method of disks with an ejection fraction visually estimated at 30 to 35 %.   2. No LV thrombus with intravenous echocontrast.   3. There is mild (grade 1) left ventricular diastolic dysfunction.   4. Moderately enlarged right ventricular cavity size and severely reduced systolic function.   5. Estimated pulmonary artery systolic pressure is 86 mmHg, consistent with severe pulmonary hypertension.   6. The left atrium is mildly dilated.   7. The right atrium is moderately dilated.   8. Moderate tricuspid regurgitation.   9. Mitral valve annuloplasty with mild residual regurgitation.  10. TAVR bioprosthesis with normal function, mean gradient 5 mmHg, trace paravalvular regurgitation.  11. Mild pulmonic regurgitation.  12. The inferior vena cava is dilated measuring 2.4 cm in diameter,  13. No pericardial effusion seen.  14. No prior echocardiogram is available for comparison.    < end of copied text >      ASSESSMENT AND PLAN:  In summary, BEBETO DURÁN is an 83y Female with past medical history significant for COPD CAD s/p 5v CABG, AS s/p TAVR (), mitral valve annuloplasty, HFmrEF CKD who presents with left leg pain. Patient states that her leg has been weeping and that she has been having worsening swelling. She states that this has been happening for about a month. She used to not wear any oxygen at home however over the past month she has needed to. She notes that she was taking extra water pills to try and get the swelling down however they remained swollen. Patient came to the ER. Patient denies any chest pain, SOB, palpitations, near syncope, or syncope    Admitted for acute on chronic HFrEF     TTE 2024 EF 35% severe PHTN 86 mmHg TAVR mean gradient 5 mmHg     Events last 24 hours: Code Blue overhead as patient was reported to have developed hypoxemia and subsequent asystolic cardiac arrest. CPR/ACLS begun immediately with ROSC achieved in approx 10 mins. Subsequent profound shock state requiring IV vasopressor therapy. Lactate 11.9. Patient intubated on pressor support IV ABx Bumetanide gtt and heparin gtt    + trop demand and not C/O with ACS       CT of the ABD/PEL and chest showed     IMPRESSION:  *  Pulmonary edema and small bilateral pleural effusions.  *  Superimposed right lower lobe pneumonia.  *  No acute abdominal pathology.  No pericardial effusion noted         Plan  Agree with Bumex gtt and monitor renal panel    No cardiac indication for Heparin gtt at this time   Wean off presser support is possible   Abx as per MICU     Poor prognosis     Care as per ICU team

## 2024-02-29 NOTE — PROGRESS NOTE ADULT - ASSESSMENT
ASSESSMENT  83F with a PMHx of HFrEF, CAD S/P CABG, pHTN S/P MVR, S/P TAVR, COPD on intermittent 4L NC, HTN, and DM2. Pt. was admitted on 2/23 with complaints of worsening bilateral lower extremity edema that has become painful. She also reported an increased requirement in her home O2. She has been treated on the medicine service for acute on chronic CHF, lower extremity cellulitis, and MARGARITA. on 2/28 pt. was a code blue after developing hypoxemia and subsequent asystolic cardiac arrest. ROSC was achieved in about 10 minutes. Subsequent profound shock state after code requiring triple IV vasopressor therapy with a lactate 11.9. Pt. admitted to MICU for further management.     PROBLEM LIST  # Cardiac arrest  # Acute hypoxemic/hypercapnic respiratory failure  # Undifferentiated shock  # Acute systolic heart failure  # MARGARITA  # Lower extremity cellulitis    Neuro: Continue to wean Propofol as tolerated. Assess pain and neuro status q4h.  Pulm: Currently tolerating ventilator settings, SBT as tolerated. Maintain airway. Keep SpO2 >92%. C/W ipratropium and levalbuterol treatments. Pulmonary toileting.  CV: Goal MAP 65-70, titrated levophed gtt as per orders and C/W vasopressin gtt. Keep K >4 and Mag >2, monitor with labs and replete as needed.  GI: No active issues. NPO, C/W Protonix.  Renal: Maintain Villeda for strict I&O's. C/W bicarb gtt for acidosis. On Bumex gtt with no response, nephrology consulted and recommending CVVH, pt. will need dialysis catheter. Continue to monitor renal function and electrolytes with labs.  ID: Leukocytosis and febrile. Pt. started on empiric Meropenem, pancultures pending. CT chest demonstrating RLL PNA. Continue to monitor WBC and fevers.  Heme: C/W ASA, Heparin for DVT prophylaxis. H/H stable at this time, continue to trend with CBC.  Endo: FS q6h while NPO, maintain euglycemia. C/W hydrocortisone.    Case discussed with MICU Attending: Dr. Davis Mcmullen, NP Student  [facility] ASSESSMENT  83F with a PMHx of HFrEF, CAD S/P CABG, pHTN S/P MVR, S/P TAVR, COPD on intermittent 4L NC, HTN, and DM2. Pt. was admitted on 2/23 with complaints of worsening bilateral lower extremity edema that has become painful. She also reported an increased requirement in her home O2. She has been treated on the medicine service for acute on chronic CHF, lower extremity cellulitis, and MARGARITA. on 2/28 pt. was a code blue after developing hypoxemia and subsequent asystolic cardiac arrest. ROSC was achieved in about 10 minutes. Subsequent profound shock state after code requiring triple IV vasopressor therapy with a lactate 11.9. Pt. admitted to MICU for further management.     PROBLEM LIST  # Cardiac arrest  # Acute hypoxemic/hypercapnic respiratory failure  # Undifferentiated shock  # Acute systolic heart failure  # MARGARITA  # Lower extremity cellulitis    Neuro: Continue to wean Propofol as tolerated. Assess pain and neuro status q4h.  Pulm: Currently tolerating ventilator settings, SBT as tolerated. Maintain airway. Keep SpO2 >92%. C/W ipratropium and levalbuterol treatments. Pulmonary toileting.  CV: Goal MAP 65-70, titrated levophed gtt as per orders and C/W vasopressin gtt. Keep K >4 and Mag >2, monitor with labs and replete as needed. Repeat echo S/P cardiac arrest pending. Bedside POCUS demonstrating enlarged RV, indicative of RV failure vs. biventricular failure. Depending on response to levophed gtt, may need to initiate dobutamine gtt. Heart failure following, recommendations appreciated.  GI: No active issues. NPO, C/W Protonix.  Renal: Maintain Villeda for strict I&O's. C/W bicarb gtt for acidosis. On Bumex gtt with no response, nephrology consulted and recommending CVVH, pt. will need dialysis catheter. Continue to monitor renal function and electrolytes with labs.  ID: Leukocytosis and febrile. Pt. started on empiric Meropenem, pancultures pending. CT chest demonstrating RLL PNA. Continue to monitor WBC and fevers.  Heme: C/W ASA, Heparin for DVT prophylaxis. H/H stable at this time, continue to trend with CBC.  Endo: FS q6h while NPO, maintain euglycemia. C/W hydrocortisone.    Case discussed with MICU Attending: Dr. Davis Mcmullen, NP Student  [facility]

## 2024-02-29 NOTE — EEG REPORT - NS EEG TEXT BOX
BEBETO DURÁN [id number]     Study Date: 		02-29-24 02:00-08:00  Duration in hours:  x5    --------------------------------------------------------------------------------------------------  History:  CC/ HPI Patient is a 83y old  Female who presents with a chief complaint of Legs full of water  Cellulitis  CHF (28 Feb 2024 23:51)    MEDICATIONS  (STANDING):  acetaminophen   IVPB .. 1000 milliGRAM(s) IV Intermittent once  aspirin enteric coated 81 milliGRAM(s) Oral daily  buMETAnide Infusion 1 mG/Hr (5 mL/Hr) IV Continuous <Continuous>  chlorhexidine 0.12% Liquid 15 milliLiter(s) Oral Mucosa every 12 hours  dextrose 5%. 1000 milliLiter(s) (50 mL/Hr) IV Continuous <Continuous>  heparin  Infusion.  Unit(s)/Hr (13 mL/Hr) IV Continuous <Continuous>  hydrocortisone sodium succinate Injectable 100 milliGRAM(s) IV Push every 8 hours  insulin lispro (ADMELOG) corrective regimen sliding scale   SubCutaneous every 6 hours  ipratropium    for Nebulization 500 MICROGram(s) Nebulizer every 6 hours  levalbuterol Inhalation 0.63 milliGRAM(s) Inhalation every 6 hours  meropenem Injectable      meropenem Injectable 500 milliGRAM(s) IV Push every 12 hours  norepinephrine Infusion 0.05 MICROgram(s)/kG/Min (3.47 mL/Hr) IV Continuous <Continuous>  propofol Infusion 5 MICROgram(s)/kG/Min (2.22 mL/Hr) IV Continuous <Continuous>  simvastatin 40 milliGRAM(s) Oral at bedtime  vasopressin Infusion 0.04 Unit(s)/Min (6 mL/Hr) IV Continuous <Continuous>    --------------------------------------------------------------------------------------------------  Study Interpretation:    [[[Abbreviation Key:  PDR=alpha rhythm/posterior dominant rhythm. A-P=anterior posterior gradient.  Amplitude: ‘very low’:<20; ‘low’:20-50; ‘medium’:; ‘high’:>200uV.  Persistence for periodic/rhythmic patterns (% of epoch) ‘rare’:<1%; ‘occasional’:1-10%; ‘frequent’:10-50%; ‘abundant’:50-90%; ‘continuous’:>90%.  Persistence for sporadic discharges: ‘rare’:<1/hr; ‘occasional’:1/min-1/hr; ‘frequent’:>1/min; ‘abundant’:>1/10 sec.  GRDA=generalized rhythmic delta activity; FIRDA=frontal intermittent GRDA; LRDA=lateralized rhythmic delta activity; TIRDA=temporal intermittent rhythmic delta activity;  LPD=PLED=lateralized periodic discharges; GPD=generalized periodic discharges; BiPDs=BiPLEDs=bilateral independent periodic epileptiform discharges; SIRPID=stimulus induced rhythmic, periodic, or ictal appearing discharges; BIRDs=brief potentially ictal rhythmic discharges >4 Hz, lasting .5-10s; PFA=paroxysmal bursts of beta/gamma; LVFA=low voltage fast activity.  Modifiers: +F=with fast component; +S=with spike component; +R=with rhythmic component.  S-B=burst suppression pattern.  Max=maximal. N1-drowsy; N2-stage II sleep; N3-slow wave sleep. SSS/BETS=small sharp spikes/benign epileptiform transients of sleep. HV=hyperventilation; PS=photic stimulation]]]    FINDINGS:      Background:  Continuity: continuous  Symmetry: symmetric  PDR: 6 Hz activity, with amplitude to 30 uV, that attenuated to eye opening.    Reactivity: present  Voltage: normal (between 20-150uV)  Anterior Posterior Gradient: present intermittently  Other background findings: none  Breach: absent    Background Slowing:  Generalized slowing: diffuse irregular delta and theta activity.  Focal slowing: right posterior quadrant delta    State Changes:   -Drowsiness noted with increased slowing, attenuation of fast activity, vertex transients.  -Present with N2 sleep transients with symmetric spindles and K-complexes.    Sporadic Epileptiform Discharges:    None    Rhythmic and Periodic Patterns (RPPs):  None     Electrographic and Electroclinical seizures:  None    Other Clinical Events:  None    Activation Procedures:   -Hyperventilation was not performed.    -Photic stimulation was not performed.     Artifacts:  Intermittent myogenic and movement artifacts were noted.    ECG:  The heart rate on single channel ECG was predominantly between 80-100BPM with ectopy noted.    EEG Classification / Summary:  Abnormal EEG study  Focal slowing: right posterior quadrant delta  Moderate generalized background slowing  -----------------------------------------------------------------------------------------------------    Clinical Impression:  Right posterior quadrant focal cerebral dysfunction noted.  Correlate clinically/radiographically.  Moderate diffuse/multi-focal cerebral dysfunction, not specific as to etiology.  There were no epileptiform abnormalities recorded.    ECG with frequent ectopy.    -------------------------------------------------------------------------------------------------------  [facility] EEG Reading Room Ph#: [phone]  Epilepsy Answering Service after 5PM and before 8:30AM: Ph#: [phone]    Tray Flor M.D.   of Neurology, [facility] Epilepsy [place]

## 2024-02-29 NOTE — PROGRESS NOTE ADULT - ASSESSMENT
Initial HF c/s: Trina Adan MD    82 y/o F with a h/o HFmrEF (LVEF now declined to 30-35% from 40-45% in 8/2023), CAD, s/p 5v CABG, AS s/p TAVR (2020), MVr, HTN, DM2 (A1c 6.0% 2/24/24), former smoker, and COPD on home O2 (intermittent 4LNC) and ?CKD 2-3, who presented with ADHF. She reports that she stopped taking her Lasix for about 9 days and then started developing increased SOB and LE edema. She denies any recent HF hospitalizations.    On 2/28,  pt developed new onset AF w RVR. Subsequently presented respiratory distress and asystolic cardiac arrest. A code blue was activated. I do not have details about the duration of the code and need for CPR. The pt was emergently intubated, started on pressors and transferred to MICU. She has a femoral central and a-line. Labs are remarkable for lactic acidosis (peak lactate 11), MARGARITA, elevated cardiac enzymes and leukocytosis. Her CxR demonstrates worsening pulm edema. Tele shows that she's back in SR. She is now febrile.      Cardiac Studies:  2/23/24 TTE: LVIDd 4.2cm, LVEF 30-35%, grade I DD, mild LVH (septum 1.4cm, PWT 1.3cm), mod RVE with severe RV dysfunction, TAPSE 1.0cm, mild LAE, mod DUARTE, TAVR with normal function (MG 5 mmHg), trace paravalvular AI, MVr with mild MR, mild KS, severe PH (PASP 86 mmHg, RAP 15 mmHg), IVC dilated.

## 2024-02-29 NOTE — DIETITIAN INITIAL EVALUATION ADULT - ORAL INTAKE PTA/DIET HISTORY
Pt upgraded to ICU s/p code blue cardiac arrest, ROSC achieved. Seen this AM, intubated/sedated with propofol (@ 2.22 ml/hr x 24 hrs will provide ~59 kcals/day). Unable to obtain hx from pt at this time, no family at bedside this AM. Chart reviewed. Pt noted to previously be on triple IV pressors, now ordered for vaso and quinten. Initially admitted with CHF exacerbation, bumex gtt in place. Weight on admission 163 lbs, weight today 159 lbs, weights likely fluctuating with edema/diuresis, continue to monitor trends. H/o DM noted on H&P, a1c 6.0%, BG levels in fairly good control. Pt remains NPO at this time. Previously on Regular diet 2/23-2/28. Last BM today per documentation.

## 2024-02-29 NOTE — PROGRESS NOTE ADULT - SUBJECTIVE AND OBJECTIVE BOX
Subjective:  As per records, pt developed new onset AF w RVR ~9pm. Subsequently presented respiratory distress and asystolic cardiac arrest. A code blue was code. I do not have details about the duration of code and need for cPR. The pt was emergently intubated, started on pressors and transferred to MICU. She has a femoral central and a-line.  Labs remarkable for lactic acidosis (peak lactate 11), MARGARITA, elevated cardiac enzymes. HEr CxR demonstrates worsening pulm edema.   Tele demonstrates sinus tachycardia     Medications:  aspirin enteric coated 81 milliGRAM(s) Oral daily  buMETAnide Infusion 1 mG/Hr IV Continuous <Continuous>  chlorhexidine 0.12% Liquid 15 milliLiter(s) Oral Mucosa every 12 hours  dextrose 5%. 1000 milliLiter(s) IV Continuous <Continuous>  dextrose 5%. 1000 milliLiter(s) IV Continuous <Continuous>  dextrose 50% Injectable 25 Gram(s) IV Push once  dextrose 50% Injectable 25 Gram(s) IV Push once  dextrose 50% Injectable 12.5 Gram(s) IV Push once  dextrose Oral Gel 15 Gram(s) Oral once PRN  glucagon  Injectable 1 milliGRAM(s) IntraMuscular once  heparin   Injectable 6000 Unit(s) IV Push every 6 hours PRN  heparin   Injectable 3000 Unit(s) IV Push every 6 hours PRN  heparin  Infusion.  Unit(s)/Hr IV Continuous <Continuous>  hydrocortisone sodium succinate Injectable 100 milliGRAM(s) IV Push every 8 hours  insulin lispro (ADMELOG) corrective regimen sliding scale   SubCutaneous every 6 hours  ipratropium    for Nebulization 500 MICROGram(s) Nebulizer every 6 hours  levalbuterol Inhalation 0.63 milliGRAM(s) Inhalation every 6 hours  meropenem Injectable      meropenem Injectable 500 milliGRAM(s) IV Push every 12 hours  norepinephrine Infusion 0.05 MICROgram(s)/kG/Min IV Continuous <Continuous>  pantoprazole  Injectable 40 milliGRAM(s) IV Push daily  propofol Infusion 5 MICROgram(s)/kG/Min IV Continuous <Continuous>  simvastatin 40 milliGRAM(s) Oral at bedtime  vasopressin Infusion 0.04 Unit(s)/Min IV Continuous <Continuous>    Vitals:  T(C): 38.3 (24 @ 10:00), Max: 38.6 (24 @ 08:00)  HR: 104 (24 @ 10:00) (70 - 145)  BP: 93/46 (24 @ 05:00) (86/60 - 135/62)  BP(mean): 61 (24 @ 05:00) (61 - 79)  RR: 21 (24 @ 10:00) (10 - 41)  SpO2: 99% (24 @ 10:00) (92% - 100%)    Daily     Daily Weight in k.4 (2024 01:00)        I&O's Summary    2024 07:  -  2024 07:00  --------------------------------------------------------  IN: 685.6 mL / OUT: 715 mL / NET: -29.4 mL    2024 07:  -  2024 11:41  --------------------------------------------------------  IN: 207.2 mL / OUT: 35 mL / NET: 172.2 mL        Physical Exam:  Appearance: No Acute Distress  HEENT: EEG on-going  Cardiovascular: tachycardiac heart sounds  Respiratory: decreased BS at the bases R>L  Gastrointestinal: Soft, Non-tender, non-distended	  Skin: no skin lesions  Neurologic: Non-focal  Extremities: No LE edema  Psychiatry: intubated      Labs:                        14.7   31.45 )-----------( 131      ( 2024 05:40 )             46.6         142  |  99  |  52.8<H>  ----------------------------<  118<H>  5.3   |  23.0  |  2.37<H>    Ca    7.7<L>      2024 05:40  Phos  8.1       Mg     2.8         TPro  5.8<L>  /  Alb  3.1<L>  /  TBili  0.4  /  DBili  x   /  AST  140<H>  /  ALT  72<H>  /  AlkPhos  107        PT/INR - ( 2024 05:40 )   PT: 16.9 sec;   INR: 1.54 ratio         PTT - ( 2024 05:40 )  PTT:190.5 sec            Lactate, Blood: 4.3 mmol/L ( @ 05:40)  Lactate, Blood: 11.9 mmol/L ( @ 22:25)

## 2024-02-29 NOTE — PROGRESS NOTE ADULT - ASSESSMENT
82 y/o F with a h/o HFrEF, CAD (s/p CABG), s/p MVR, s/p TAVR, pHTN, COPD (on intermittent 4L NC), CKD, HTN, DM2, with:    # Cardiac arrest  # Acute hypoxemic/hypercapnic respiratory failure  # Mixed distributive/cardiogenic shock  # Acute systolic heart failure  # Acute pulmonary edema  # Lobar pneumonia  # MARGARITA  # Transaminitis  # Lower extremity cellulitis    Long discussion held with the patient's two daughters and son-in-law at the bedside. The focal point of the conversation was that their mother's renal failure has been progressing and while at the moment there is no urgent indication for hemodialysis, if we wish to continue doing everything we can to optimize her chances for recovery then this is something that should be started now as it will primarily assist us with volume offloading. They expressed that the patient would not have wanted to undergo most of these aggressive measures in the first place, especially the invasive procedures, and given her overall poor prognosis they have elected to refuse hemodialysis. Otherwise they wish to continue with aggressive medical care. Of note, they are sure that Camille would not want a tracheostomy or any sort of prolonged mechanical ventilation or life support therapy.    - actively titrating norepinephrine infusion to maintain a MAP > 65  - maintain fixed dose vasopressin  - weaned off phenylephrine gtt  - dobutamine added @ 2.5 mcg/kg/min  - continue stress dose hydrocortisone  - continue bumetanide infusion for now, UOP approx 20cc/hr, HD/UF not within goals of care  - MARGARITA is worsening,  trend BUN/Cr, electrolytes, and acid-base balance  - repeat ABG shows improving metabolic acidosis, discontinue bicarbonate infusion  - actively titrating ventilator settings to maintain SpO2 > 92%, FiO2 reduced to 50%, PEEP @ 6  - CT chest suggestive of pulmonary edema + RLL pneumonia  - blood and sputum cultures are pending  - continue empiric meropenem for now  - start heparin infusion  - propofol switched to dexmedetomidine infusion for light sedation  - should be able to discontinue cEEG monitoring tomorrow given improvement in mental status      Case discussed with MICU physician, Dr. Cortés.

## 2024-02-29 NOTE — DIETITIAN INITIAL EVALUATION ADULT - PERTINENT MEDS FT
MEDICATIONS  (STANDING):  buMETAnide Infusion 1 mG/Hr (5 mL/Hr) IV Continuous <Continuous>  dextrose 5%. 1000 milliLiter(s) (50 mL/Hr) IV Continuous <Continuous>  glucagon  Injectable 1 milliGRAM(s) IntraMuscular once  hydrocortisone sodium succinate Injectable 100 milliGRAM(s) IV Push every 8 hours  insulin lispro (ADMELOG) corrective regimen sliding scale   SubCutaneous every 6 hours  meropenem Injectable 500 milliGRAM(s) IV Push every 12 hours    norepinephrine Infusion 0.05 MICROgram(s)/kG/Min (3.47 mL/Hr) IV Continuous <Continuous>  propofol Infusion 5 MICROgram(s)/kG/Min (2.22 mL/Hr) IV Continuous <Continuous>  vasopressin Infusion 0.04 Unit(s)/Min (6 mL/Hr) IV Continuous <Continuous>

## 2024-02-29 NOTE — PROGRESS NOTE ADULT - PROBLEM SELECTOR PLAN 1
- TTE this admission showed a decline in her LVEF to 30-35% (per chart notes, prior TTE 8/2023 showed LVEF 40-45%) and severe RV dysfunction.  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 Will defer ischemic w/u to [facility].  - Clinically hypervolemic w/ warm extremities.   - GDMT: Continue Toprol XL 50 mg daily. Will add aldactone 25mg daily today. If renal function improves/remains stable will add ARNi/SGLT2i.  - Diuretics: Increase Lasix to 80 mg IV BID  - Please document strict I&Os  - Device: Will need GDMT optimization and then repeat TTE to see if she meets criteria for primary prevention ICD (if this is within her GOC given her age).
cardioresp arrest in setting of hypoxia/asystole  Fevers this am - ?combined septic+cardiogenic shock  Recommend hemodynamic monitoring with central line or PAC to better assess shock and determine need for inotropes.   Vasopressors: vaso 0.04u/min, levo 0.05 mcg/kg/min  Goals: CVP < 12, SVO2 > 65%, lactic acid < 2.2, CI > 2.2, iCa > 1.3  Monitor LFTs, lactate  Please repeat TTE

## 2024-02-29 NOTE — DIETITIAN INITIAL EVALUATION ADULT - PERTINENT LABORATORY DATA
02-29    142  |  99  |  52.8<H>  ----------------------------<  118<H>  5.3   |  23.0  |  2.37<H>    Ca    7.7<L>      29 Feb 2024 05:40  Phos  8.1     02-28  Mg     2.8     02-28    TPro  5.8<L>  /  Alb  3.1<L>  /  TBili  0.4  /  DBili  x   /  AST  140<H>  /  ALT  72<H>  /  AlkPhos  107  02-28  POCT Blood Glucose.: 115 mg/dL (02-29-24 @ 05:46)  A1C with Estimated Average Glucose Result: 6.0 % (02-24-24 @ 04:30)

## 2024-02-29 NOTE — DIETITIAN INITIAL EVALUATION ADULT - ADD RECOMMEND
Monitor for initiation of nutrition when medically feasible  Monitor nutrition related labs, weights and BM trends Monitor for initiation of nutrition when medically feasible  Monitor nutrition related labs, weights and BM trends  Rx: MVI daily via appropriate route

## 2024-02-29 NOTE — PROCEDURE NOTE - ADDITIONAL PROCEDURE DETAILS
# Cardiac arrest  # Acute hypoxemic respiratory failure  # Undifferentiated shock  # Acute systolic heart failure  # MARGARITA    Procedure performed independently of critical care time.
# Cardiac arrest  # Acute hypoxemic respiratory failure  # Undifferentiated shock  # Acute systolic heart failure  # MARGARITA    Procedure performed independently of critical care time.
MICU called for assistance with placement of the catheter. I went to bedside, on exam anatomy altered, urethra and the opening of the vagina appear next to each other. I was able to locate the urethra and place a 16 Prydeinig catheter. With XAVIER Marte And the MICU attending we confirmed placement of the catheter with US, able to visualize balloon surrounded by an underdistended bladder.. Scant urine on insertion, but easily pushed 40cc in and 40cc drained. To note patient was a new admission to the MICU after coding on 4 tower and is in critical condition

## 2024-02-29 NOTE — PROGRESS NOTE ADULT - PROBLEM SELECTOR PLAN 4
- based on RVSP. Would reassess once euvolemic. If RVSP remains elevated will consider RHC.   - On intermittent home O2  - Should f/u with pulmonary as an outpatient  - c/w diuresis as above.

## 2024-02-29 NOTE — CONSULT NOTE ADULT - SUBJECTIVE AND OBJECTIVE BOX
HPI:  83 year old female with Hypertension, Diabetes, COPD on intermittent 4LNC, CHF?rEF, CAD s/p CABG, MVR and TAVR presents with swelling of legs for past 2 months. She states they are full of water and  but have gotten worse over the past 4 days where they've become pain and she cant stand up and move around. Prior to that patient was ambulatory without any assistive devices though ambulation limited to short distances by exertional dyspnea. She has also been supplemental O2 for the past 2 months intermittently but has been using it consistently recently. Denies any chest pain, cough, dizziness, dyspnea, palpitations, PMD or orthopnea but feels weak.     (23 Feb 2024 14:48)    83F with past medical history as listed admitted 2/23 with lower extremity swelling, and weakness, increasing oxygen requirment. Patients course complicated by code blue, profound shock state requiring numerous vasopressors, ventilator dependence, poor mental status.  Palliative consulted for complex medical decision making in the setting of likely life-limiting illness.     PERTINENT PMH REVIEWED: Yes     PAST MEDICAL & SURGICAL HISTORY:  CAD (coronary artery disease)  Congestive heart failure (CHF)  HTN (hypertension)  Borderline diabetes  COPD without exacerbation  S/P MVR (mitral valve repair)  S/P TAVR (transcatheter aortic valve replacement)  S/P CABG (coronary artery bypass graft)    SOCIAL HISTORY:                      Substance history:                    Admitted from:  home  SNF  [facility]                     Religious/spirituality:                    Cultural concerns:                      Surrogate Anabela Calderon , Mary Figueroa     FAMILY HISTORY:  FH: heart disease (Father, Mother)    Allergies    No Known Allergies    ADVANCE DIRECTIVES/TREATMENT PREFERENCES:  Full code     Baseline ADLs (prior to admission):  Independent/ Dependent      Karnofsky/Palliative Performance Status Version 2:  %  http://npcrc.org/files/news/palliative_performance_scale_ppsv2.pdf    Present Symptoms:     Dyspnea: Mild Moderate Severe  Nausea/Vomiting: Yes No  Anxiety:  Yes No  Depression: Yes No  Fatigue: Yes No  Loss of appetite: Yes No  Constipation:     Pain: Yes No            Character-            Duration-            Effect-            Factors-            Frequency-            Location-            Severity-    Pain AD Score:  http://geriatrictoolkit.[facility]/cog/painad.pdf (press ctrl + left click to view)    Review of Systems: Reviewed                     Negative:                     Positive:  Unable to obtain due to poor mentation   All others negative    MEDICATIONS  (STANDING):  aspirin  chewable 81 milliGRAM(s) Oral daily  buMETAnide Infusion 1 mG/Hr (5 mL/Hr) IV Continuous <Continuous>  chlorhexidine 0.12% Liquid 15 milliLiter(s) Oral Mucosa every 12 hours  dextrose 5%. 1000 milliLiter(s) (50 mL/Hr) IV Continuous <Continuous>  dextrose 5%. 1000 milliLiter(s) (100 mL/Hr) IV Continuous <Continuous>  dextrose 50% Injectable 25 Gram(s) IV Push once  dextrose 50% Injectable 12.5 Gram(s) IV Push once  dextrose 50% Injectable 25 Gram(s) IV Push once  glucagon  Injectable 1 milliGRAM(s) IntraMuscular once  heparin   Injectable 5000 Unit(s) SubCutaneous every 12 hours  hydrocortisone sodium succinate Injectable 100 milliGRAM(s) IV Push every 8 hours  insulin lispro (ADMELOG) corrective regimen sliding scale   SubCutaneous every 6 hours  ipratropium    for Nebulization 500 MICROGram(s) Nebulizer every 6 hours  levalbuterol Inhalation 0.63 milliGRAM(s) Inhalation every 6 hours  meropenem Injectable      meropenem Injectable 500 milliGRAM(s) IV Push every 12 hours  norepinephrine Infusion 0.05 MICROgram(s)/kG/Min (3.47 mL/Hr) IV Continuous <Continuous>  pantoprazole  Injectable 40 milliGRAM(s) IV Push daily  propofol Infusion 5 MICROgram(s)/kG/Min (2.22 mL/Hr) IV Continuous <Continuous>  vasopressin Infusion 0.04 Unit(s)/Min (6 mL/Hr) IV Continuous <Continuous>    MEDICATIONS  (PRN):  dextrose Oral Gel 15 Gram(s) Oral once PRN Blood Glucose LESS THAN 70 milliGRAM(s)/deciliter    PHYSICAL EXAM:    Vital Signs Last 24 Hrs  T(C): 37.7 (29 Feb 2024 13:00), Max: 38.6 (29 Feb 2024 08:00)  T(F): 99.9 (29 Feb 2024 13:00), Max: 101.5 (29 Feb 2024 08:00)  HR: 98 (29 Feb 2024 13:00) (84 - 145)  BP: 93/46 (29 Feb 2024 05:00) (86/60 - 135/62)  BP(mean): 61 (29 Feb 2024 05:00) (61 - 79)  RR: 25 (29 Feb 2024 13:00) (10 - 41)  SpO2: 95% (29 Feb 2024 13:00) (92% - 100%)    Parameters below as of 29 Feb 2024 12:00  Patient On (Oxygen Delivery Method): ventilator    O2 Concentration (%): 80    General: unresponsive     HEENT: ET tube    Lungs: comfortable     CV: normal      GI: normal     :  del valle    MSK: weakness      Neuro: unresponsive but on propofol at the time of my evaluation    Skin: no rash    LABS:                        14.1   32.27 )-----------( 120      ( 29 Feb 2024 12:20 )             44.8     02-29    141  |  98  |  59.1<H>  ----------------------------<  125<H>  5.5<H>   |  19.0<L>  |  2.78<H>    Ca    7.7<L>      29 Feb 2024 12:20  Phos  7.8     02-29  Mg     2.3     02-29    TPro  5.8<L>  /  Alb  3.1<L>  /  TBili  1.1  /  DBili  x   /  AST  1046<H>  /  ALT  564<H>  /  AlkPhos  103  02-29    PT/INR - ( 29 Feb 2024 05:40 )   PT: 16.9 sec;   INR: 1.54 ratio         PTT - ( 29 Feb 2024 05:40 )  PTT:190.5 sec  Urinalysis Basic - ( 29 Feb 2024 12:20 )    Color: x / Appearance: x / SG: x / pH: x  Gluc: 125 mg/dL / Ketone: x  / Bili: x / Urobili: x   Blood: x / Protein: x / Nitrite: x   Leuk Esterase: x / RBC: x / WBC x   Sq Epi: x / Non Sq Epi: x / Bacteria: x    I&O's Summary    28 Feb 2024 07:01  -  29 Feb 2024 07:00  --------------------------------------------------------  IN: 685.6 mL / OUT: 715 mL / NET: -29.4 mL    29 Feb 2024 07:01  -  29 Feb 2024 13:34  --------------------------------------------------------  IN: 320.6 mL / OUT: 70 mL / NET: 250.6 mL    RADIOLOGY & ADDITIONAL STUDIES:    < from: CT Abdomen and Pelvis No Cont (02.29.24 @ 08:40) >    IMPRESSION:  *  Pulmonary edema and small bilateral pleural effusions.  *  Superimposed right lower lobe pneumonia.  *  No acute abdominal pathology.      --- End of Report ---      BRYANNADORINA HOWELL MD; Attending Radiologist  This document has been electronically signed. Feb 29 2024  9:05AM    < end of copied text >    < from: CT Head No Cont (02.29.24 @ 08:40) >  IMPRESSION:  Limited by artifact from overlying EEG leads.  No evidence of an acute intracranial hemorrhage, midline shift, or   hydrocephalus.  Patchy ischemic changes and chronic appearing small infarcts as described.  Interval follow-up if anoxic brain injury remains of clinical concern.  Marked dilatation and tortuosity of the superior ophthalmic veins   bilaterally. Further imaging/evaluation when patient's condition permits.    --- End of Report ---    < end of copied text >

## 2024-02-29 NOTE — DIETITIAN INITIAL EVALUATION ADULT - ENTERAL
Controlled Substance Refill Request for Norco  Problem List Complete:  Yes    Last Written Prescription Date:  12/2/17  Last Fill Quantity: 120,   # refills: 0    [facility] Office Visit with [facility] [place] care provider: 11/17/17 with Dr. Fisher    Clinic visit frequency required: Q 3 months     Future Office visit:   Next 5 appointments (look out 90 days)     Jan 16, 2018  2:40 PM CST   (Arrive by 2:25 PM)   Return Visit with Laquita Fernandez MD   [facility] )    [address] [place] [id number] [phone]            Jan 29, 2018  2:20 PM CST   (Arrive by 2:00 PM)   SHORT with Lyle Fisher DO   [facility]    [address] [place] [facility] [address]   [phone]                  Controlled substance agreement on file: Yes:  Date 09/18/15.     Processing:  Staff will hand deliver Rx to on-site pharmacy       When medically feasible, recommend to start Vital 1.5 @ 10 cc/hr and increase by 10 cc until goal rate 55 cc/hr x 18 hrs per ICU protocol (990 ml, 1485 kcals, 67 g pro, 752 ml free water)  Additional free water per medical teams discretion

## 2024-02-29 NOTE — CONSULT NOTE ADULT - SUBJECTIVE AND OBJECTIVE BOX
[facility] DIVISION OF KIDNEY DISEASES AND HYPERTENSION -- INITIAL CONSULT NOTE  --------------------------------------------------------------------------------  HPI:    83 year old female with Hypertension, Diabetes, COPD on intermittent 4LNC, CHF?rEF, CAD s/p CABG, MVR and TAVR presents with swelling of legs for past 2 months, pain and inability to ambulate. She also reported being on supplemental O2 for the past 2 months intermittently but had been using it consistently recently. Pt was admitted for CHf eacerbation and leg cellulitis. Pt developed worsening hypoxemia and subsequent asystolic cardiac arrest on the floor.. CPR/ACLS begun immediately with ROSC achieved in approx 10 mins. Subsequent profound shock state requiring triple IV vasopressor therapy. Lactate 11.9. CXR showed worsening pulmonary edema. She was then transferred to MICU and started on bumex gtt.  Nephrology consulted for oliguric Rene and worsening metabolic acidosis.      PAST HISTORY  --------------------------------------------------------------------------------  PAST MEDICAL & SURGICAL HISTORY:  CAD (coronary artery disease)      Congestive heart failure (CHF)      HTN (hypertension)      Borderline diabetes      COPD without exacerbation      S/P MVR (mitral valve repair)      S/P TAVR (transcatheter aortic valve replacement)      S/P CABG (coronary artery bypass graft)        FAMILY HISTORY:  FH: heart disease (Father, Mother)      PAST SOCIAL HISTORY: lives at home    ALLERGIES & MEDICATIONS  --------------------------------------------------------------------------------  Allergies    No Known Allergies    Intolerances      Standing Inpatient Medications  aspirin  chewable 81 milliGRAM(s) Oral daily  buMETAnide Infusion 1 mG/Hr IV Continuous <Continuous>  chlorhexidine 0.12% Liquid 15 milliLiter(s) Oral Mucosa every 12 hours  dextrose 5%. 1000 milliLiter(s) IV Continuous <Continuous>  dextrose 5%. 1000 milliLiter(s) IV Continuous <Continuous>  dextrose 50% Injectable 25 Gram(s) IV Push once  dextrose 50% Injectable 12.5 Gram(s) IV Push once  dextrose 50% Injectable 25 Gram(s) IV Push once  glucagon  Injectable 1 milliGRAM(s) IntraMuscular once  heparin   Injectable 5000 Unit(s) SubCutaneous every 12 hours  hydrocortisone sodium succinate Injectable 100 milliGRAM(s) IV Push every 8 hours  insulin lispro (ADMELOG) corrective regimen sliding scale   SubCutaneous every 6 hours  ipratropium    for Nebulization 500 MICROGram(s) Nebulizer every 6 hours  levalbuterol Inhalation 0.63 milliGRAM(s) Inhalation every 6 hours  meropenem Injectable      meropenem Injectable 500 milliGRAM(s) IV Push every 12 hours  norepinephrine Infusion 0.05 MICROgram(s)/kG/Min IV Continuous <Continuous>  pantoprazole  Injectable 40 milliGRAM(s) IV Push daily  propofol Infusion 5 MICROgram(s)/kG/Min IV Continuous <Continuous>  sodium bicarbonate  Infusion 0.152 mEq/kG/Hr IV Continuous <Continuous>  vasopressin Infusion 0.04 Unit(s)/Min IV Continuous <Continuous>    PRN Inpatient Medications  dextrose Oral Gel 15 Gram(s) Oral once PRN      REVIEW OF SYSTEMS  unable to obtain    VITALS/PHYSICAL EXAM  --------------------------------------------------------------------------------  T(C): 37.7 (02-29-24 @ 13:00), Max: 38.6 (02-29-24 @ 08:00)  HR: 98 (02-29-24 @ 13:00) (84 - 145)  BP: 93/46 (02-29-24 @ 05:00) (86/60 - 135/62)  RR: 25 (02-29-24 @ 13:00) (10 - 41)  SpO2: 95% (02-29-24 @ 13:00) (92% - 100%)  Wt(kg): --        02-28-24 @ 07:01  -  02-29-24 @ 07:00  --------------------------------------------------------  IN: 685.6 mL / OUT: 715 mL / NET: -29.4 mL    02-29-24 @ 07:01  -  02-29-24 @ 14:03  --------------------------------------------------------  IN: 320.6 mL / OUT: 70 mL / NET: 250.6 mL      Physical Exam:  	    Gen: intubated/ sedated  	HEENT: ett to vent   	Pulm: mechanical bs  	CV: RRR, S1S2; no rub  	Abd: +BS, soft, nontender/nondistended  	: eligio  	UE: Warm, no edema  	LE: Warm, ;  edema+  	Neuro: sedated  	Psych: unable to assess  	Skin: Warm  	Vascular access: none    LABS/STUDIES  --------------------------------------------------------------------------------              14.1   32.27 >-----------<  120      [02-29-24 @ 12:20]              44.8     141  |  98  |  59.1  ----------------------------<  125      [02-29-24 @ 12:20]  5.5   |  19.0  |  2.78        Ca     7.7     [02-29-24 @ 12:20]      Mg     2.3     [02-29-24 @ 12:20]      Phos  7.8     [02-29-24 @ 12:20]    TPro  5.8  /  Alb  3.1  /  TBili  1.1  /  DBili  x   /  AST  1046  /  ALT  564  /  AlkPhos  103  [02-29-24 @ 12:20]    PT/INR: PT 16.9 , INR 1.54       [02-29-24 @ 05:40]  PTT: 190.5      [02-29-24 @ 05:40]      Creatinine Trend:  SCr 2.78 [02-29 @ 12:20]  SCr 2.37 [02-29 @ 05:40]  SCr 1.94 [02-28 @ 22:25]  SCr 1.40 [02-28 @ 06:33]  SCr 1.76 [02-27 @ 04:54]    Urinalysis - [02-29-24 @ 12:20]      Color  / Appearance  / SG  / pH       Gluc 125 / Ketone   / Bili  / Urobili        Blood  / Protein  / Leuk Est  / Nitrite       RBC  / WBC  / Hyaline  / Gran  / Sq Epi  / Non Sq Epi  / Bacteria

## 2024-02-29 NOTE — DIETITIAN INITIAL EVALUATION ADULT - OTHER INFO
84 y/o F with a h/o HFrEF, CAD (s/p CABG), pHTN, s/p MVR, s/p TAVR, COPD (on intermittent 4L NC), CKD, HTN, DM2, admitted on 2/23 with complaints of worsening bilateral lower extremity edema that has become painful. She also reported an increased requirement in her home O2. She has been treated on the medicine service for acute on chronic CHF, lower extremity cellulitis, and MARGARITA on CKD. S/p Code Blue, pt was reported to have developed hypoxemia and subsequent asystolic cardiac arrest. CXR suggestive of worsening pulmonary edema. CPR/ACLS begun immediately with ROSC achieved in approx 10 mins. Subsequent profound shock state requiring triple IV vasopressor therapy. Lactate 11.9. Upgraded to ICU, intubated/sedated.

## 2024-03-01 NOTE — PROGRESS NOTE ADULT - PROVIDER SPECIALTY LIST ADULT
Cardiology
MICU
Palliative Care
Cardiology
Heart Failure
Hospitalist
Cardiology
Critical Care
Hospitalist
MICU
Nephrology
Heart Failure

## 2024-03-01 NOTE — AIRWAY REMOVAL NOTE  ADULT & PEDS - ARTIFICAL AIRWAY REMOVAL COMMENTS
Written order for extubation verified. The patient was identified by full name and birth date compared to the identification band. Present during the procedure was GEOVANY Rogers

## 2024-03-01 NOTE — EEG REPORT - NS EEG TEXT BOX
BEBETO DURÁN [id number]     Study Date: 		02-29-24 08:00-08:00 3/1/24  Duration in hours:  x23    --------------------------------------------------------------------------------------------------  History:  CC/ HPI Patient is a 83y old  Female who presents with a chief complaint of Legs full of water  Cellulitis  CHF (28 Feb 2024 23:51)    MEDICATIONS  (STANDING):  acetaminophen   IVPB .. 1000 milliGRAM(s) IV Intermittent once  aspirin enteric coated 81 milliGRAM(s) Oral daily  buMETAnide Infusion 1 mG/Hr (5 mL/Hr) IV Continuous <Continuous>  chlorhexidine 0.12% Liquid 15 milliLiter(s) Oral Mucosa every 12 hours  dextrose 5%. 1000 milliLiter(s) (50 mL/Hr) IV Continuous <Continuous>  heparin  Infusion.  Unit(s)/Hr (13 mL/Hr) IV Continuous <Continuous>  hydrocortisone sodium succinate Injectable 100 milliGRAM(s) IV Push every 8 hours  insulin lispro (ADMELOG) corrective regimen sliding scale   SubCutaneous every 6 hours  ipratropium    for Nebulization 500 MICROGram(s) Nebulizer every 6 hours  levalbuterol Inhalation 0.63 milliGRAM(s) Inhalation every 6 hours  meropenem Injectable      meropenem Injectable 500 milliGRAM(s) IV Push every 12 hours  norepinephrine Infusion 0.05 MICROgram(s)/kG/Min (3.47 mL/Hr) IV Continuous <Continuous>  propofol Infusion 5 MICROgram(s)/kG/Min (2.22 mL/Hr) IV Continuous <Continuous>  simvastatin 40 milliGRAM(s) Oral at bedtime  vasopressin Infusion 0.04 Unit(s)/Min (6 mL/Hr) IV Continuous <Continuous>    --------------------------------------------------------------------------------------------------  Study Interpretation:    [[[Abbreviation Key:  PDR=alpha rhythm/posterior dominant rhythm. A-P=anterior posterior gradient.  Amplitude: ‘very low’:<20; ‘low’:20-50; ‘medium’:; ‘high’:>200uV.  Persistence for periodic/rhythmic patterns (% of epoch) ‘rare’:<1%; ‘occasional’:1-10%; ‘frequent’:10-50%; ‘abundant’:50-90%; ‘continuous’:>90%.  Persistence for sporadic discharges: ‘rare’:<1/hr; ‘occasional’:1/min-1/hr; ‘frequent’:>1/min; ‘abundant’:>1/10 sec.  GRDA=generalized rhythmic delta activity; FIRDA=frontal intermittent GRDA; LRDA=lateralized rhythmic delta activity; TIRDA=temporal intermittent rhythmic delta activity;  LPD=PLED=lateralized periodic discharges; GPD=generalized periodic discharges; BiPDs=BiPLEDs=bilateral independent periodic epileptiform discharges; SIRPID=stimulus induced rhythmic, periodic, or ictal appearing discharges; BIRDs=brief potentially ictal rhythmic discharges >4 Hz, lasting .5-10s; PFA=paroxysmal bursts of beta/gamma; LVFA=low voltage fast activity.  Modifiers: +F=with fast component; +S=with spike component; +R=with rhythmic component.  S-B=burst suppression pattern.  Max=maximal. N1-drowsy; N2-stage II sleep; N3-slow wave sleep. SSS/BETS=small sharp spikes/benign epileptiform transients of sleep. HV=hyperventilation; PS=photic stimulation]]]    FINDINGS:      Background:  Continuity: continuous  Symmetry: symmetric  PDR: 6.5 Hz activity, with amplitude to 30 uV, that attenuated to eye opening.    Reactivity: present  Voltage: normal (between 20-150uV)  Anterior Posterior Gradient: present intermittently  Other background findings: none  Breach: absent    Background Slowing:  Generalized slowing: diffuse irregular delta and theta activity.  Focal slowing: right posterior quadrant delta    State Changes:   -Drowsiness noted with increased slowing, attenuation of fast activity, vertex transients.  -Present with N2 sleep transients with symmetric spindles and K-complexes.    Sporadic Epileptiform Discharges:    None    Rhythmic and Periodic Patterns (RPPs):  Quasiperiodic GPDs with TPW morphology near 1hz at times with arousal, some variable asynchrony    Electrographic and Electroclinical seizures:  None    Other Clinical Events:  None    Activation Procedures:   -Hyperventilation was not performed.    -Photic stimulation was not performed.     Artifacts:  Intermittent myogenic and movement artifacts were noted.    ECG:  The heart rate on single channel ECG was predominantly between 70-90BPM with ectopy noted.    EEG Classification / Summary:  Abnormal EEG study  Focal slowing: right posterior quadrant delta  Quasiperiodic GPDs with TPW morphology near 1hz at times with arousal  Moderate generalized background slowing    -----------------------------------------------------------------------------------------------------    Clinical Impression:  Right posterior quadrant focal cerebral dysfunction noted.  Correlate clinically/radiographically.  Moderate to severe diffuse/multi-focal cerebral dysfunction, not specific as to etiology.  GPDs with triphasic morphology are typically seen in the context of a relatively severe metabolic encephalopathic process, but may be associated with an increased risk for seizure, (more so typically if frequency >2hz).    No seizures x 1 day.  ECG with ectopy.    -------------------------------------------------------------------------------------------------------  [facility] EEG Reading Room Ph#: [phone]  Epilepsy Answering Service after 5PM and before 8:30AM: Ph#: [phone]    Tray T. Flor, M.D.   of Neurology, [facility] Epilepsy [place]	   BEBETO DURÁN [id number]     Study Date: 		03-01-24 08:00-20:15 3/1/24  Duration in hours:  x12H 15min    --------------------------------------------------------------------------------------------------  History:  CC/ HPI Patient is a 83y old  Female who presents with a chief complaint of Legs full of water  Cellulitis  CHF (28 Feb 2024 23:51)    MEDICATIONS  (STANDING):  acetaminophen   IVPB .. 1000 milliGRAM(s) IV Intermittent once  aspirin enteric coated 81 milliGRAM(s) Oral daily  buMETAnide Infusion 1 mG/Hr (5 mL/Hr) IV Continuous <Continuous>  chlorhexidine 0.12% Liquid 15 milliLiter(s) Oral Mucosa every 12 hours  dextrose 5%. 1000 milliLiter(s) (50 mL/Hr) IV Continuous <Continuous>  heparin  Infusion.  Unit(s)/Hr (13 mL/Hr) IV Continuous <Continuous>  hydrocortisone sodium succinate Injectable 100 milliGRAM(s) IV Push every 8 hours  insulin lispro (ADMELOG) corrective regimen sliding scale   SubCutaneous every 6 hours  ipratropium    for Nebulization 500 MICROGram(s) Nebulizer every 6 hours  levalbuterol Inhalation 0.63 milliGRAM(s) Inhalation every 6 hours  meropenem Injectable      meropenem Injectable 500 milliGRAM(s) IV Push every 12 hours  norepinephrine Infusion 0.05 MICROgram(s)/kG/Min (3.47 mL/Hr) IV Continuous <Continuous>  propofol Infusion 5 MICROgram(s)/kG/Min (2.22 mL/Hr) IV Continuous <Continuous>  simvastatin 40 milliGRAM(s) Oral at bedtime  vasopressin Infusion 0.04 Unit(s)/Min (6 mL/Hr) IV Continuous <Continuous>    --------------------------------------------------------------------------------------------------  Study Interpretation:    [[[Abbreviation Key:  PDR=alpha rhythm/posterior dominant rhythm. A-P=anterior posterior gradient.  Amplitude: ‘very low’:<20; ‘low’:20-50; ‘medium’:; ‘high’:>200uV.  Persistence for periodic/rhythmic patterns (% of epoch) ‘rare’:<1%; ‘occasional’:1-10%; ‘frequent’:10-50%; ‘abundant’:50-90%; ‘continuous’:>90%.  Persistence for sporadic discharges: ‘rare’:<1/hr; ‘occasional’:1/min-1/hr; ‘frequent’:>1/min; ‘abundant’:>1/10 sec.  GRDA=generalized rhythmic delta activity; FIRDA=frontal intermittent GRDA; LRDA=lateralized rhythmic delta activity; TIRDA=temporal intermittent rhythmic delta activity;  LPD=PLED=lateralized periodic discharges; GPD=generalized periodic discharges; BiPDs=BiPLEDs=bilateral independent periodic epileptiform discharges; SIRPID=stimulus induced rhythmic, periodic, or ictal appearing discharges; BIRDs=brief potentially ictal rhythmic discharges >4 Hz, lasting .5-10s; PFA=paroxysmal bursts of beta/gamma; LVFA=low voltage fast activity.  Modifiers: +F=with fast component; +S=with spike component; +R=with rhythmic component.  S-B=burst suppression pattern.  Max=maximal. N1-drowsy; N2-stage II sleep; N3-slow wave sleep. SSS/BETS=small sharp spikes/benign epileptiform transients of sleep. HV=hyperventilation; PS=photic stimulation]]]    FINDINGS:      Background:  Continuity: continuous  Symmetry: symmetric  PDR: 6.5 Hz activity, with amplitude to 30 uV, that attenuated to eye opening.    Reactivity: present  Voltage: normal (between 20-150uV)  Anterior Posterior Gradient: present intermittently  Other background findings: none  Breach: absent    Background Slowing:  Generalized slowing: diffuse irregular delta and theta activity.  Focal slowing: right posterior quadrant delta    State Changes:   -Drowsiness noted with increased slowing, attenuation of fast activity, vertex transients.  -Present with N2 sleep transients with symmetric spindles and K-complexes.    Sporadic Epileptiform Discharges:    None    Rhythmic and Periodic Patterns (RPPs):  Quasiperiodic GPDs with TPW morphology near 1hz at times with arousal, some variable asynchrony    Electrographic and Electroclinical seizures:  None    Other Clinical Events:  None    Activation Procedures:   -Hyperventilation was not performed.    -Photic stimulation was not performed.     Artifacts:  Intermittent myogenic and movement artifacts were noted.    ECG:  The heart rate on single channel ECG was predominantly between 70-90BPM with ectopy noted.    EEG Classification / Summary:  Abnormal EEG study  Focal slowing: right posterior quadrant delta  Quasiperiodic GPDs with TPW morphology near 1hz at times with arousal  Moderate generalized background slowing    -----------------------------------------------------------------------------------------------------    Clinical Impression:  Right posterior quadrant focal cerebral dysfunction noted.  Correlate clinically/radiographically.  Moderate to severe diffuse/multi-focal cerebral dysfunction, not specific as to etiology.  GPDs with triphasic morphology are typically seen in the context of a relatively severe metabolic encephalopathic process, but may be associated with an increased risk for seizure, (more so typically if frequency >2hz).    No seizures.  ECG with ectopy.    Virgilio Coy MD  EEG/Epilepsy Attending

## 2024-03-01 NOTE — DISCHARGE NOTE FOR THE EXPIRED PATIENT - HOSPITAL COURSE
BRIEF HOSPITAL COURSE: 84 y/o F with a h/o HFrEF, CAD (s/p CABG), pHTN, s/p MVR, s/p TAVR, COPD (on intermittent 4L NC), CKD, HTN, DM2, admitted on 2/23 with complaints of worsening bilateral lower extremity edema that has become painful. She also reported an increased requirement in her home O2. She has been treated on the medicine service for acute on chronic CHF, lower extremity cellulitis, and MARGARITA on CKD. She suffered sudden cardiopulmonary arrest on 2/28 in the setting of progressive hypoxemia- ROSC achieved in approx 10 mins. Subsequent MSOF and profound shock state requiring triple IV vasopressor therapy. Dobutamine added given low LVEF. Patient remained dependent on ventilator and IV vasopressor support. Her family elected to initiate comfort care measures as she had not shown much improvement with medical therapy and would not have wanted this aggressive treatment to begin with. She was electively removed from the ventilator and was pronounced dead @ 2237 by SICU PA, Kyle Monsivais PA-C, as our MICU team was off the unit dealing with multiple emergencies. Case discussed with MICU physician, Dr. Cortés.      Total time spent on patient discharge: 35 mins

## 2024-03-01 NOTE — PROGRESS NOTE ADULT - ASSESSMENT
82 y/o F with a h/o HFrEF, CAD (s/p CABG), s/p MVR, s/p TAVR, pHTN, COPD (on intermittent 4L NC), CKD, HTN, DM2 admitted for CHF exacerbation and leg cellulitis. hospital course complicated by Acute hypoxemic/hypercapnic respiratory failure  and Cardiac arrest. Nephrology consulted for Rene.    Rene on CKD ? stage III  Acute decompensated heart failure  Cardiogenic shock  metabolic acidosis    Baseline SCr ~ 1.2 in 2022- no labs available after that  Scr 2.0 on presentation, improved with diuresis to 1.4 and started worsening after cardiac arrest  SCr 2.7 on most recent labs  Imaging reviewed; no hydronephrosis, mass or calculi    Pt on bumex gtt  84 y/o F with a h/o HFrEF, CAD (s/p CABG), s/p MVR, s/p TAVR, pHTN, COPD (on intermittent 4L NC), CKD, HTN, DM2 admitted for CHF exacerbation and leg cellulitis. hospital course complicated by Acute hypoxemic/hypercapnic respiratory failure  and Cardiac arrest. Nephrology consulted for Rene.    Rene on CKD ? stage III  Acute decompensated heart failure  Cardiogenic shock  metabolic acidosis    Baseline SCr ~ 1.2 in 2022- no labs available after that  Scr 2.0 on presentation, improved with diuresis to 1.4 and started worsening after cardiac arrest  Imaging reviewed; no hydronephrosis, mass or calculi    Rene in setting of cardiogenic shock; SCr 3.27 on most recent labs  Pt is on dobutamine + bumex gtt  UOP improving  Continue diuresis

## 2024-03-01 NOTE — GOALS OF CARE CONVERSATION - ADVANCED CARE PLANNING - CONVERSATION DETAILS
Mandi and Jesse requested to discuss transition to comfort care measures. They reiterated that Camille would not have wanted these aggressive measures to begin with, especially the invasive procedures. They were curious if we could extubate her now and see if she could verbalize her wishes, but we told them this would not be possible given her ventilator dependence and poor mental status. At the end of our conversation they elected to initiate comfort care measures.

## 2024-03-01 NOTE — DISCHARGE NOTE FOR THE EXPIRED PATIENT - OTHER SIGNIFICANT FINDINGS
pt  within 24 hours of restraint use. restraints were not present on patient at time of death. CMS log completed.

## 2024-03-01 NOTE — PROGRESS NOTE ADULT - ASSESSMENT
ASSESSMENT      PROBLEM LIST    Neuro:  Pulm:  CV:  GI:  Renal:  ID:  Heme:  Endo:      Case discussed with MICU Attending: Dr. Bill Mcmullen, NP Student  [facility] ASSESSMENT  83F with a PMHx of HFrEF, CAD S/P CABG, pHTN S/P MVR, S/P TAVR, COPD on intermittent 4L NC, HTN, and DM2. Pt. was admitted on 2/23 with complaints of worsening bilateral lower extremity edema that has become painful. She also reported an increased requirement in her home O2. She has been treated on the medicine service for acute on chronic CHF, lower extremity cellulitis, and MARGARITA. on 2/28 pt. was a code blue after developing hypoxemia and subsequent asystolic cardiac arrest. ROSC was achieved in about 10 minutes. Subsequent profound shock state after code requiring triple IV vasopressor therapy with a lactate 11.9. Pt. admitted to MICU for further management.     PROBLEM LIST  # Cardiac arrest  # Acute hypoxemic/hypercapnic respiratory failure  # Undifferentiated shock  # Acute systolic heart failure  # MARGARITA  # Lower extremity cellulitis    Neuro: Precedex for sedation while intubated. Assess pain and neuro status q4h.  Pulm: Currently tolerating ventilator settings, SBT as tolerated. Maintain airway. Keep SpO2 >92%. C/W ipratropium and levalbuterol treatments. Pulmonary toileting.  CV: Goal MAP 65-70, titrated levophed gtt as per orders and C/W vasopressin and dobutamine gtts. Keep K >4 and Mag >2, monitor with labs and replete as needed. Repeat echo S/P cardiac arrest pending. Bedside POCUS demonstrating enlarged RV, indicative of RV failure vs. biventricular failure. Decrease steroids to 50mg q8h. Heart failure and cardiology following, recommendations appreciated.  GI: No active issues. Start tube feeds today, nepro @ 10cc and increase 10cc q4h to goal of 40cc/hr. C/W Protonix.  Renal: Maintain Villeda for strict I&O's. Acidosis improving, bicarb gtt discontinued. Responding to bumex gtt this morning, decreased from 1mg/hr to 0.5mg/hr. Nephrology consulted and recommending CVVH, pt. family declining dialysis as per pt's GOC. C/W metolazone. Continue to monitor renal function and electrolytes with labs.  ID: Leukocytosis and febrile. Pt. started on empiric Meropenem, no growth in cultures to date. CT chest demonstrating RLL PNA. Continue to monitor WBC and fevers.  Heme: C/W ASA. Plan to discontinue heparin gtt. H/H stable at this time, continue to trend with CBC.  Endo: FS q6h while NPO, maintain euglycemia.    Case discussed with MICU Attending: Dr. Davis Mcmullen, NP Student  [facility] ASSESSMENT  83F with a PMHx of HFrEF, CAD S/P CABG, pHTN S/P MVR, S/P TAVR, COPD on intermittent 4L NC, HTN, and DM2. Pt. was admitted on 2/23 with complaints of worsening bilateral lower extremity edema that has become painful. She also reported an increased requirement in her home O2. She has been treated on the medicine service for acute on chronic CHF, lower extremity cellulitis, and MARGARITA. on 2/28 pt. was a code blue after developing hypoxemia and subsequent asystolic cardiac arrest. ROSC was achieved in about 10 minutes. Subsequent profound shock state after code requiring triple IV vasopressor therapy with a lactate 11.9. Pt. admitted to MICU for further management.     PROBLEM LIST  # Cardiac arrest  # Acute hypoxemic/hypercapnic respiratory failure  # Undifferentiated shock  # Acute systolic heart failure  # MARGARITA  # Lower extremity cellulitis    Neuro: Precedex for sedation while intubated. Assess pain and neuro status q4h.  Pulm: Currently tolerating ventilator settings, SBT as tolerated. Maintain airway. Keep SpO2 >92%. C/W ipratropium and levalbuterol treatments. Pulmonary toileting.  CV: Goal MAP 65-70, titrated levophed gtt as per orders and C/W vasopressin and dobutamine gtts. Keep K >4 and Mag >2, monitor with labs and replete as needed. Repeat echo S/P cardiac arrest pending. Bedside POCUS demonstrating enlarged RV, indicative of RV failure vs. biventricular failure. Decrease steroids to 50mg q8h. C/W heparin gtt for afib RVR prior to arrest. Heart failure and cardiology following, recommendations appreciated.  GI: No active issues. Start tube feeds today, nepro @ 10cc and increase 10cc q4h to goal of 40cc/hr. C/W Protonix.  Renal: Maintain Villeda for strict I&O's. Acidosis improving, bicarb gtt discontinued. Responding to bumex gtt this morning, decreased from 1mg/hr to 0.5mg/hr. Nephrology consulted and recommending CVVH, pt. family declining dialysis as per pt's GOC. C/W metolazone. Continue to monitor renal function and electrolytes with labs.  ID: Leukocytosis and febrile. Pt. started on empiric Meropenem, no growth in cultures to date. CT chest demonstrating RLL PNA. Continue to monitor WBC and fevers.  Heme: C/W ASA heparin gtt. H/H stable at this time, continue to trend with CBC.  Endo: FS q6h while NPO, maintain euglycemia.    Case discussed with MICU Attending: Dr. Davis Mcmullen, NP Student  [facility]

## 2024-03-01 NOTE — PROGRESS NOTE ADULT - SUBJECTIVE AND OBJECTIVE BOX
[facility] - [facility], THE [facility]ART [facility]                                   [address] [place] [address]                                                      PHONE: [phone]                                                         FAX: [phone]  [email or web address]  ---------------------------------------------------------------------------------------------------------------------------------    Overnight events/patient complaints:      Pt at the MICU intubated FIO2 40% PEEP 6 on pressors support IV ABx Bumetanide gtt and heparin gtt     No Known Allergies    MEDICATIONS  (STANDING):  acetaminophen   IVPB .. 1000 milliGRAM(s) IV Intermittent once  aspirin  chewable 81 milliGRAM(s) Oral daily  buMETAnide Infusion 0.5 mG/Hr (2.5 mL/Hr) IV Continuous <Continuous>  chlorhexidine 0.12% Liquid 15 milliLiter(s) Oral Mucosa every 12 hours  chlorhexidine 2% Cloths 1 Application(s) Topical daily  dexMEDEtomidine Infusion 0.2 MICROgram(s)/kG/Hr (3.71 mL/Hr) IV Continuous <Continuous>  dextrose 5%. 1000 milliLiter(s) (50 mL/Hr) IV Continuous <Continuous>  dextrose 5%. 1000 milliLiter(s) (100 mL/Hr) IV Continuous <Continuous>  dextrose 50% Injectable 25 Gram(s) IV Push once  dextrose 50% Injectable 12.5 Gram(s) IV Push once  dextrose 50% Injectable 25 Gram(s) IV Push once  DOBUTamine Infusion 2.5 MICROgram(s)/kG/Min (5.56 mL/Hr) IV Continuous <Continuous>  glucagon  Injectable 1 milliGRAM(s) IntraMuscular once  heparin  Infusion.  Unit(s)/Hr (13 mL/Hr) IV Continuous <Continuous>  hydrocortisone sodium succinate Injectable 100 milliGRAM(s) IV Push every 8 hours  insulin lispro (ADMELOG) corrective regimen sliding scale   SubCutaneous every 6 hours  ipratropium    for Nebulization 500 MICROGram(s) Nebulizer every 6 hours  levalbuterol Inhalation 0.63 milliGRAM(s) Inhalation every 6 hours  meropenem Injectable 1000 milliGRAM(s) IV Push every 8 hours  metolazone 10 milliGRAM(s) Oral <User Schedule>  norepinephrine Infusion 0.05 MICROgram(s)/kG/Min (3.47 mL/Hr) IV Continuous <Continuous>  pantoprazole  Injectable 40 milliGRAM(s) IV Push daily  vasopressin Infusion 0.04 Unit(s)/Min (6 mL/Hr) IV Continuous <Continuous>    MEDICATIONS  (PRN):  dextrose Oral Gel 15 Gram(s) Oral once PRN Blood Glucose LESS THAN 70 milliGRAM(s)/deciliter  fentaNYL    Injectable 25 MICROGram(s) IV Push every 4 hours PRN vent synchrony/agitation  heparin   Injectable 6000 Unit(s) IV Push every 6 hours PRN For aPTT less than 40  heparin   Injectable 3000 Unit(s) IV Push every 6 hours PRN For aPTT between 40 - 57        Vital Signs Last 24 Hrs  T(C): 38.3 (2024 10:00), Max: 38.6 (2024 08:00)  T(F): 100.9 (2024 10:00), Max: 101.5 (2024 08:00)  HR: 104 (2024 10:00) (70 - 145)  BP: 93/46 (2024 05:00) (86/60 - 135/62)  BP(mean): 61 (2024 05:00) (61 - 79)  RR: 21 (2024 10:00) (10 - 41)  SpO2: 99% (2024 10:00) (92% - 100%)    Parameters below as of 2024 09:18  Patient On (Oxygen Delivery Method): ventilator      ICU Vital Signs Last 24 Hrs  BEBETO DURÁN  I&O's Detail    2024 07:01  -  2024 07:00  --------------------------------------------------------  IN:    Bumetanide: 25 mL    Heparin Infusion: 71.5 mL    Norepinephrine: 481.7 mL    Propofol: 59.4 mL    Vasopressin: 48 mL  Total IN: 685.6 mL    OUT:    Indwelling Catheter - Urethral (mL): 15 mL    Voided (mL): 700 mL  Total OUT: 715 mL    Total NET: -29.4 mL      2024 07:  -  2024 11:07  --------------------------------------------------------  IN:    Bumetanide: 15 mL    Heparin Infusion: 22 mL    Norepinephrine: 138.9 mL    Propofol: 13.3 mL    Vasopressin: 18 mL  Total IN: 207.2 mL    OUT:    Indwelling Catheter - Urethral (mL): 35 mL  Total OUT: 35 mL    Total NET: 172.2 mL        I&O's Summary    2024 07:  -  2024 07:00  --------------------------------------------------------  IN: 685.6 mL / OUT: 715 mL / NET: -29.4 mL    2024 07:  -  2024 11:07  --------------------------------------------------------  IN: 207.2 mL / OUT: 35 mL / NET: 172.2 mL      Drug Dosing Vu  BEBETO DURÁN  Mode: AC/ CMV (Assist Control/ Continuous Mandatory Ventilation), RR (machine): 22, TV (machine): 500, FiO2: 80, PEEP: 6, ITime: 0.8, MAP: 12, PIP: 31    PHYSICAL EXAM:  General: Intubated sedated  Neck: Supple, no nodes adenopathy, no NVD or carotid bruit or thyromegaly.  CARDIOVASCULAR: Normal S1 and S2, 1/6 murmur, rub, gallop or lift.   LUNGS: Decrease BS at the lower bases   ABDOMEN: Soft, nontender without mass or organomegaly. bowel sounds normoactive.  EXTREMITIES: edema      LABS:                        14.7   31.45 )-----------( 131      ( 2024 05:40 )             46.6         142  |  99  |  52.8<H>  ----------------------------<  118<H>  5.3   |  23.0  |  2.37<H>    Ca    7.7<L>      2024 05:40  Phos  8.1       Mg     2.8         TPro  5.8<L>  /  Alb  3.1<L>  /  TBili  0.4  /  DBili  x   /  AST  140<H>  /  ALT  72<H>  /  AlkPhos  107      BEBETO DURÁN      PT/INR - ( 2024 05:40 )   PT: 16.9 sec;   INR: 1.54 ratio         PTT - ( 2024 05:40 )  PTT:190.5 sec  Urinalysis Basic - ( 2024 07:30 )    Color: Dark Yellow / Appearance: Turbid / S.018 / pH: x  Gluc: x / Ketone: Trace mg/dL  / Bili: Small / Urobili: 1.0 mg/dL   Blood: x / Protein: 300 mg/dL / Nitrite: Negative   Leuk Esterase: Trace / RBC: 199 /HPF / WBC 14 /HPF   Sq Epi: x / Non Sq Epi: 5 /HPF / Bacteria: Many /HPF        RADIOLOGY & ADDITIONAL STUDIES:    INTERPRETATION OF TELEMETRY (personally reviewed):    < from: 12 Lead ECG (24 @ 08:04) >  Diagnosis Line Sinus tachycardia with Fusion complexes  *** Suspect arm lead reversal, interpretation assumes no reversal  Incomplete right bundle branch block  Possible Right ventricular hypertrophy  Nonspecific ST and T wave abnormality Inferior leads  Abnormal ECG    Confirmed by BLAKE JENSEN ([id number]) on 2024 9:31:02 AM    < end of copied text >      < from: CT Chest No Cont (24 @ 08:39) >    FINDINGS:  CHEST:  LUNGS AND LARGE AIRWAYS: The tip of the endotracheal tube is above the   raiaz. The central airways are patent. Mild pulmonary edema. Patchy   right lower lobe consolidation may represent superimposed pneumonia.  PLEURA: Small right and trace left pleural effusion.  VESSELS: Aortic atherosclerosis without aneurysm.  HEART: Aortic valve replacement. Mild cardiomegaly. No pericardial   effusion. Coronary and mitral annular calcification.  MEDIASTINUM AND DANA: No adenopathy.  CHEST WALL AND LOWER NECK: No masses.    ABDOMEN AND PELVIS:  LIVER: Normal.  BILE DUCTS: Nondilated.  GALLBLADDER: Gallstones.  SPLEEN: Normal.  PANCREAS: Diffuse atrophy.  ADRENALS: Mild bilateral thickening.  KIDNEYS/URETERS: No hydronephrosis or urinary tract calculi.    BLADDER: Collapsed around a Villeda catheter balloon.  REPRODUCTIVE ORGANS: No masses.    BOWEL: The NG tube is in the stomach. No bowel obstruction. Rectal probe   in place.  PERITONEUM: Trace ascites. No free air or collection.  VESSELS: Aortoiliac atherosclerosis without aneurysm. Right femoral   arterial and venous lines.  RETROPERITONEUM/LYMPH NODES: No adenopathy or hematoma.  ABDOMINAL WALL: Mild bilateral flank edema.  BONES: Bilateral anterior rib fractures, likely from chest compressions.    IMPRESSION:  *  Pulmonary edema and small bilateral pleural effusions.  *  Superimposed right lower lobe pneumonia.  *  No acute abdominal pathology.    < end of copied text >        < from: TTE W or WO Ultrasound Enhancing Agent (24 @ 11:03) >     CONCLUSIONS:      1. The interventricular septum is flattened in systole and diastole consistent with right ventricular pressure and volume overload. Left ventricular systolic function is moderately to severely decreased with an ejection fraction of 31 % by Jamil's method of disks with an ejection fraction visually estimated at 30 to 35 %.   2. No LV thrombus with intravenous echocontrast.   3. There is mild (grade 1) left ventricular diastolic dysfunction.   4. Moderately enlarged right ventricular cavity size and severely reduced systolic function.   5. Estimated pulmonary artery systolic pressure is 86 mmHg, consistent with severe pulmonary hypertension.   6. The left atrium is mildly dilated.   7. The right atrium is moderately dilated.   8. Moderate tricuspid regurgitation.   9. Mitral valve annuloplasty with mild residual regurgitation.  10. TAVR bioprosthesis with normal function, mean gradient 5 mmHg, trace paravalvular regurgitation.  11. Mild pulmonic regurgitation.  12. The inferior vena cava is dilated measuring 2.4 cm in diameter,  13. No pericardial effusion seen.  14. No prior echocardiogram is available for comparison.    < end of copied text >      ASSESSMENT AND PLAN:  In summary, BEBETO DURÁN is an 83y Female with past medical history significant for COPD CAD s/p 5v CABG, AS s/p TAVR (), mitral valve annuloplasty, HFmrEF CKD who presents with left leg pain. Patient states that her leg has been weeping and that she has been having worsening swelling. She states that this has been happening for about a month. She used to not wear any oxygen at home however over the past month she has needed to. She notes that she was taking extra water pills to try and get the swelling down however they remained swollen. Patient came to the ER. Patient denies any chest pain, SOB, palpitations, near syncope, or syncope    Admitted for acute on chronic HFrEF     TTE 2024 EF 35% severe PHTN 86 mmHg TAVR mean gradient 5 mmHg     Events last 24 hours: Code Blue overhead as patient was reported to have developed hypoxemia and subsequent asystolic cardiac arrest. CPR/ACLS begun immediately with ROSC achieved in approx 10 mins. Subsequent profound shock state requiring IV vasopressor therapy. Lactate 11.9. Patient intubated on pressor support IV ABx Bumetanide gtt and heparin gtt    + trop demand and not C/O with ACS       CT of the ABD/PEL and chest showed     IMPRESSION:       Respiratory failure  *  Pulmonary edema and small bilateral pleural effusions.  *  Superimposed right lower lobe pneumonia.  *  No acute abdominal pathology.     No pericardial effusion noted         Plan    MICU care  Diuresis  Bumex gtt and monitor renal panel    Wean off presser support is possible   Abx as per MICU     Poor prognosis     Care as per ICU team

## 2024-03-01 NOTE — PROGRESS NOTE ADULT - REASON FOR ADMISSION
Legs full of water  Cellulitis  CHF

## 2024-03-01 NOTE — PROGRESS NOTE ADULT - ATTENDING COMMENTS
82 y/o F with hx of HFrEF, CAD s/p CABG, severe pulm HTN, s/p MVR and TAVR, COPD on 4L present initially with b/l lower extremity edema, CHF exacerbation, course complicated with asystolic cardiac arrest 2/28, ROSC achieved after 10 minutes    #cardiac arrest  #acute hypoxic respiratory failure  #pulmonary edema, r/o aspiration pneumonia  #undiferentiated shock, r/o mix cardiogenic vs. septic   #biV failure  #MARGARITA   #shock liver    given worsening kidney function with decrease urine output, GOC discussed with family along with renal, two daughters agree with DNR, and at this time they do not want to pursue dialysis.  Per Mandi, she reported that she would like to see how the clinical "numbers" and patient's clinical picture look in the next 24-48 hours to decide whether they want to pursue HD.  will continue bumex drip, appreciate heart failure rec  will start trial of dobutamine  patient does follow commands and has overall improved mental status.
84 y/o F with hx of HFrEF, CAD s/p CABG, severe pulm HTN, s/p MVR and TAVR, COPD on 4L present initially with b/l lower extremity edema, CHF exacerbation, course complicated with asystolic cardiac arrest 2/28, ROSC achieved after 10 minutes    #cardiac arrest  #acute hypoxic respiratory failure  #pulmonary edema, r/o aspiration pneumonia  #undiferentiated shock, r/o mix cardiogenic vs. septic   #biV failure  #MARGARITA   #shock liver    attempted SBT today, however patient did not tolerate due to tachypnea to 40s, and patient was in distress.  patient was then put back on sedation  Able to wean down levophed with adding dobutamine and to improve urine output.  however creatinine function continues to get worse.   overall patient still in shock state without significant clinical improvement.     extensive discussion with daughters today, explaining how patient overall prognosis remains poor.  Best case scenario would be trach and peg and likely wont be able to go back home.  Per daughters, this is something that patient will not want.   Initiate the idea of possible transitioning to comfort measure.  Family agree with continuing trial of critical care, and they will let us the medical team know whenever they decide on comfort measure.

## 2024-03-01 NOTE — PROGRESS NOTE ADULT - SUBJECTIVE AND OBJECTIVE BOX
[facility] DIVISION OF KIDNEY DISEASES AND HYPERTENSION -- FOLLOW UP NOTE  --------------------------------------------------------------------------------  Chief Complaint:    24 hour events/subjective:        PAST HISTORY  --------------------------------------------------------------------------------  No significant changes to PMH, PSH, FHx, SHx, unless otherwise noted    ALLERGIES & MEDICATIONS  --------------------------------------------------------------------------------  Allergies    No Known Allergies    Intolerances      Standing Inpatient Medications  aspirin  chewable 81 milliGRAM(s) Oral daily  buMETAnide Infusion 0.5 mG/Hr IV Continuous <Continuous>  chlorhexidine 0.12% Liquid 15 milliLiter(s) Oral Mucosa every 12 hours  chlorhexidine 2% Cloths 1 Application(s) Topical daily  dexMEDEtomidine Infusion 0.2 MICROgram(s)/kG/Hr IV Continuous <Continuous>  dextrose 5%. 1000 milliLiter(s) IV Continuous <Continuous>  dextrose 5%. 1000 milliLiter(s) IV Continuous <Continuous>  dextrose 50% Injectable 25 Gram(s) IV Push once  dextrose 50% Injectable 12.5 Gram(s) IV Push once  dextrose 50% Injectable 25 Gram(s) IV Push once  DOBUTamine Infusion 2.5 MICROgram(s)/kG/Min IV Continuous <Continuous>  glucagon  Injectable 1 milliGRAM(s) IntraMuscular once  heparin  Infusion.  Unit(s)/Hr IV Continuous <Continuous>  hydrocortisone sodium succinate Injectable 50 milliGRAM(s) IV Push every 8 hours  insulin lispro (ADMELOG) corrective regimen sliding scale   SubCutaneous every 6 hours  ipratropium    for Nebulization 500 MICROGram(s) Nebulizer every 6 hours  levalbuterol Inhalation 0.63 milliGRAM(s) Inhalation every 6 hours  meropenem Injectable 500 milliGRAM(s) IV Push every 12 hours  metolazone 10 milliGRAM(s) Oral <User Schedule>  norepinephrine Infusion 0.05 MICROgram(s)/kG/Min IV Continuous <Continuous>  pantoprazole  Injectable 40 milliGRAM(s) IV Push daily  vasopressin Infusion 0.04 Unit(s)/Min IV Continuous <Continuous>    PRN Inpatient Medications  dextrose Oral Gel 15 Gram(s) Oral once PRN  fentaNYL    Injectable 25 MICROGram(s) IV Push every 4 hours PRN  heparin   Injectable 3000 Unit(s) IV Push every 6 hours PRN  heparin   Injectable 6000 Unit(s) IV Push every 6 hours PRN      REVIEW OF SYSTEMS  --------------------------------------------------------------------------------  Gen: No weight changes, fatigue, fevers/chills, weakness  Skin: No rashes  Head/Eyes/Ears/Mouth: No headache; Normal hearing; Normal vision w/o blurriness; No sinus pain/discomfort, sore throat  Respiratory: No dyspnea, cough, wheezing, hemoptysis  CV: No chest pain, PND, orthopnea  GI: No abdominal pain, diarrhea, constipation, nausea, vomiting, melena, hematochezia  : No increased frequency, dysuria, hematuria, nocturia  MSK: No joint pain/swelling; no back pain; no edema  Neuro: No dizziness/lightheadedness, weakness, seizures, numbness, tingling  Heme: No easy bruising or bleeding  Endo: No heat/cold intolerance  Psych: No significant nervousness, anxiety, stress, depression    All other systems were reviewed and are negative, except as noted.    VITALS/PHYSICAL EXAM  --------------------------------------------------------------------------------  T(C): 38.1 (03-01-24 @ 12:00), Max: 38.1 (03-01-24 @ 12:00)  HR: 95 (03-01-24 @ 12:00) (82 - 101)  BP: --  RR: 32 (03-01-24 @ 12:00) (20 - 44)  SpO2: 96% (03-01-24 @ 12:00) (76% - 99%)  Wt(kg): --    Weight (kg): 72.4 (02-29-24 @ 01:00)      02-29-24 @ 07:01  -  03-01-24 @ 07:00  --------------------------------------------------------  IN: 1928.9 mL / OUT: 1800 mL / NET: 128.9 mL    03-01-24 @ 07:01  -  03-01-24 @ 12:41  --------------------------------------------------------  IN: 236.4 mL / OUT: 1175 mL / NET: -938.6 mL      Physical Exam:  	Gen: NAD, well-appearing  	HEENT: PERRL, supple neck, clear oropharynx  	Pulm: CTA B/L  	CV: RRR, S1S2; no rub  	Back: No spinal or CVA tenderness; no sacral edema  	Abd: +BS, soft, nontender/nondistended  	: No suprapubic tenderness  	UE: Warm, FROM, no clubbing, intact strength; no edema; no asterixis  	LE: Warm, FROM, no clubbing, intact strength; no edema  	Neuro: No focal deficits, intact gait  	Psych: Normal affect and mood  	Skin: Warm, without rashes  	Vascular access:    LABS/STUDIES  --------------------------------------------------------------------------------              12.7   24.18 >-----------<  74       [03-01-24 @ 05:58]              38.5     143  |  98  |  73.4  ----------------------------<  180      [03-01-24 @ 05:58]  4.8   |  23.0  |  3.27        Ca     7.2     [03-01-24 @ 05:58]      Mg     2.2     [03-01-24 @ 05:58]      Phos  6.3     [03-01-24 @ 05:58]    TPro  5.7  /  Alb  3.3  /  TBili  0.9  /  DBili  x   /  AST  958  /  ALT  490  /  AlkPhos  84  [03-01-24 @ 05:58]    PT/INR: PT 16.9 , INR 1.54       [02-29-24 @ 05:40]  PTT: 134.5      [03-01-24 @ 05:58]      Creatinine Trend:  SCr 3.27 [03-01 @ 05:58]  SCr 3.06 [02-29 @ 22:25]  SCr 2.97 [02-29 @ 18:10]  SCr 2.78 [02-29 @ 12:20]  SCr 2.37 [02-29 @ 05:40]    Urinalysis - [03-01-24 @ 05:58]      Color  / Appearance  / SG  / pH       Gluc 180 / Ketone   / Bili  / Urobili        Blood  / Protein  / Leuk Est  / Nitrite       RBC  / WBC  / Hyaline  / Gran  / Sq Epi  / Non Sq Epi  / Bacteria            [facility] DIVISION OF KIDNEY DISEASES AND HYPERTENSION -- FOLLOW UP NOTE  --------------------------------------------------------------------------------  Chief Complaint: Rene    24 hour events/subjective:  Pt seen in MICU  intubated-   on dobutamine gtt      PAST HISTORY  --------------------------------------------------------------------------------  No significant changes to PMH, PSH, FHx, SHx, unless otherwise noted    ALLERGIES & MEDICATIONS  --------------------------------------------------------------------------------  Allergies    No Known Allergies        Standing Inpatient Medications  aspirin  chewable 81 milliGRAM(s) Oral daily  buMETAnide Infusion 0.5 mG/Hr IV Continuous <Continuous>  chlorhexidine 0.12% Liquid 15 milliLiter(s) Oral Mucosa every 12 hours  chlorhexidine 2% Cloths 1 Application(s) Topical daily  dexMEDEtomidine Infusion 0.2 MICROgram(s)/kG/Hr IV Continuous <Continuous>  dextrose 5%. 1000 milliLiter(s) IV Continuous <Continuous>  dextrose 5%. 1000 milliLiter(s) IV Continuous <Continuous>  dextrose 50% Injectable 25 Gram(s) IV Push once  dextrose 50% Injectable 12.5 Gram(s) IV Push once  dextrose 50% Injectable 25 Gram(s) IV Push once  DOBUTamine Infusion 2.5 MICROgram(s)/kG/Min IV Continuous <Continuous>  glucagon  Injectable 1 milliGRAM(s) IntraMuscular once  heparin  Infusion.  Unit(s)/Hr IV Continuous <Continuous>  hydrocortisone sodium succinate Injectable 50 milliGRAM(s) IV Push every 8 hours  insulin lispro (ADMELOG) corrective regimen sliding scale   SubCutaneous every 6 hours  ipratropium    for Nebulization 500 MICROGram(s) Nebulizer every 6 hours  levalbuterol Inhalation 0.63 milliGRAM(s) Inhalation every 6 hours  meropenem Injectable 500 milliGRAM(s) IV Push every 12 hours  metolazone 10 milliGRAM(s) Oral <User Schedule>  norepinephrine Infusion 0.05 MICROgram(s)/kG/Min IV Continuous <Continuous>  pantoprazole  Injectable 40 milliGRAM(s) IV Push daily  vasopressin Infusion 0.04 Unit(s)/Min IV Continuous <Continuous>    PRN Inpatient Medications  dextrose Oral Gel 15 Gram(s) Oral once PRN  fentaNYL    Injectable 25 MICROGram(s) IV Push every 4 hours PRN  heparin   Injectable 3000 Unit(s) IV Push every 6 hours PRN  heparin   Injectable 6000 Unit(s) IV Push every 6 hours PRN      REVIEW OF SYSTEMS  --------------------------------------------------------------------------------  unable to obtain    VITALS/PHYSICAL EXAM  --------------------------------------------------------------------------------  T(C): 38.1 (03-01-24 @ 12:00), Max: 38.1 (03-01-24 @ 12:00)  HR: 95 (03-01-24 @ 12:00) (82 - 101)  BP: --  RR: 32 (03-01-24 @ 12:00) (20 - 44)  SpO2: 96% (03-01-24 @ 12:00) (76% - 99%)  Wt(kg): --    Weight (kg): 72.4 (02-29-24 @ 01:00)      02-29-24 @ 07:01  -  03-01-24 @ 07:00  --------------------------------------------------------  IN: 1928.9 mL / OUT: 1800 mL / NET: 128.9 mL    03-01-24 @ 07:01  -  03-01-24 @ 12:41  --------------------------------------------------------  IN: 236.4 mL / OUT: 1175 mL / NET: -938.6 mL      Physical Exam:  	    Gen: intubated/ sedated  	HEENT: ett to vent   	Pulm: mechanical bs  	CV: RRR, S1S2; no rub  	Abd: +BS, soft, nontender/nondistended  	: eligio  	UE: Warm, no edema  	LE: Warm, ; no edema  	Neuro: sedated  	Psych: unable to assess  	Skin: Warm  	Vascular access: none      LABS/STUDIES  --------------------------------------------------------------------------------              12.7   24.18 >-----------<  74       [03-01-24 @ 05:58]              38.5     143  |  98  |  73.4  ----------------------------<  180      [03-01-24 @ 05:58]  4.8   |  23.0  |  3.27        Ca     7.2     [03-01-24 @ 05:58]      Mg     2.2     [03-01-24 @ 05:58]      Phos  6.3     [03-01-24 @ 05:58]    TPro  5.7  /  Alb  3.3  /  TBili  0.9  /  DBili  x   /  AST  958  /  ALT  490  /  AlkPhos  84  [03-01-24 @ 05:58]    PT/INR: PT 16.9 , INR 1.54       [02-29-24 @ 05:40]  PTT: 134.5      [03-01-24 @ 05:58]      Creatinine Trend:  SCr 3.27 [03-01 @ 05:58]  SCr 3.06 [02-29 @ 22:25]  SCr 2.97 [02-29 @ 18:10]  SCr 2.78 [02-29 @ 12:20]  SCr 2.37 [02-29 @ 05:40]    Urinalysis - [03-01-24 @ 05:58]      Color  / Appearance  / SG  / pH       Gluc 180 / Ketone   / Bili  / Urobili        Blood  / Protein  / Leuk Est  / Nitrite       RBC  / WBC  / Hyaline  / Gran  / Sq Epi  / Non Sq Epi  / Bacteria

## 2024-03-01 NOTE — PROGRESS NOTE ADULT - CRITICAL CARE ATTENDING COMMENT
greater than 50% of time spent reviewing labs, notes, orders and radiographs, coordinating care  discussed with nursing, ICU fellow/resident, consultants  patient critically ill, actively titrating pressors and managing ventilator, high risk for decompensation.
greater than 50% of time spent reviewing labs, notes, orders and radiographs, coordinating care  discussed with nursing,ICu resident/student, consultants  patient is critically ill, actively managing ventilator, titrating pressor, high risk for deocmpensation

## 2024-03-01 NOTE — PROGRESS NOTE ADULT - ASSESSMENT
83F with HTN, DM, Chronic Obstructive Pulmonary Disease on oxygen intermittently, CAD/CABG, MCR, TAVR, admitted with leg swelling, being treated for decompensated heart failure, course complicated by code blue/cardiac arrest, now with Multiple pressor shock, ventilator dependence, concerns for anoxic injury, found to have some brain infarcts.     # shock  - cardiology following  - dual pressors at this time  - wean pressors as tolerated     # decompensaterd ehart failure  - on bumex drip  - management per heart failure/ cardio/ Intensive care unit    # ventilator dependence  - poor mental status precludes proper weaning    # s/p cardiac arrest, concerns for anoxia  - CT head as above, infarcts  - will see how she does off propofol     # Acute kidney injury  - supportive measures  - trend creatinine   - avoid nephrotoxins     # Encounter for palliative care  - Conversation held with daughter Anabela via telephone at length.   - Explained overall poor condition in consideration of advanced heart failure, kidney failure, s/p cardiac arrest, poor mental status, ventilator dependence.   - anabela agrees with do not resuscitate, but wants to continue trial of critical care to see if mom can get better. She expresses unlikely to want HD if she were to worsen or not improve.   - SHe states she is thankful the grand kids and rest of family got here from out of town.   - I addressed with Anabela that if we do not see improvement in the next 1-2 days, then we should have further discussion about how long we should continue aggressive measures if it may not improve moms recovery or quality of life.

## 2024-03-01 NOTE — PROGRESS NOTE ADULT - SUBJECTIVE AND OBJECTIVE BOX
Patient is a 83y old  Female who presents with a chief complaint of CHF exacerbation (01 Mar 2024 08:27)    BRIEF HOSPITAL COURSE: 83F with a PMHx of HFrEF, CAD S/P CABG, pHTN S/P MVR, S/P TAVR, COPD on intermittent 4L NC, HTN, and DM2. Pt. was admitted on 2/23 with complaints of worsening bilateral lower extremity edema that has become painful. She also reported an increased requirement in her home O2. She has been treated on the medicine service for acute on chronic CHF, lower extremity cellulitis, and MARGARITA. on 2/28 pt. was a code blue after developing hypoxemia and subsequent asystolic cardiac arrest. ROSC was achieved in about 10 minutes. Subsequent profound shock state after code requiring triple IV vasopressor therapy with a lactate 11.9. Pt. admitted to MICU for further management.     Events last 24 hours:    PAST MEDICAL & SURGICAL HISTORY:  CAD (coronary artery disease)      Congestive heart failure (CHF)      HTN (hypertension)      Borderline diabetes      COPD without exacerbation      S/P MVR (mitral valve repair)      S/P TAVR (transcatheter aortic valve replacement)      S/P CABG (coronary artery bypass graft)          Review of Systems:  CONSTITUTIONAL: No fever, chills, or fatigue  NEUROLOGICAL: No headaches, memory loss, loss of strength, numbness, or tremors  HEENT: No eye pain, visual disturbances, or discharge, No difficulty hearing, tinnitus, vertigo; No sinus or throat pain; No pain or stiffness  RESPIRATORY: No cough, wheezing, chills or hemoptysis; No shortness of breath  CARDIOVASCULAR: No chest pain, palpitations, dizziness, or leg swelling  GASTROINTESTINAL: No abdominal or epigastric pain. No nausea, vomiting, or hematemesis; No diarrhea or constipation. No melena or hematochezia.  GENITOURINARY: No dysuria, frequency, hematuria, or incontinence  SKIN: No itching, burning, rashes, or lesions   MUSCULOSKELETAL: No joint pain or swelling; No muscle, back, or extremity pain  PSYCHIATRIC: No depression, anxiety, mood swings, or difficulty sleeping    Medications:  meropenem Injectable 1000 milliGRAM(s) IV Push every 8 hours    buMETAnide Infusion 0.5 mG/Hr IV Continuous <Continuous>  DOBUTamine Infusion 2.5 MICROgram(s)/kG/Min IV Continuous <Continuous>  metolazone 10 milliGRAM(s) Oral <User Schedule>  norepinephrine Infusion 0.05 MICROgram(s)/kG/Min IV Continuous <Continuous>    ipratropium    for Nebulization 500 MICROGram(s) Nebulizer every 6 hours  levalbuterol Inhalation 0.63 milliGRAM(s) Inhalation every 6 hours    acetaminophen   IVPB .. 1000 milliGRAM(s) IV Intermittent once  dexMEDEtomidine Infusion 0.2 MICROgram(s)/kG/Hr IV Continuous <Continuous>  fentaNYL    Injectable 25 MICROGram(s) IV Push every 4 hours PRN      aspirin  chewable 81 milliGRAM(s) Oral daily  heparin   Injectable 6000 Unit(s) IV Push every 6 hours PRN  heparin   Injectable 3000 Unit(s) IV Push every 6 hours PRN  heparin  Infusion.  Unit(s)/Hr IV Continuous <Continuous>    pantoprazole  Injectable 40 milliGRAM(s) IV Push daily      dextrose 50% Injectable 12.5 Gram(s) IV Push once  dextrose 50% Injectable 25 Gram(s) IV Push once  dextrose 50% Injectable 25 Gram(s) IV Push once  dextrose Oral Gel 15 Gram(s) Oral once PRN  glucagon  Injectable 1 milliGRAM(s) IntraMuscular once  hydrocortisone sodium succinate Injectable 100 milliGRAM(s) IV Push every 8 hours  insulin lispro (ADMELOG) corrective regimen sliding scale   SubCutaneous every 6 hours  vasopressin Infusion 0.04 Unit(s)/Min IV Continuous <Continuous>    dextrose 5%. 1000 milliLiter(s) IV Continuous <Continuous>  dextrose 5%. 1000 milliLiter(s) IV Continuous <Continuous>      chlorhexidine 0.12% Liquid 15 milliLiter(s) Oral Mucosa every 12 hours  chlorhexidine 2% Cloths 1 Application(s) Topical daily        Mode: AC/ CMV (Assist Control/ Continuous Mandatory Ventilation)  RR (machine): 22  TV (machine): 450  FiO2: 40  PEEP: 6  ITime: 0.8  MAP: 11  PIP: 22      ICU Vital Signs Last 24 Hrs  T(C): 37.8 (01 Mar 2024 08:30), Max: 38.5 (29 Feb 2024 09:15)  T(F): 100 (01 Mar 2024 08:30), Max: 101.3 (29 Feb 2024 09:15)  HR: 95 (01 Mar 2024 08:52) (82 - 107)  BP: --  BP(mean): --  ABP: 151/61 (01 Mar 2024 08:30) (70/27 - 154/58)  ABP(mean): 94 (01 Mar 2024 08:30) (41 - 94)  RR: 29 (01 Mar 2024 08:30) (20 - 35)  SpO2: 98% (01 Mar 2024 08:52) (88% - 100%)    O2 Parameters below as of 01 Mar 2024 08:52  Patient On (Oxygen Delivery Method): ventilator            ABG - ( 01 Mar 2024 04:43 )  pH, Arterial: 7.380 pH, Blood: x     /  pCO2: 47    /  pO2: 126   / HCO3: 28    / Base Excess: 2.7   /  SaO2: 99.8                I&O's Detail    29 Feb 2024 07:01  -  01 Mar 2024 07:00  --------------------------------------------------------  IN:    Bumetanide: 120 mL    Dexmedetomidine: 117.6 mL    DOBUTamine: 74.6 mL    Heparin Infusion: 55 mL    Heparin Infusion: 104 mL    IV PiggyBack: 100 mL    Norepinephrine: 637.9 mL    Propofol: 13.3 mL    Sodium Bicarbonate: 562.5 mL    Vasopressin: 144 mL  Total IN: 1928.9 mL    OUT:    Indwelling Catheter - Urethral (mL): 1800 mL  Total OUT: 1800 mL    Total NET: 128.9 mL      01 Mar 2024 07:01  -  01 Mar 2024 09:01  --------------------------------------------------------  IN:    Bumetanide: 2.5 mL    Bumetanide: 5 mL    Dexmedetomidine: 24.4 mL    DOBUTamine: 10.8 mL    Heparin Infusion: 22 mL    Norepinephrine: 10.9 mL    Vasopressin: 12 mL  Total IN: 87.6 mL    OUT:  Total OUT: 0 mL    Total NET: 87.6 mL          LABS:                        12.7   24.18 )-----------( 74       ( 01 Mar 2024 05:58 )             38.5     03-01    143  |  98  |  73.4<H>  ----------------------------<  180<H>  4.8   |  23.0  |  3.27<H>    Ca    7.2<L>      01 Mar 2024 05:58  Phos  6.3     03-01  Mg     2.2     03-01    TPro  5.7<L>  /  Alb  3.3  /  TBili  0.9  /  DBili  x   /  AST  958<H>  /  ALT  490<H>  /  AlkPhos  84  03-01          CAPILLARY BLOOD GLUCOSE      POCT Blood Glucose.: 159 mg/dL (01 Mar 2024 05:34)    PT/INR - ( 29 Feb 2024 05:40 )   PT: 16.9 sec;   INR: 1.54 ratio         PTT - ( 01 Mar 2024 05:58 )  PTT:134.5 sec  Urinalysis Basic - ( 01 Mar 2024 05:58 )    Color: x / Appearance: x / SG: x / pH: x  Gluc: 180 mg/dL / Ketone: x  / Bili: x / Urobili: x   Blood: x / Protein: x / Nitrite: x   Leuk Esterase: x / RBC: x / WBC x   Sq Epi: x / Non Sq Epi: x / Bacteria: x      CULTURES:  Culture Results:   No growth at 24 hours (02-28 @ 22:25)  Culture Results:   No growth at 24 hours (02-28 @ 22:25)      Physical Examination  General: Well-groomed, well fed; In NAD  NEURO: A&Ox4, non-focal exam; full strength in all extremities  HEENT: +PERRLA, EOMI; nares patent bilaterally; mucous membranes pink, moist, and intact, uvula midline; neck supple with no JVD, no lymphadenopathy, trachea midline  PULM: Clear to auscultation bilaterally, no adventitious lung sounds noted; no significant sputum production  CV: S1S2, regular rate and rhythm; no murmurs, rubs, or gallops; +pulses throughout  ABD: Soft, nondistended, nontender, normoactive bowel sounds, no masses  EXT: No edema, clubbing or cyanosis; FROM in all extremities  SKIN: Warm and well perfused, no rashes, lesions, or wounds noted.      DEVICES:     RADIOLOGY:    Patient is a 83y old  Female who presents with a chief complaint of CHF exacerbation (01 Mar 2024 08:27)    BRIEF HOSPITAL COURSE: 83F with a PMHx of HFrEF, CAD S/P CABG, pHTN S/P MVR, S/P TAVR, COPD on intermittent 4L NC, HTN, and DM2. Pt. was admitted on 2/23 with complaints of worsening bilateral lower extremity edema that has become painful. She also reported an increased requirement in her home O2. She has been treated on the medicine service for acute on chronic CHF, lower extremity cellulitis, and MARGARITA. on 2/28 pt. was a code blue after developing hypoxemia and subsequent asystolic cardiac arrest. ROSC was achieved in about 10 minutes. Subsequent profound shock state after code requiring triple IV vasopressor therapy with a lactate 11.9. Pt. admitted to MICU for further management.     Events last 24 hours: Started on dobutamine and able to wean levophed gtt. Decreased bumex gtt due to increased urine output.    PAST MEDICAL & SURGICAL HISTORY:  CAD  Congestive heart failure  HTN  Borderline diabetes  COPD without exacerbation  Mitral valve repair  TAVR  CABG    Review of Systems:  CHELI, intubated    Medications:  meropenem Injectable 1000 milliGRAM(s) IV Push every 8 hours  buMETAnide Infusion 0.5 mG/Hr IV Continuous <Continuous>  DOBUTamine Infusion 2.5 MICROgram(s)/kG/Min IV Continuous <Continuous>  metolazone 10 milliGRAM(s) Oral <User Schedule>  norepinephrine Infusion 0.05 MICROgram(s)/kG/Min IV Continuous <Continuous>  ipratropium    for Nebulization 500 MICROGram(s) Nebulizer every 6 hours  levalbuterol Inhalation 0.63 milliGRAM(s) Inhalation every 6 hours  acetaminophen   IVPB .. 1000 milliGRAM(s) IV Intermittent once  dexMEDEtomidine Infusion 0.2 MICROgram(s)/kG/Hr IV Continuous <Continuous>  fentaNYL    Injectable 25 MICROGram(s) IV Push every 4 hours PRN  aspirin  chewable 81 milliGRAM(s) Oral daily  heparin   Injectable 6000 Unit(s) IV Push every 6 hours PRN  heparin   Injectable 3000 Unit(s) IV Push every 6 hours PRN  heparin  Infusion.  Unit(s)/Hr IV Continuous <Continuous>  pantoprazole  Injectable 40 milliGRAM(s) IV Push daily  dextrose 50% Injectable 12.5 Gram(s) IV Push once  dextrose 50% Injectable 25 Gram(s) IV Push once  dextrose 50% Injectable 25 Gram(s) IV Push once  dextrose Oral Gel 15 Gram(s) Oral once PRN  glucagon  Injectable 1 milliGRAM(s) IntraMuscular once  hydrocortisone sodium succinate Injectable 100 milliGRAM(s) IV Push every 8 hours  insulin lispro (ADMELOG) corrective regimen sliding scale   SubCutaneous every 6 hours  vasopressin Infusion 0.04 Unit(s)/Min IV Continuous <Continuous>  dextrose 5%. 1000 milliLiter(s) IV Continuous <Continuous>  dextrose 5%. 1000 milliLiter(s) IV Continuous <Continuous>  chlorhexidine 0.12% Liquid 15 milliLiter(s) Oral Mucosa every 12 hours  chlorhexidine 2% Cloths 1 Application(s) Topical daily    Ventilator  Mode: AC/ CMV (Assist Control/ Continuous Mandatory Ventilation)  RR (machine): 22  TV (machine): 450  FiO2: 40  PEEP: 6  ITime: 0.8  MAP: 11  PIP: 22    ICU Vital Signs Last 24 Hrs  T(C): 37.8 (01 Mar 2024 08:30), Max: 38.5 (29 Feb 2024 09:15)  T(F): 100 (01 Mar 2024 08:30), Max: 101.3 (29 Feb 2024 09:15)  HR: 95 (01 Mar 2024 08:52) (82 - 107)  BP: --  BP(mean): --  ABP: 151/61 (01 Mar 2024 08:30) (70/27 - 154/58)  ABP(mean): 94 (01 Mar 2024 08:30) (41 - 94)  RR: 29 (01 Mar 2024 08:30) (20 - 35)  SpO2: 98% (01 Mar 2024 08:52) (88% - 100%)  O2 Parameters below as of 01 Mar 2024 08:52  Patient On (Oxygen Delivery Method): ventilator    ABG - ( 01 Mar 2024 04:43 )  pH, Arterial: 7.380 pH, Blood: x     /  pCO2: 47    /  pO2: 126   / HCO3: 28    / Base Excess: 2.7   /  SaO2: 99.8      I&O's Detail  29 Feb 2024 07:01  -  01 Mar 2024 07:00  --------------------------------------------------------  IN:    Bumetanide: 120 mL    Dexmedetomidine: 117.6 mL    DOBUTamine: 74.6 mL    Heparin Infusion: 55 mL    Heparin Infusion: 104 mL    IV PiggyBack: 100 mL    Norepinephrine: 637.9 mL    Propofol: 13.3 mL    Sodium Bicarbonate: 562.5 mL    Vasopressin: 144 mL  Total IN: 1928.9 mL  OUT:    Indwelling Catheter - Urethral (mL): 1800 mL  Total OUT: 1800 mL  Total NET: 128.9 mL    01 Mar 2024 07:01  -  01 Mar 2024 09:01  --------------------------------------------------------  IN:    Bumetanide: 2.5 mL    Bumetanide: 5 mL    Dexmedetomidine: 24.4 mL    DOBUTamine: 10.8 mL    Heparin Infusion: 22 mL    Norepinephrine: 10.9 mL    Vasopressin: 12 mL  Total IN: 87.6 mL  OUT:  Total OUT: 0 mL  Total NET: 87.6 mL    LABS:             12.7   24.18 )-----------( 74       ( 01 Mar 2024 05:58 )             38.5     03-01  143  |  98  |  73.4<H>  ----------------------------<  180<H>  4.8   |  23.0  |  3.27<H>    Ca    7.2<L>      01 Mar 2024 05:58  Phos  6.3     03-01  Mg     2.2     03-01  TPro  5.7<L>  /  Alb  3.3  /  TBili  0.9  /  DBili  x   /  AST  958<H>  /  ALT  490<H>  /  AlkPhos  84  03-01    CAPILLARY BLOOD GLUCOSE  POCT Blood Glucose.: 159 mg/dL (01 Mar 2024 05:34)    PT/INR - ( 29 Feb 2024 05:40 )   PT: 16.9 sec;   INR: 1.54 ratio    PTT - ( 01 Mar 2024 05:58 )  PTT:134.5 sec    Urinalysis Basic - ( 01 Mar 2024 05:58 )  Color: x / Appearance: x / SG: x / pH: x  Gluc: 180 mg/dL / Ketone: x  / Bili: x / Urobili: x   Blood: x / Protein: x / Nitrite: x   Leuk Esterase: x / RBC: x / WBC x   Sq Epi: x / Non Sq Epi: x / Bacteria: x    CULTURES:  Culture Results:   No growth at 24 hours (02-28 @ 22:25)  Culture Results:   No growth at 24 hours (02-28 @ 22:25)    Physical Examination  General: Frail, elderly female; In NAD  NEURO: Sedated on precedex, opens eyes to voice and follows simple commands; withdraws from pain  HEENT: +PERRLA; nares patent bilaterally; mucous membranes pink, moist, and intact; neck supple with no JVD, no lymphadenopathy, trachea midline  PULM: 8 ETT, 22 @ lip; Clear to auscultation bilaterally, no adventitious lung sounds noted; no significant sputum production  CV: S1S2, regular rate and rhythm; no murmurs, rubs, or gallops; +pulses throughout  ABD: Soft, nondistended, nontender, normoactive bowel sounds, no masses  EXT: +bilateral lower extremity south; no clubbing or cyanosis  SKIN: +cellulitis of bilateral lower extremity; cool to touch; no rashes, lesions, or wounds noted    DEVICES:  EEG  ETT  Villeda  TLC CVC  Arterial line    RADIOLOGY:  from: 12 Lead ECG (02.29.24 @ 08:04)  Ventricular Rate 106 BPM  Atrial Rate 106 BPM  P-R Interval 168 ms  QRS Duration 94 ms  Q-T Interval 334 ms  QTC Calculation(Bazett) 443 ms  P Axis 79 degrees  R Axis 133 degrees  T Axis 157 degrees  Diagnosis Line Sinus tachycardia with Fusion complexes  *** Suspect arm lead reversal, interpretation assumes no reversal  Incomplete right bundle branch block  Possible Right ventricular hypertrophy  Nonspecific ST and T wave abnormality Inferior leads  Abnormal ECG  Confirmed by BLAKE JENSEN ([id number]) on 2/29/2024 9:31:02 AM  < end of copied text >    from: CT Head No Cont (02.29.24 @ 08:40)  ACC: [id number] EXAM:  CT BRAIN   ORDERED BY: DORETHA ARROYO   PROCEDURE DATE:  02/29/2024    INTERPRETATION:  CT HEAD  CLINICAL HISTORY: post cardiac arrest  TECHNIQUE:  Noncontrast CT.  Axial Acquisition.  Sagittal and coronal reformations.  COMPARISON:  No prior studies for comparison  FINDINGS:  Exam is limited by artifact from overlying EEG leads.  HEMORRHAGE:  No evidence of intracranial hemorrhage.  BRAIN PARENCHYMA:  Mild atrophy. Moderate patchy white matter ischemic  changes within the cerebral hemispheres. With a chronic appearing lacunar   infarct involving the genu of the left internal capsule. Patchy ischemic   changes and chronic appearing small infarcts involving the bilateral   cerebellar hemispheres..  No mass or mass effect.  VENTRICLES / SHIFT:  No hydrocephalus. No midline shift.  EXTRA-AXIAL / BASAL CISTERNS:  No extra-axial mass. Basal cisterns   preserved.  CALVARIUM AND EXTRACRANIAL SOFT TISSUES:  No depressed calvarial fracture.  SINUSES, ORBITS, MASTOIDS:  The visualized paranasal sinuses and mastoid   air cells are well aerated.  There is marked dilatation and tortuosity of the superior ophthalmic   veins. Further imaging/evaluation when patient's condition permits.  IMPRESSION:  Limited by artifact from overlying EEG leads.  No evidence of an acute intracranial hemorrhage, midline shift, or   hydrocephalus.  Patchy ischemic changes and chronic appearing small infarcts as described.  Interval follow-up if anoxic brain injury remains of clinical concern.  Marked dilatation and tortuosity of the superior ophthalmic veins   bilaterally. Further imaging/evaluation when patient's condition permits.  --- End of Report ---  LISA VILLATORO MD; Attending Radiologist  Thisdocument has been electronically signed. Feb 29 2024 11:33AM  < end of copied text >    from: CT Abdomen and Pelvis No Cont (02.29.24 @ 08:40)  ACC: [id number] EXAM:  CT ABDOMEN AND PELVIS   ORDERED BY: DORETHA ARROYO   ACC: [id number] EXAM:  CT CHEST   ORDERED BY: DORETHA ARROYO   PROCEDURE DATE:  02/29/2024    INTERPRETATION:  CLINICAL INFORMATION: Heart failure, chronic kidney   disease, cardiac arrest, abnormal chest x-ray  COMPARISON: Same day chest x-ray, chest CT 7/3/2022  CONTRAST/COMPLICATIONS:  IV Contrast: NONE  Oral Contrast: NONE  Complications: None reported at time of study completion  PROCEDURE:  CT of the Chest, Abdomen and Pelvis was performed.  Sagittal and coronal reformats were performed.  FINDINGS:  CHEST:  LUNGS AND LARGE AIRWAYS: The tip of the endotracheal tube is above the   raiza. The central airways are patent. Mild pulmonary edema. Patchy   right lower lobe consolidation may represent superimposed pneumonia.  PLEURA: Small right and trace left pleural effusion.  VESSELS: Aortic atherosclerosis without aneurysm.  HEART: Aortic valve replacement. Mild cardiomegaly. No pericardial   effusion. Coronary and mitral annular calcification.  MEDIASTINUM AND DANA: No adenopathy.  CHEST WALL AND LOWER NECK: No masses.  ABDOMEN AND PELVIS:  LIVER: Normal.  BILE DUCTS: Nondilated.  GALLBLADDER: Gallstones.  SPLEEN: Normal.  PANCREAS: Diffuse atrophy.  ADRENALS: Mild bilateral thickening.  KIDNEYS/URETERS: No hydronephrosis or urinary tract calculi.  BLADDER: Collapsed around a Villeda catheter balloon.  REPRODUCTIVE ORGANS: No masses.  BOWEL: The NG tube is in the stomach. No bowel obstruction. Rectal probe   in place.  PERITONEUM: Trace ascites. No free air or collection.  VESSELS: Aortoiliac atherosclerosis without aneurysm. Right femoral   arterial and venous lines.  RETROPERITONEUM/LYMPH NODES: No adenopathy or hematoma.  ABDOMINAL WALL: Mild bilateral flank edema.  BONES: Bilateral anterior rib fractures, likely from chest compressions.  IMPRESSION:  *  Pulmonary edema and small bilateral pleural effusions.  *  Superimposed right lower lobe pneumonia.  *  No acute abdominal pathology.  --- End of Report ---  BRYANNA HOWELL MD; Attending Radiologist  This document has been electronically signed. Feb 29 2024  9:05AM  < end of copied text >

## 2024-03-01 NOTE — PROGRESS NOTE ADULT - SUBJECTIVE AND OBJECTIVE BOX
OVERNIGHT EVENTS: remains with poor mental status, fluctuations of BP this AM, hypertensive then hypotensive.     Present Symptoms:     Dyspnea: Mild Moderate Severe  Nausea/Vomiting: Yes No  Anxiety:  Yes No  Depression: Yes No  Fatigue: Yes No  Loss of appetite: Yes No  Constipation:     Pain:             Character-            Duration-            Effect-            Factors-            Frequency-            Location-            Severity-    Pain AD Score:  [email or web address] (press ctrl + left click to view)    Review of Systems: Reviewed                     Negative:                     Positive:  Unable to obtain due to poor mentation   All others negative    MEDICATIONS  (STANDING):  acetaminophen   IVPB .. 1000 milliGRAM(s) IV Intermittent once  aspirin  chewable 81 milliGRAM(s) Oral daily  buMETAnide Infusion 0.5 mG/Hr (2.5 mL/Hr) IV Continuous <Continuous>  chlorhexidine 0.12% Liquid 15 milliLiter(s) Oral Mucosa every 12 hours  chlorhexidine 2% Cloths 1 Application(s) Topical daily  dexMEDEtomidine Infusion 0.2 MICROgram(s)/kG/Hr (3.71 mL/Hr) IV Continuous <Continuous>  dextrose 5%. 1000 milliLiter(s) (50 mL/Hr) IV Continuous <Continuous>  dextrose 5%. 1000 milliLiter(s) (100 mL/Hr) IV Continuous <Continuous>  dextrose 50% Injectable 25 Gram(s) IV Push once  dextrose 50% Injectable 12.5 Gram(s) IV Push once  dextrose 50% Injectable 25 Gram(s) IV Push once  DOBUTamine Infusion 2.5 MICROgram(s)/kG/Min (5.56 mL/Hr) IV Continuous <Continuous>  glucagon  Injectable 1 milliGRAM(s) IntraMuscular once  heparin  Infusion.  Unit(s)/Hr (13 mL/Hr) IV Continuous <Continuous>  hydrocortisone sodium succinate Injectable 100 milliGRAM(s) IV Push every 8 hours  insulin lispro (ADMELOG) corrective regimen sliding scale   SubCutaneous every 6 hours  ipratropium    for Nebulization 500 MICROGram(s) Nebulizer every 6 hours  levalbuterol Inhalation 0.63 milliGRAM(s) Inhalation every 6 hours  meropenem Injectable 1000 milliGRAM(s) IV Push every 8 hours  metolazone 10 milliGRAM(s) Oral <User Schedule>  norepinephrine Infusion 0.05 MICROgram(s)/kG/Min (3.47 mL/Hr) IV Continuous <Continuous>  pantoprazole  Injectable 40 milliGRAM(s) IV Push daily  vasopressin Infusion 0.04 Unit(s)/Min (6 mL/Hr) IV Continuous <Continuous>    MEDICATIONS  (PRN):  dextrose Oral Gel 15 Gram(s) Oral once PRN Blood Glucose LESS THAN 70 milliGRAM(s)/deciliter  fentaNYL    Injectable 25 MICROGram(s) IV Push every 4 hours PRN vent synchrony/agitation  heparin   Injectable 6000 Unit(s) IV Push every 6 hours PRN For aPTT less than 40  heparin   Injectable 3000 Unit(s) IV Push every 6 hours PRN For aPTT between 40 - 57      PHYSICAL EXAM:    Vital Signs Last 24 Hrs  T(C): 37.7 (01 Mar 2024 08:02), Max: 38.5 (29 Feb 2024 09:00)  T(F): 99.9 (01 Mar 2024 08:02), Max: 101.3 (29 Feb 2024 09:00)  HR: 97 (01 Mar 2024 07:45) (82 - 107)  BP: --  BP(mean): --  RR: 31 (01 Mar 2024 07:45) (20 - 35)  SpO2: 95% (01 Mar 2024 07:45) (88% - 100%)    Parameters below as of 01 Mar 2024 04:11  Patient On (Oxygen Delivery Method): ventilator        General: alert  oriented x ____ lethargic agitated                  cachexia  nonverbal  coma    Karnofsky:  %    HEENT: normal  dry mouth  ET tube/trach    Lungs: comfortable tachypnea/labored breathing  excessive secretions    CV: normal  tachycardia    GI: normal  distended  tender  no BS               PEG/NG/OG tube  constipation  last BM:     : normal  incontinent  oliguria/anuria  del valle    MSK: normal  weakness  edema             ambulatory  bedbound/wheelchair bound    Skin: normal  pressure ulcers- Stage_____  no rash    LABS:                          12.7   24.18 )-----------( 74       ( 01 Mar 2024 05:58 )             38.5     03-01    143  |  98  |  73.4<H>  ----------------------------<  180<H>  4.8   |  23.0  |  3.27<H>    Ca    7.2<L>      01 Mar 2024 05:58  Phos  6.3     03-01  Mg     2.2     03-01    TPro  5.7<L>  /  Alb  3.3  /  TBili  0.9  /  DBili  x   /  AST  958<H>  /  ALT  490<H>  /  AlkPhos  84  03-01    PT/INR - ( 29 Feb 2024 05:40 )   PT: 16.9 sec;   INR: 1.54 ratio       PTT - ( 01 Mar 2024 05:58 )  PTT:134.5 sec  Urinalysis Basic - ( 01 Mar 2024 05:58 )    Color: x / Appearance: x / SG: x / pH: x  Gluc: 180 mg/dL / Ketone: x  / Bili: x / Urobili: x   Blood: x / Protein: x / Nitrite: x   Leuk Esterase: x / RBC: x / WBC x   Sq Epi: x / Non Sq Epi: x / Bacteria: x      I&O's Summary    29 Feb 2024 07:01  -  01 Mar 2024 07:00  --------------------------------------------------------  IN: 1910.7 mL / OUT: 1800 mL / NET: 110.7 mL        RADIOLOGY & ADDITIONAL STUDIES:    ADVANCE DIRECTIVES/TREATMENT PREFERENCES:  DNR YES NO  Completed on:                     MOLST  YES NO   Completed on:  Living Will  YES NO   Completed on: OVERNIGHT EVENTS: remains with poor mental status, fluctuations of BP this AM, hypertensive then hypotensive.     Present Symptoms: per nursing     Dyspnea: on vent   Nausea/Vomiting: No  Anxiety:  No  Depression: unable   Fatigue: unable   Loss of appetite: unable   Constipation: none     Pain: none currently             Character-            Duration-            Effect-            Factors-            Frequency-            Location-            Severity-    Pain AD Score:  [email or web address] (press ctrl + left click to view)    Review of Systems: Reviewed              Unable to obtain due to poor mentation   All others negative    MEDICATIONS  (STANDING):  acetaminophen   IVPB .. 1000 milliGRAM(s) IV Intermittent once  aspirin  chewable 81 milliGRAM(s) Oral daily  buMETAnide Infusion 0.5 mG/Hr (2.5 mL/Hr) IV Continuous <Continuous>  chlorhexidine 0.12% Liquid 15 milliLiter(s) Oral Mucosa every 12 hours  chlorhexidine 2% Cloths 1 Application(s) Topical daily  dexMEDEtomidine Infusion 0.2 MICROgram(s)/kG/Hr (3.71 mL/Hr) IV Continuous <Continuous>  dextrose 5%. 1000 milliLiter(s) (50 mL/Hr) IV Continuous <Continuous>  dextrose 5%. 1000 milliLiter(s) (100 mL/Hr) IV Continuous <Continuous>  dextrose 50% Injectable 25 Gram(s) IV Push once  dextrose 50% Injectable 12.5 Gram(s) IV Push once  dextrose 50% Injectable 25 Gram(s) IV Push once  DOBUTamine Infusion 2.5 MICROgram(s)/kG/Min (5.56 mL/Hr) IV Continuous <Continuous>  glucagon  Injectable 1 milliGRAM(s) IntraMuscular once  heparin  Infusion.  Unit(s)/Hr (13 mL/Hr) IV Continuous <Continuous>  hydrocortisone sodium succinate Injectable 100 milliGRAM(s) IV Push every 8 hours  insulin lispro (ADMELOG) corrective regimen sliding scale   SubCutaneous every 6 hours  ipratropium    for Nebulization 500 MICROGram(s) Nebulizer every 6 hours  levalbuterol Inhalation 0.63 milliGRAM(s) Inhalation every 6 hours  meropenem Injectable 1000 milliGRAM(s) IV Push every 8 hours  metolazone 10 milliGRAM(s) Oral <User Schedule>  norepinephrine Infusion 0.05 MICROgram(s)/kG/Min (3.47 mL/Hr) IV Continuous <Continuous>  pantoprazole  Injectable 40 milliGRAM(s) IV Push daily  vasopressin Infusion 0.04 Unit(s)/Min (6 mL/Hr) IV Continuous <Continuous>    MEDICATIONS  (PRN):  dextrose Oral Gel 15 Gram(s) Oral once PRN Blood Glucose LESS THAN 70 milliGRAM(s)/deciliter  fentaNYL    Injectable 25 MICROGram(s) IV Push every 4 hours PRN vent synchrony/agitation  heparin   Injectable 6000 Unit(s) IV Push every 6 hours PRN For aPTT less than 40  heparin   Injectable 3000 Unit(s) IV Push every 6 hours PRN For aPTT between 40 - 57      PHYSICAL EXAM:    Vital Signs Last 24 Hrs  T(C): 37.7 (01 Mar 2024 08:02), Max: 38.5 (29 Feb 2024 09:00)  T(F): 99.9 (01 Mar 2024 08:02), Max: 101.3 (29 Feb 2024 09:00)  HR: 97 (01 Mar 2024 07:45) (82 - 107)  BP: --  BP(mean): --  RR: 31 (01 Mar 2024 07:45) (20 - 35)  SpO2: 95% (01 Mar 2024 07:45) (88% - 100%)    Parameters below as of 01 Mar 2024 04:11  Patient On (Oxygen Delivery Method): ventilator        General: alert  oriented x ____ lethargic agitated                  cachexia  nonverbal  coma    Karnofsky:  %    HEENT: normal  dry mouth  ET tube/trach    Lungs: comfortable tachypnea/labored breathing  excessive secretions    CV: normal  tachycardia    GI: normal  distended  tender  no BS               PEG/NG/OG tube  constipation  last BM:     : normal  incontinent  oliguria/anuria  del valle    MSK: normal  weakness  edema             ambulatory  bedbound/wheelchair bound    Skin: normal  pressure ulcers- Stage_____  no rash    LABS:                          12.7   24.18 )-----------( 74       ( 01 Mar 2024 05:58 )             38.5     03-01    143  |  98  |  73.4<H>  ----------------------------<  180<H>  4.8   |  23.0  |  3.27<H>    Ca    7.2<L>      01 Mar 2024 05:58  Phos  6.3     03-01  Mg     2.2     03-01    TPro  5.7<L>  /  Alb  3.3  /  TBili  0.9  /  DBili  x   /  AST  958<H>  /  ALT  490<H>  /  AlkPhos  84  03-01    PT/INR - ( 29 Feb 2024 05:40 )   PT: 16.9 sec;   INR: 1.54 ratio       PTT - ( 01 Mar 2024 05:58 )  PTT:134.5 sec  Urinalysis Basic - ( 01 Mar 2024 05:58 )    Color: x / Appearance: x / SG: x / pH: x  Gluc: 180 mg/dL / Ketone: x  / Bili: x / Urobili: x   Blood: x / Protein: x / Nitrite: x   Leuk Esterase: x / RBC: x / WBC x   Sq Epi: x / Non Sq Epi: x / Bacteria: x      I&O's Summary    29 Feb 2024 07:01  -  01 Mar 2024 07:00  --------------------------------------------------------  IN: 1910.7 mL / OUT: 1800 mL / NET: 110.7 mL        RADIOLOGY & ADDITIONAL STUDIES:    ADVANCE DIRECTIVES/TREATMENT PREFERENCES:  DNR YES NO  Completed on:                     MOLST  YES NO   Completed on:  Living Will  YES NO   Completed on:

## 2024-03-01 NOTE — DISCHARGE NOTE FOR THE EXPIRED PATIENT - NS PRELIMINARY CAUSE OF DEATH_RESTRICTED
no change in mental status, patients respirations increased to 20 and oxygen saturation improved with nonrebrether mask which was not previously required for patient Heart Failure

## 2024-03-02 LAB
CULTURE RESULTS: NO GROWTH — SIGNIFICANT CHANGE UP
SPECIMEN SOURCE: SIGNIFICANT CHANGE UP

## 2024-03-05 LAB
CULTURE RESULTS: SIGNIFICANT CHANGE UP
CULTURE RESULTS: SIGNIFICANT CHANGE UP
SPECIMEN SOURCE: SIGNIFICANT CHANGE UP
SPECIMEN SOURCE: SIGNIFICANT CHANGE UP

## 2024-03-06 NOTE — CHART NOTE - NSCHARTNOTEFT_GEN_A_CORE
83 year old female with Hypertension, Diabetes, COPD on intermittent 4LNC, CHF?rEF, CAD s/p CABG, MVR and TAVR presents with swelling of legs for past 2 months, gotten worse over the past 4 days now painful affecting ambulation Supplemental O2 for the past 2 months intermittently but has been using it consistently recently.  Admitted for acute heart failure exacerbation.    Patient with episodes of oxygen desaturation into the 80's   Patient given scheduled Lasix 40mg IVP    Patient seen and evaluated at bedside   Patient appears winded when talking, able to speaking in full sentences   ROS: No chest pain, palpitations, SOB, light headedness, dizziness    Vital Signs   T(F): 97.5   HR: 73   BP: 114/70   RR: 20   SpO2: 92%     GENERAL: NA  NERVOUS SYSTEM: Alert and awake, Good concentration  CHEST/LUNG: Diminished breath sounds b/l; mildly labored respirations  HEART: S1S2+, Regular rate and rhythm  ABDOMEN: Soft, Nontender, Nondistended; BS+   EXTREMITIES:  2+ Peripheral Pulses,  B/L LE swelling and erythema   SKIN: Warm and dry    Supplemental O2, target SpO2 88-94  Given PRN Duoneb   STAT CXR  Duplex of LE ordered     Addendum  Patient reassessed after Duoneb, appears more comfortable    On 4L with O2 92%   RN to notify with any acute changes
Nutrition Note: Pt was visited by RD for CHF education. Heart Failure written education provided to the pt. Pt daughter reports mother receiving many low sodium diet educations in the past that she has received with her previous heart surgeries. RD will remain available for verbal low sodium diet education as needed.
Palliative care social work note.    Bereavement call made to patients daughter Mandi. Support and resources offered.
Pt s/p code last night  del valle inserted by  team  pt with scant amount of yellow urine in bag  cont care as per ICU team
Code Blue called  MICU at bedside  Dr Quintero at bedside  Daughter Mandi called and notified  Mandi is on her way  to the hospital
